# Patient Record
Sex: MALE | Race: WHITE | NOT HISPANIC OR LATINO | URBAN - METROPOLITAN AREA
[De-identification: names, ages, dates, MRNs, and addresses within clinical notes are randomized per-mention and may not be internally consistent; named-entity substitution may affect disease eponyms.]

---

## 2015-10-22 RX ORDER — CHOLECALCIFEROL (VITAMIN D3) 125 MCG
1 CAPSULE ORAL
Qty: 0 | Refills: 0 | COMMUNITY
Start: 2015-10-22

## 2015-10-22 RX ORDER — IPRATROPIUM/ALBUTEROL SULFATE 18-103MCG
3 AEROSOL WITH ADAPTER (GRAM) INHALATION
Qty: 0 | Refills: 0 | COMMUNITY
Start: 2015-10-22

## 2015-10-22 RX ORDER — ASCORBIC ACID 60 MG
1 TABLET,CHEWABLE ORAL
Qty: 0 | Refills: 0 | COMMUNITY
Start: 2015-10-22

## 2015-10-22 RX ORDER — ASCORBIC ACID 60 MG
0.5 TABLET,CHEWABLE ORAL
Qty: 0 | Refills: 0 | COMMUNITY
Start: 2015-10-22

## 2015-10-22 RX ORDER — LEVOTHYROXINE SODIUM 125 MCG
1 TABLET ORAL
Qty: 0 | Refills: 0 | COMMUNITY
Start: 2015-10-22

## 2015-10-22 RX ORDER — PREGABALIN 225 MG/1
1200 CAPSULE ORAL
Qty: 0 | Refills: 0 | COMMUNITY
Start: 2015-10-22

## 2015-10-22 RX ORDER — CHOLECALCIFEROL (VITAMIN D3) 125 MCG
2 CAPSULE ORAL
Qty: 0 | Refills: 0 | COMMUNITY
Start: 2015-10-22

## 2015-10-22 RX ORDER — CHOLECALCIFEROL (VITAMIN D3) 125 MCG
2000 CAPSULE ORAL
Qty: 0 | Refills: 0 | COMMUNITY
Start: 2015-10-22

## 2015-10-22 RX ORDER — TRAZODONE HCL 50 MG
50 TABLET ORAL
Qty: 0 | Refills: 0 | COMMUNITY
Start: 2015-10-22

## 2015-10-22 RX ORDER — TRAZODONE HCL 50 MG
1 TABLET ORAL
Qty: 0 | Refills: 0 | COMMUNITY
Start: 2015-10-22

## 2015-10-22 RX ORDER — CYCLOSPORINE 0.5 MG/ML
1 EMULSION OPHTHALMIC
Qty: 0 | Refills: 0 | COMMUNITY
Start: 2015-10-22

## 2015-10-22 RX ORDER — PREGABALIN 225 MG/1
1 CAPSULE ORAL
Qty: 0 | Refills: 0 | COMMUNITY
Start: 2015-10-22

## 2017-01-03 ENCOUNTER — INPATIENT (INPATIENT)
Facility: HOSPITAL | Age: 82
LOS: 5 days | Discharge: HOME HEALTH | DRG: 312 | End: 2017-01-09
Attending: INTERNAL MEDICINE | Admitting: INTERNAL MEDICINE
Payer: MEDICARE

## 2017-01-03 VITALS
HEART RATE: 57 BPM | DIASTOLIC BLOOD PRESSURE: 88 MMHG | TEMPERATURE: 98 F | RESPIRATION RATE: 20 BRPM | WEIGHT: 145.95 LBS | SYSTOLIC BLOOD PRESSURE: 156 MMHG | HEIGHT: 68 IN

## 2017-01-03 DIAGNOSIS — E03.9 HYPOTHYROIDISM, UNSPECIFIED: ICD-10-CM
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
## 2017-01-03 DIAGNOSIS — H91.93 UNSPECIFIED HEARING LOSS, BILATERAL: ICD-10-CM

## 2017-01-03 DIAGNOSIS — F03.90 UNSPECIFIED DEMENTIA, UNSPECIFIED SEVERITY, WITHOUT BEHAVIORAL DISTURBANCE, PSYCHOTIC DISTURBANCE, MOOD DISTURBANCE, AND ANXIETY: ICD-10-CM

## 2017-01-03 DIAGNOSIS — R55 SYNCOPE AND COLLAPSE: ICD-10-CM

## 2017-01-03 DIAGNOSIS — M10.9 GOUT, UNSPECIFIED: ICD-10-CM

## 2017-01-03 DIAGNOSIS — Z79.899 OTHER LONG TERM (CURRENT) DRUG THERAPY: ICD-10-CM

## 2017-01-03 DIAGNOSIS — F32.9 MAJOR DEPRESSIVE DISORDER, SINGLE EPISODE, UNSPECIFIED: ICD-10-CM

## 2017-01-03 DIAGNOSIS — I25.10 ATHEROSCLEROTIC HEART DISEASE OF NATIVE CORONARY ARTERY WITHOUT ANGINA PECTORIS: ICD-10-CM

## 2017-01-03 DIAGNOSIS — E78.00 PURE HYPERCHOLESTEROLEMIA, UNSPECIFIED: ICD-10-CM

## 2017-01-03 LAB
ALBUMIN SERPL ELPH-MCNC: 4 G/DL — SIGNIFICANT CHANGE UP (ref 3.3–5)
ALP SERPL-CCNC: 84 U/L — SIGNIFICANT CHANGE UP (ref 40–120)
ALT FLD-CCNC: 12 U/L RC — SIGNIFICANT CHANGE UP (ref 10–45)
ANION GAP SERPL CALC-SCNC: 13 MMOL/L — SIGNIFICANT CHANGE UP (ref 5–17)
APPEARANCE UR: CLEAR — SIGNIFICANT CHANGE UP
AST SERPL-CCNC: 17 U/L — SIGNIFICANT CHANGE UP (ref 10–40)
BASOPHILS # BLD AUTO: 0.1 K/UL — SIGNIFICANT CHANGE UP (ref 0–0.2)
BASOPHILS NFR BLD AUTO: 0.9 % — SIGNIFICANT CHANGE UP (ref 0–2)
BILIRUB SERPL-MCNC: 0.4 MG/DL — SIGNIFICANT CHANGE UP (ref 0.2–1.2)
BILIRUB UR-MCNC: NEGATIVE — SIGNIFICANT CHANGE UP
BUN SERPL-MCNC: 22 MG/DL — SIGNIFICANT CHANGE UP (ref 7–23)
CALCIUM SERPL-MCNC: 9.4 MG/DL — SIGNIFICANT CHANGE UP (ref 8.4–10.5)
CHLORIDE SERPL-SCNC: 103 MMOL/L — SIGNIFICANT CHANGE UP (ref 96–108)
CK MB CFR SERPL CALC: 2.5 NG/ML — SIGNIFICANT CHANGE UP (ref 0–6.7)
CO2 SERPL-SCNC: 22 MMOL/L — SIGNIFICANT CHANGE UP (ref 22–31)
COLOR SPEC: SIGNIFICANT CHANGE UP
CREAT SERPL-MCNC: 1.22 MG/DL — SIGNIFICANT CHANGE UP (ref 0.5–1.3)
DIFF PNL FLD: NEGATIVE — SIGNIFICANT CHANGE UP
EOSINOPHIL # BLD AUTO: 0.3 K/UL — SIGNIFICANT CHANGE UP (ref 0–0.5)
EOSINOPHIL NFR BLD AUTO: 3.4 % — SIGNIFICANT CHANGE UP (ref 0–6)
EPI CELLS # UR: SIGNIFICANT CHANGE UP /HPF
GLUCOSE SERPL-MCNC: 94 MG/DL — SIGNIFICANT CHANGE UP (ref 70–99)
GLUCOSE UR QL: NEGATIVE — SIGNIFICANT CHANGE UP
HCT VFR BLD CALC: 32.2 % — LOW (ref 39–50)
HGB BLD-MCNC: 11.3 G/DL — LOW (ref 13–17)
KETONES UR-MCNC: NEGATIVE — SIGNIFICANT CHANGE UP
LEUKOCYTE ESTERASE UR-ACNC: NEGATIVE — SIGNIFICANT CHANGE UP
LIDOCAIN IGE QN: 18 U/L — SIGNIFICANT CHANGE UP (ref 7–60)
LYMPHOCYTES # BLD AUTO: 2.8 K/UL — SIGNIFICANT CHANGE UP (ref 1–3.3)
LYMPHOCYTES # BLD AUTO: 30.4 % — SIGNIFICANT CHANGE UP (ref 13–44)
MAGNESIUM SERPL-MCNC: 2.2 MG/DL — SIGNIFICANT CHANGE UP (ref 1.6–2.6)
MCHC RBC-ENTMCNC: 32.7 PG — SIGNIFICANT CHANGE UP (ref 27–34)
MCHC RBC-ENTMCNC: 35.2 GM/DL — SIGNIFICANT CHANGE UP (ref 32–36)
MCV RBC AUTO: 93 FL — SIGNIFICANT CHANGE UP (ref 80–100)
MONOCYTES # BLD AUTO: 0.9 K/UL — SIGNIFICANT CHANGE UP (ref 0–0.9)
MONOCYTES NFR BLD AUTO: 10.2 % — SIGNIFICANT CHANGE UP (ref 2–14)
NEUTROPHILS # BLD AUTO: 5 K/UL — SIGNIFICANT CHANGE UP (ref 1.8–7.4)
NEUTROPHILS NFR BLD AUTO: 55 % — SIGNIFICANT CHANGE UP (ref 43–77)
NITRITE UR-MCNC: NEGATIVE — SIGNIFICANT CHANGE UP
PH UR: 6.5 — SIGNIFICANT CHANGE UP (ref 4.8–8)
PLATELET # BLD AUTO: 263 K/UL — SIGNIFICANT CHANGE UP (ref 150–400)
POTASSIUM SERPL-MCNC: 4.1 MMOL/L — SIGNIFICANT CHANGE UP (ref 3.5–5.3)
POTASSIUM SERPL-SCNC: 4.1 MMOL/L — SIGNIFICANT CHANGE UP (ref 3.5–5.3)
PROT SERPL-MCNC: 6.8 G/DL — SIGNIFICANT CHANGE UP (ref 6–8.3)
PROT UR-MCNC: NEGATIVE — SIGNIFICANT CHANGE UP
RBC # BLD: 3.46 M/UL — LOW (ref 4.2–5.8)
RBC # FLD: 12.8 % — SIGNIFICANT CHANGE UP (ref 10.3–14.5)
SODIUM SERPL-SCNC: 138 MMOL/L — SIGNIFICANT CHANGE UP (ref 135–145)
SP GR SPEC: 1.01 — LOW (ref 1.01–1.02)
TROPONIN T SERPL-MCNC: <0.01 NG/ML — SIGNIFICANT CHANGE UP (ref 0–0.06)
TROPONIN T SERPL-MCNC: <0.01 NG/ML — SIGNIFICANT CHANGE UP (ref 0–0.06)
TSH SERPL-MCNC: 2.55 UIU/ML — SIGNIFICANT CHANGE UP (ref 0.27–4.2)
UROBILINOGEN FLD QL: NEGATIVE — SIGNIFICANT CHANGE UP
WBC # BLD: 9.2 K/UL — SIGNIFICANT CHANGE UP (ref 3.8–10.5)
WBC # FLD AUTO: 9.2 K/UL — SIGNIFICANT CHANGE UP (ref 3.8–10.5)

## 2017-01-03 PROCEDURE — 72125 CT NECK SPINE W/O DYE: CPT | Mod: 26

## 2017-01-03 PROCEDURE — 99285 EMERGENCY DEPT VISIT HI MDM: CPT | Mod: GC

## 2017-01-03 PROCEDURE — 72170 X-RAY EXAM OF PELVIS: CPT | Mod: 26

## 2017-01-03 PROCEDURE — 71010: CPT | Mod: 26

## 2017-01-03 PROCEDURE — 70450 CT HEAD/BRAIN W/O DYE: CPT | Mod: 26

## 2017-01-03 RX ORDER — METOCLOPRAMIDE HCL 10 MG
1 TABLET ORAL
Qty: 0 | Refills: 0 | COMMUNITY

## 2017-01-03 RX ORDER — FERROUS SULFATE 325(65) MG
325 TABLET ORAL DAILY
Qty: 0 | Refills: 0 | Status: DISCONTINUED | OUTPATIENT
Start: 2017-01-03 | End: 2017-01-09

## 2017-01-03 RX ORDER — UREA 40 %
1 CREAM (GRAM) TOPICAL
Qty: 0 | Refills: 0 | COMMUNITY

## 2017-01-03 RX ORDER — TRAZODONE HCL 50 MG
25 TABLET ORAL AT BEDTIME
Qty: 0 | Refills: 0 | Status: DISCONTINUED | OUTPATIENT
Start: 2017-01-03 | End: 2017-01-09

## 2017-01-03 RX ORDER — MULTIVIT-MIN/FERROUS GLUCONATE 9 MG/15 ML
1 LIQUID (ML) ORAL
Qty: 0 | Refills: 0 | COMMUNITY

## 2017-01-03 RX ORDER — ENOXAPARIN SODIUM 100 MG/ML
40 INJECTION SUBCUTANEOUS EVERY 24 HOURS
Qty: 0 | Refills: 0 | Status: DISCONTINUED | OUTPATIENT
Start: 2017-01-03 | End: 2017-01-09

## 2017-01-03 RX ORDER — RIVASTIGMINE 4.6 MG/24H
1 PATCH, EXTENDED RELEASE TRANSDERMAL EVERY 24 HOURS
Qty: 0 | Refills: 0 | Status: DISCONTINUED | OUTPATIENT
Start: 2017-01-03 | End: 2017-01-09

## 2017-01-03 RX ORDER — PYRIDOXINE HCL (VITAMIN B6) 100 MG
100 TABLET ORAL DAILY
Qty: 0 | Refills: 0 | Status: DISCONTINUED | OUTPATIENT
Start: 2017-01-03 | End: 2017-01-09

## 2017-01-03 RX ORDER — SIMVASTATIN 20 MG/1
10 TABLET, FILM COATED ORAL AT BEDTIME
Qty: 0 | Refills: 0 | Status: DISCONTINUED | OUTPATIENT
Start: 2017-01-03 | End: 2017-01-09

## 2017-01-03 RX ORDER — PREGABALIN 225 MG/1
1000 CAPSULE ORAL DAILY
Qty: 0 | Refills: 0 | Status: DISCONTINUED | OUTPATIENT
Start: 2017-01-03 | End: 2017-01-09

## 2017-01-03 RX ORDER — LANOLIN ALCOHOL/MO/W.PET/CERES
1 CREAM (GRAM) TOPICAL
Qty: 0 | Refills: 0 | COMMUNITY

## 2017-01-03 RX ORDER — PANTOPRAZOLE SODIUM 20 MG/1
40 TABLET, DELAYED RELEASE ORAL
Qty: 0 | Refills: 0 | Status: DISCONTINUED | OUTPATIENT
Start: 2017-01-03 | End: 2017-01-09

## 2017-01-03 RX ORDER — IPRATROPIUM/ALBUTEROL SULFATE 18-103MCG
3 AEROSOL WITH ADAPTER (GRAM) INHALATION EVERY 12 HOURS
Qty: 0 | Refills: 0 | Status: DISCONTINUED | OUTPATIENT
Start: 2017-01-03 | End: 2017-01-09

## 2017-01-03 RX ORDER — ALLOPURINOL 300 MG
100 TABLET ORAL DAILY
Qty: 0 | Refills: 0 | Status: DISCONTINUED | OUTPATIENT
Start: 2017-01-03 | End: 2017-01-09

## 2017-01-03 RX ORDER — SERTRALINE 25 MG/1
50 TABLET, FILM COATED ORAL DAILY
Qty: 0 | Refills: 0 | Status: DISCONTINUED | OUTPATIENT
Start: 2017-01-03 | End: 2017-01-09

## 2017-01-03 RX ORDER — LEVOTHYROXINE SODIUM 125 MCG
50 TABLET ORAL DAILY
Qty: 0 | Refills: 0 | Status: DISCONTINUED | OUTPATIENT
Start: 2017-01-03 | End: 2017-01-09

## 2017-01-03 RX ORDER — TRAZODONE HCL 50 MG
50 TABLET ORAL AT BEDTIME
Qty: 0 | Refills: 0 | Status: DISCONTINUED | OUTPATIENT
Start: 2017-01-03 | End: 2017-01-03

## 2017-01-03 RX ORDER — DOCUSATE SODIUM 100 MG
100 CAPSULE ORAL THREE TIMES A DAY
Qty: 0 | Refills: 0 | Status: DISCONTINUED | OUTPATIENT
Start: 2017-01-03 | End: 2017-01-09

## 2017-01-03 RX ORDER — SOD,AMMONIUM,POTASSIUM LACTATE
1 CREAM (GRAM) TOPICAL
Qty: 0 | Refills: 0 | Status: DISCONTINUED | OUTPATIENT
Start: 2017-01-03 | End: 2017-01-09

## 2017-01-03 RX ORDER — CHOLECALCIFEROL (VITAMIN D3) 125 MCG
1000 CAPSULE ORAL DAILY
Qty: 0 | Refills: 0 | Status: DISCONTINUED | OUTPATIENT
Start: 2017-01-03 | End: 2017-01-09

## 2017-01-03 RX ORDER — SERTRALINE 25 MG/1
50 TABLET, FILM COATED ORAL ONCE
Qty: 0 | Refills: 0 | Status: COMPLETED | OUTPATIENT
Start: 2017-01-03 | End: 2017-01-03

## 2017-01-03 RX ORDER — ASCORBIC ACID 60 MG
500 TABLET,CHEWABLE ORAL
Qty: 0 | Refills: 0 | Status: DISCONTINUED | OUTPATIENT
Start: 2017-01-03 | End: 2017-01-09

## 2017-01-03 RX ADMIN — PANTOPRAZOLE SODIUM 40 MILLIGRAM(S): 20 TABLET, DELAYED RELEASE ORAL at 16:40

## 2017-01-03 RX ADMIN — SIMVASTATIN 10 MILLIGRAM(S): 20 TABLET, FILM COATED ORAL at 23:32

## 2017-01-03 RX ADMIN — Medication 100 MILLIGRAM(S): at 23:31

## 2017-01-03 RX ADMIN — SERTRALINE 50 MILLIGRAM(S): 25 TABLET, FILM COATED ORAL at 19:27

## 2017-01-03 RX ADMIN — Medication 1 DROP(S): at 21:47

## 2017-01-03 RX ADMIN — Medication 50 MICROGRAM(S): at 23:32

## 2017-01-03 RX ADMIN — SERTRALINE 50 MILLIGRAM(S): 25 TABLET, FILM COATED ORAL at 23:32

## 2017-01-03 RX ADMIN — Medication 3 MILLILITER(S): at 23:34

## 2017-01-03 RX ADMIN — RIVASTIGMINE 1 PATCH: 4.6 PATCH, EXTENDED RELEASE TRANSDERMAL at 17:28

## 2017-01-03 RX ADMIN — Medication 500 MILLIGRAM(S): at 21:47

## 2017-01-03 RX ADMIN — Medication 1 APPLICATION(S): at 21:44

## 2017-01-03 RX ADMIN — ENOXAPARIN SODIUM 40 MILLIGRAM(S): 100 INJECTION SUBCUTANEOUS at 21:49

## 2017-01-03 NOTE — ED PROVIDER NOTE - OBJECTIVE STATEMENT
89M bib son due to altered mental status; sons report they heard pt screaming out pts wifes name - pt baseline is alert, conversive, occasionally forgets things but is never disoriented; pt told son he was getting out of bed around 7am and then heard click on R side of head and fell backward, hit head, lost consciousness for unknown time, then called out for son.  Currently co no pain anywhere, denies headache, no nausea/vomiting/chest pain/difficulty breathing.  Walked to car after fall.  Per son, pt has had low sodium causing falls before and has had slow decline in functional status prior to last admission in October.    PMH CABG x 4, gout  Meds - exelon patch; no asa/plavix/other blood thinners 89M bib son due to altered mental status; sons report they heard pt screaming out pts wifes name - pt baseline is alert, conversive, occasionally forgets things but is never disoriented; pt told son he was getting out of bed around 7am and then heard click on R side of head and fell backward, hit head, lost consciousness for unknown time, then called out for son.  Currently co no pain anywhere, denies headache, no nausea/vomiting/chest pain/difficulty breathing.  Walked to car after fall.  Per son, pt has had low sodium causing falls before and has had slow decline in functional status prior to last admission in October.    PMH CABG x 4, gout  Meds - exelon patch; no asa/plavix/other blood thinners  PMD steven goldberg

## 2017-01-03 NOTE — ED PROVIDER NOTE - CONSTITUTIONAL, MLM
normal... Well appearing, well nourished, awake, alert, oriented to person - self, place - hospital, situation and in no apparent distress.

## 2017-01-03 NOTE — H&P ADULT. - NEUROLOGICAL DETAILS
responds to verbal commands/alert and oriented x 3/sensation intact/normal strength/responds to pain

## 2017-01-03 NOTE — ED ADULT NURSE REASSESSMENT NOTE - NS ED NURSE REASSESS COMMENT FT1
excelon patch placed on right upper back of patient. Last patch was removed in ct scan as per son.
received report from SALLY Howard. VSS. patient resting comfortably in bed and in no acute distress. no pain reported by Patient.
vss. safety and comfort measures maintained. family at bedside.

## 2017-01-03 NOTE — ED PROVIDER NOTE - CARE PLAN
Principal Discharge DX:	Fall Principal Discharge DX:	Fall  Secondary Diagnosis:	Syncope and collapse

## 2017-01-03 NOTE — H&P ADULT. - HISTORY OF PRESENT ILLNESS
89 yo male with HTN ( off meds), CAD s/p CABG ( 20 years ago), Dementia, Depression, Hypothyroidism, hx of GI bleed presents with syncopal episode this AM. As per patient and family, he woke up this morning to go to the bathroom,got up quickly, felt dizziness and a clicking near his right head associated with transient b/l blurry vision for a few seconds then lost of conciousness and fell back into is bed. patient lives with one son whom he called out to and was able to walk towards the car to the ER. patient denies any weakness, numbness or tingling, This has never happened before. patient did not take his am medications but did take his medications night before. Patients son then came to father and he noticed that he was a little incoherent. No slurring of words. No constipation no diarrhea, fevers, chills, no sob. No seizure like activity. No loss of urine or bowel contents. Patient's family does endorse that he has been deteriorating for some time  No decrease appetite or weight loss but patient does walk with a cane and now needs a walker mostly at times, He has been drinking water and keeping hydrated. No recent illnesses. he is compliant with all medications.

## 2017-01-03 NOTE — ED ADULT NURSE NOTE - OBJECTIVE STATEMENT
89 Y/O M via walkin with family presenting with resolved syncope as per pt. Pt states he woke up this morning and wanted to go urinate. He states as he was getting up he fainted and does not remember what happened. Family states after the incident he was not making sense. Pt states as he fainted he felt a click on the temporal right side of the head. Pt denies chest pain, sob,n,v, fever, chills, dizziness, h.a. Pt is aaox2. Disorientated to time. Pt has strong pedal pushes and hand . Lungs clear throughout bilat. S1 and S2 heard. No pitting edema noted. Abd soft, nontender, nondistended. + bs in all 4 quadrants. Pt has an abd hernia. Denies urinary s&s. Skin w/d. Unable to assess pt's skin due to pt in hallway. Safety and comfort measures maintained.

## 2017-01-03 NOTE — ED ADULT NURSE NOTE - CHPI ED SYMPTOMS NEG
no cough/no nervousness/no edema/no diaphoresis/no pulling at ears/no vomiting/no shortness of breath/no nausea/no fever/no loss of consciousness/no decreased eating/drinking/no palpitations/no fatigue/no left arm pain/no chest pain/no numbness/no weakness/no dizziness

## 2017-01-03 NOTE — H&P ADULT. - RS GEN PE MLT RESP DETAILS PC
clear to auscultation bilaterally/breath sounds equal/respirations non-labored/no rales/no wheezes/no rhonchi/normal/good air movement

## 2017-01-03 NOTE — ED PROVIDER NOTE - ATTENDING CONTRIBUTION TO CARE
Dr. Meléndez (Attending Physician)  Pt. with syncopal event while getting out of bed to go to bathroom.  Denies ha, neck pain, back pain, laceration.  No preceding chest pain, palpitations or lightheadedness.  Although now feeling lightheaded. Will check cardiac labs, ct head, neck and reassess.  Likely admission.

## 2017-01-03 NOTE — H&P ADULT. - RADIOLOGY RESULTS AND INTERPRETATION
personally reviewed cth, ctspine,chesrxr,pelvisxr- all within normal limits. no bleed in brain, no evidence of acute infarct.

## 2017-01-03 NOTE — ED ADULT NURSE NOTE - PMH
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

## 2017-01-03 NOTE — ED ADULT TRIAGE NOTE - CHIEF COMPLAINT QUOTE
pt states he was getting out of bed had episode of blacking out pt son says he was speaking not making any sense for a period of time

## 2017-01-03 NOTE — ED PROVIDER NOTE - MEDICAL DECISION MAKING DETAILS
89M with closed head injury sp fall - unclear if mechanical or syncopal/related to low sodium?/delirium - will get labs, ct head/cspine, xrays chest/pelvis, reassess. -prelan md

## 2017-01-03 NOTE — H&P ADULT. - PMH
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

## 2017-01-03 NOTE — H&P ADULT. - PROBLEM SELECTOR PLAN 1
- most likely vasovagal and less likely cardiac or neurological- but will trend troponin- first set is negative .01/ ekg shows no st-t changes  - continue with telemetry monitoring  - 2d echo

## 2017-01-03 NOTE — ED PROVIDER NOTE - PMH
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

## 2017-01-03 NOTE — ED ADULT NURSE REASSESSMENT NOTE - CARDIO WDL
Normal rate, regular rhythm, normal S1, S2 heart sounds heard.
Normal rate, regular rhythm, normal S1, S2 heart sounds heard.

## 2017-01-04 LAB
ALBUMIN SERPL ELPH-MCNC: 3.4 G/DL — SIGNIFICANT CHANGE UP (ref 3.3–5)
ALP SERPL-CCNC: 73 U/L — SIGNIFICANT CHANGE UP (ref 40–120)
ALT FLD-CCNC: 6 U/L — LOW (ref 10–45)
ANION GAP SERPL CALC-SCNC: 11 MMOL/L — SIGNIFICANT CHANGE UP (ref 5–17)
AST SERPL-CCNC: 21 U/L — SIGNIFICANT CHANGE UP (ref 10–40)
BASOPHILS # BLD AUTO: 0.04 K/UL — SIGNIFICANT CHANGE UP (ref 0–0.2)
BASOPHILS NFR BLD AUTO: 0.4 % — SIGNIFICANT CHANGE UP (ref 0–2)
BILIRUB SERPL-MCNC: 0.3 MG/DL — SIGNIFICANT CHANGE UP (ref 0.2–1.2)
BUN SERPL-MCNC: 21 MG/DL — SIGNIFICANT CHANGE UP (ref 7–23)
CALCIUM SERPL-MCNC: 9.1 MG/DL — SIGNIFICANT CHANGE UP (ref 8.4–10.5)
CHLORIDE SERPL-SCNC: 106 MMOL/L — SIGNIFICANT CHANGE UP (ref 96–108)
CO2 SERPL-SCNC: 22 MMOL/L — SIGNIFICANT CHANGE UP (ref 22–31)
CREAT SERPL-MCNC: 0.93 MG/DL — SIGNIFICANT CHANGE UP (ref 0.5–1.3)
EOSINOPHIL # BLD AUTO: 0.24 K/UL — SIGNIFICANT CHANGE UP (ref 0–0.5)
EOSINOPHIL NFR BLD AUTO: 2.7 % — SIGNIFICANT CHANGE UP (ref 0–6)
GLUCOSE SERPL-MCNC: 95 MG/DL — SIGNIFICANT CHANGE UP (ref 70–99)
HCT VFR BLD CALC: 31.3 % — LOW (ref 39–50)
HGB BLD-MCNC: 10.4 G/DL — LOW (ref 13–17)
IMM GRANULOCYTES NFR BLD AUTO: 0.2 % — SIGNIFICANT CHANGE UP (ref 0–1.5)
LYMPHOCYTES # BLD AUTO: 3.89 K/UL — HIGH (ref 1–3.3)
LYMPHOCYTES # BLD AUTO: 43.5 % — SIGNIFICANT CHANGE UP (ref 13–44)
MAGNESIUM SERPL-MCNC: 2 MG/DL — SIGNIFICANT CHANGE UP (ref 1.6–2.6)
MCHC RBC-ENTMCNC: 29.8 PG — SIGNIFICANT CHANGE UP (ref 27–34)
MCHC RBC-ENTMCNC: 33.2 GM/DL — SIGNIFICANT CHANGE UP (ref 32–36)
MCV RBC AUTO: 89.7 FL — SIGNIFICANT CHANGE UP (ref 80–100)
MONOCYTES # BLD AUTO: 0.81 K/UL — SIGNIFICANT CHANGE UP (ref 0–0.9)
MONOCYTES NFR BLD AUTO: 9.1 % — SIGNIFICANT CHANGE UP (ref 2–14)
NEUTROPHILS # BLD AUTO: 3.95 K/UL — SIGNIFICANT CHANGE UP (ref 1.8–7.4)
NEUTROPHILS NFR BLD AUTO: 44.1 % — SIGNIFICANT CHANGE UP (ref 43–77)
PLATELET # BLD AUTO: 245 K/UL — SIGNIFICANT CHANGE UP (ref 150–400)
POTASSIUM SERPL-MCNC: 4.1 MMOL/L — SIGNIFICANT CHANGE UP (ref 3.5–5.3)
POTASSIUM SERPL-SCNC: 4.1 MMOL/L — SIGNIFICANT CHANGE UP (ref 3.5–5.3)
PROT SERPL-MCNC: 6.2 G/DL — SIGNIFICANT CHANGE UP (ref 6–8.3)
RBC # BLD: 3.49 M/UL — LOW (ref 4.2–5.8)
RBC # FLD: 14 % — SIGNIFICANT CHANGE UP (ref 10.3–14.5)
SODIUM SERPL-SCNC: 139 MMOL/L — SIGNIFICANT CHANGE UP (ref 135–145)
WBC # BLD: 8.95 K/UL — SIGNIFICANT CHANGE UP (ref 3.8–10.5)
WBC # FLD AUTO: 8.95 K/UL — SIGNIFICANT CHANGE UP (ref 3.8–10.5)

## 2017-01-04 RX ADMIN — Medication 100 MILLIGRAM(S): at 14:11

## 2017-01-04 RX ADMIN — Medication 1000 UNIT(S): at 12:14

## 2017-01-04 RX ADMIN — SERTRALINE 50 MILLIGRAM(S): 25 TABLET, FILM COATED ORAL at 12:23

## 2017-01-04 RX ADMIN — Medication 100 MILLIGRAM(S): at 12:15

## 2017-01-04 RX ADMIN — SIMVASTATIN 10 MILLIGRAM(S): 20 TABLET, FILM COATED ORAL at 21:03

## 2017-01-04 RX ADMIN — ENOXAPARIN SODIUM 40 MILLIGRAM(S): 100 INJECTION SUBCUTANEOUS at 21:02

## 2017-01-04 RX ADMIN — Medication 25 MILLIGRAM(S): at 21:02

## 2017-01-04 RX ADMIN — RIVASTIGMINE 1 PATCH: 4.6 PATCH, EXTENDED RELEASE TRANSDERMAL at 18:00

## 2017-01-04 RX ADMIN — Medication 100 MILLIGRAM(S): at 05:53

## 2017-01-04 RX ADMIN — PANTOPRAZOLE SODIUM 40 MILLIGRAM(S): 20 TABLET, DELAYED RELEASE ORAL at 17:44

## 2017-01-04 RX ADMIN — PREGABALIN 1000 MICROGRAM(S): 225 CAPSULE ORAL at 12:14

## 2017-01-04 RX ADMIN — Medication 50 MICROGRAM(S): at 05:52

## 2017-01-04 RX ADMIN — Medication 100 MILLIGRAM(S): at 21:02

## 2017-01-04 RX ADMIN — Medication 3 MILLILITER(S): at 17:45

## 2017-01-04 RX ADMIN — Medication 3 MILLILITER(S): at 05:55

## 2017-01-04 RX ADMIN — RIVASTIGMINE 1 PATCH: 4.6 PATCH, EXTENDED RELEASE TRANSDERMAL at 17:44

## 2017-01-04 RX ADMIN — Medication 1 APPLICATION(S): at 17:44

## 2017-01-04 RX ADMIN — Medication 500 MILLIGRAM(S): at 17:45

## 2017-01-04 RX ADMIN — Medication 325 MILLIGRAM(S): at 12:14

## 2017-01-04 RX ADMIN — Medication 1 APPLICATION(S): at 05:57

## 2017-01-04 RX ADMIN — Medication 500 MILLIGRAM(S): at 05:52

## 2017-01-04 RX ADMIN — Medication 100 MILLIGRAM(S): at 12:14

## 2017-01-04 RX ADMIN — Medication 1 DROP(S): at 05:54

## 2017-01-04 RX ADMIN — Medication 1 DROP(S): at 17:45

## 2017-01-04 RX ADMIN — PANTOPRAZOLE SODIUM 40 MILLIGRAM(S): 20 TABLET, DELAYED RELEASE ORAL at 05:52

## 2017-01-05 LAB
ANION GAP SERPL CALC-SCNC: 14 MMOL/L — SIGNIFICANT CHANGE UP (ref 5–17)
BUN SERPL-MCNC: 25 MG/DL — HIGH (ref 7–23)
CALCIUM SERPL-MCNC: 9 MG/DL — SIGNIFICANT CHANGE UP (ref 8.4–10.5)
CHLORIDE SERPL-SCNC: 105 MMOL/L — SIGNIFICANT CHANGE UP (ref 96–108)
CO2 SERPL-SCNC: 21 MMOL/L — LOW (ref 22–31)
CREAT SERPL-MCNC: 1.12 MG/DL — SIGNIFICANT CHANGE UP (ref 0.5–1.3)
FOLATE SERPL-MCNC: 13.1 NG/ML — SIGNIFICANT CHANGE UP (ref 4.8–24.2)
GLUCOSE SERPL-MCNC: 79 MG/DL — SIGNIFICANT CHANGE UP (ref 70–99)
HCT VFR BLD CALC: 30.2 % — LOW (ref 39–50)
HCT VFR BLD CALC: 31.4 % — LOW (ref 39–50)
HGB BLD-MCNC: 10.5 G/DL — LOW (ref 13–17)
HGB BLD-MCNC: 9.6 G/DL — LOW (ref 13–17)
MAGNESIUM SERPL-MCNC: 2 MG/DL — SIGNIFICANT CHANGE UP (ref 1.6–2.6)
MCHC RBC-ENTMCNC: 29.2 PG — SIGNIFICANT CHANGE UP (ref 27–34)
MCHC RBC-ENTMCNC: 31.7 PG — SIGNIFICANT CHANGE UP (ref 27–34)
MCHC RBC-ENTMCNC: 31.8 GM/DL — LOW (ref 32–36)
MCHC RBC-ENTMCNC: 33.6 GM/DL — SIGNIFICANT CHANGE UP (ref 32–36)
MCV RBC AUTO: 91.8 FL — SIGNIFICANT CHANGE UP (ref 80–100)
MCV RBC AUTO: 94.2 FL — SIGNIFICANT CHANGE UP (ref 80–100)
PLATELET # BLD AUTO: 225 K/UL — SIGNIFICANT CHANGE UP (ref 150–400)
PLATELET # BLD AUTO: 263 K/UL — SIGNIFICANT CHANGE UP (ref 150–400)
POTASSIUM SERPL-MCNC: 4.2 MMOL/L — SIGNIFICANT CHANGE UP (ref 3.5–5.3)
POTASSIUM SERPL-SCNC: 4.2 MMOL/L — SIGNIFICANT CHANGE UP (ref 3.5–5.3)
RBC # BLD: 3.29 M/UL — LOW (ref 4.2–5.8)
RBC # BLD: 3.33 M/UL — LOW (ref 4.2–5.8)
RBC # FLD: 13.1 % — SIGNIFICANT CHANGE UP (ref 10.3–14.5)
RBC # FLD: 14 % — SIGNIFICANT CHANGE UP (ref 10.3–14.5)
SODIUM SERPL-SCNC: 140 MMOL/L — SIGNIFICANT CHANGE UP (ref 135–145)
VIT B12 SERPL-MCNC: 1130 PG/ML — HIGH (ref 243–894)
WBC # BLD: 7.5 K/UL — SIGNIFICANT CHANGE UP (ref 3.8–10.5)
WBC # BLD: 8.1 K/UL — SIGNIFICANT CHANGE UP (ref 3.8–10.5)
WBC # FLD AUTO: 7.5 K/UL — SIGNIFICANT CHANGE UP (ref 3.8–10.5)
WBC # FLD AUTO: 8.1 K/UL — SIGNIFICANT CHANGE UP (ref 3.8–10.5)

## 2017-01-05 PROCEDURE — 99223 1ST HOSP IP/OBS HIGH 75: CPT

## 2017-01-05 PROCEDURE — 93306 TTE W/DOPPLER COMPLETE: CPT | Mod: 26

## 2017-01-05 RX ADMIN — SERTRALINE 50 MILLIGRAM(S): 25 TABLET, FILM COATED ORAL at 11:15

## 2017-01-05 RX ADMIN — Medication 325 MILLIGRAM(S): at 11:15

## 2017-01-05 RX ADMIN — Medication 100 MILLIGRAM(S): at 05:23

## 2017-01-05 RX ADMIN — RIVASTIGMINE 1 PATCH: 4.6 PATCH, EXTENDED RELEASE TRANSDERMAL at 17:13

## 2017-01-05 RX ADMIN — Medication 1 DROP(S): at 05:22

## 2017-01-05 RX ADMIN — PANTOPRAZOLE SODIUM 40 MILLIGRAM(S): 20 TABLET, DELAYED RELEASE ORAL at 07:59

## 2017-01-05 RX ADMIN — Medication 1 APPLICATION(S): at 05:22

## 2017-01-05 RX ADMIN — Medication 3 MILLILITER(S): at 05:22

## 2017-01-05 RX ADMIN — PREGABALIN 1000 MICROGRAM(S): 225 CAPSULE ORAL at 11:15

## 2017-01-05 RX ADMIN — Medication 1 APPLICATION(S): at 17:15

## 2017-01-05 RX ADMIN — Medication 100 MILLIGRAM(S): at 11:15

## 2017-01-05 RX ADMIN — Medication 3 MILLILITER(S): at 17:15

## 2017-01-05 RX ADMIN — Medication 50 MICROGRAM(S): at 05:23

## 2017-01-05 RX ADMIN — Medication 100 MILLIGRAM(S): at 13:05

## 2017-01-05 RX ADMIN — RIVASTIGMINE 1 PATCH: 4.6 PATCH, EXTENDED RELEASE TRANSDERMAL at 17:16

## 2017-01-05 RX ADMIN — PANTOPRAZOLE SODIUM 40 MILLIGRAM(S): 20 TABLET, DELAYED RELEASE ORAL at 17:12

## 2017-01-05 RX ADMIN — ENOXAPARIN SODIUM 40 MILLIGRAM(S): 100 INJECTION SUBCUTANEOUS at 21:10

## 2017-01-05 RX ADMIN — Medication 500 MILLIGRAM(S): at 17:16

## 2017-01-05 RX ADMIN — Medication 500 MILLIGRAM(S): at 07:59

## 2017-01-05 RX ADMIN — Medication 100 MILLIGRAM(S): at 22:31

## 2017-01-05 RX ADMIN — Medication 1000 UNIT(S): at 11:15

## 2017-01-05 RX ADMIN — SIMVASTATIN 10 MILLIGRAM(S): 20 TABLET, FILM COATED ORAL at 22:31

## 2017-01-05 RX ADMIN — Medication 1 DROP(S): at 17:14

## 2017-01-05 NOTE — PHYSICAL THERAPY INITIAL EVALUATION ADULT - PERTINENT HX OF CURRENT PROBLEM, REHAB EVAL
89 yo male admitted from home following syncopal episode. As per patient and family, he woke up this morning to go to the bathroom,got up quickly, felt dizziness and a clicking near his right head associated with transient b/l blurry vision and lost of consciousness and fell back into is bed. CT head, spine: (-) for acute fx. Xray pelvis: (-) for acute fx.

## 2017-01-05 NOTE — PHYSICAL THERAPY INITIAL EVALUATION ADULT - PLANNED THERAPY INTERVENTIONS, PT EVAL
postural re-education/gait training/balance training/strengthening/bed mobility training/transfer training/stretching

## 2017-01-05 NOTE — PROVIDER CONTACT NOTE (OTHER) - BACKGROUND
Pt came in with fall. Pt has a hx of hypothyroidism, GERD, [place], dementia, depression, PNA, HTN, hypercholestermia, CAD, and gout. Son states usually med is given as needed for agitation.

## 2017-01-05 NOTE — PHYSICAL THERAPY INITIAL EVALUATION ADULT - ADDITIONAL COMMENTS
Pt lives with son in pvt home.  Has 6 MONI.  Pt was previously independent with most activities and ADL's.  Used a straight cane and rollator walker for ambulation.

## 2017-01-06 LAB
ANION GAP SERPL CALC-SCNC: 10 MMOL/L — SIGNIFICANT CHANGE UP (ref 5–17)
BUN SERPL-MCNC: 26 MG/DL — HIGH (ref 7–23)
CALCIUM SERPL-MCNC: 9.3 MG/DL — SIGNIFICANT CHANGE UP (ref 8.4–10.5)
CHLORIDE SERPL-SCNC: 103 MMOL/L — SIGNIFICANT CHANGE UP (ref 96–108)
CO2 SERPL-SCNC: 24 MMOL/L — SIGNIFICANT CHANGE UP (ref 22–31)
CREAT SERPL-MCNC: 1.15 MG/DL — SIGNIFICANT CHANGE UP (ref 0.5–1.3)
GLUCOSE SERPL-MCNC: 85 MG/DL — SIGNIFICANT CHANGE UP (ref 70–99)
HCT VFR BLD CALC: 30.3 % — LOW (ref 39–50)
HGB BLD-MCNC: 9.8 G/DL — LOW (ref 13–17)
MCHC RBC-ENTMCNC: 29.8 PG — SIGNIFICANT CHANGE UP (ref 27–34)
MCHC RBC-ENTMCNC: 32.3 GM/DL — SIGNIFICANT CHANGE UP (ref 32–36)
MCV RBC AUTO: 92.1 FL — SIGNIFICANT CHANGE UP (ref 80–100)
PLATELET # BLD AUTO: 267 K/UL — SIGNIFICANT CHANGE UP (ref 150–400)
POTASSIUM SERPL-MCNC: 4.3 MMOL/L — SIGNIFICANT CHANGE UP (ref 3.5–5.3)
POTASSIUM SERPL-SCNC: 4.3 MMOL/L — SIGNIFICANT CHANGE UP (ref 3.5–5.3)
RBC # BLD: 3.29 M/UL — LOW (ref 4.2–5.8)
RBC # FLD: 14 % — SIGNIFICANT CHANGE UP (ref 10.3–14.5)
SODIUM SERPL-SCNC: 137 MMOL/L — SIGNIFICANT CHANGE UP (ref 135–145)
WBC # BLD: 8.65 K/UL — SIGNIFICANT CHANGE UP (ref 3.8–10.5)
WBC # FLD AUTO: 8.65 K/UL — SIGNIFICANT CHANGE UP (ref 3.8–10.5)

## 2017-01-06 PROCEDURE — 70551 MRI BRAIN STEM W/O DYE: CPT | Mod: 26

## 2017-01-06 RX ADMIN — PANTOPRAZOLE SODIUM 40 MILLIGRAM(S): 20 TABLET, DELAYED RELEASE ORAL at 17:24

## 2017-01-06 RX ADMIN — Medication 1000 UNIT(S): at 13:04

## 2017-01-06 RX ADMIN — Medication 1 APPLICATION(S): at 17:25

## 2017-01-06 RX ADMIN — Medication 100 MILLIGRAM(S): at 05:12

## 2017-01-06 RX ADMIN — Medication 3 MILLILITER(S): at 17:24

## 2017-01-06 RX ADMIN — Medication 50 MICROGRAM(S): at 05:12

## 2017-01-06 RX ADMIN — Medication 500 MILLIGRAM(S): at 05:12

## 2017-01-06 RX ADMIN — Medication 100 MILLIGRAM(S): at 21:55

## 2017-01-06 RX ADMIN — PANTOPRAZOLE SODIUM 40 MILLIGRAM(S): 20 TABLET, DELAYED RELEASE ORAL at 05:12

## 2017-01-06 RX ADMIN — PREGABALIN 1000 MICROGRAM(S): 225 CAPSULE ORAL at 13:03

## 2017-01-06 RX ADMIN — Medication 325 MILLIGRAM(S): at 13:04

## 2017-01-06 RX ADMIN — Medication 1 DROP(S): at 17:25

## 2017-01-06 RX ADMIN — Medication 3 MILLILITER(S): at 05:12

## 2017-01-06 RX ADMIN — Medication 1 APPLICATION(S): at 05:12

## 2017-01-06 RX ADMIN — Medication 500 MILLIGRAM(S): at 17:24

## 2017-01-06 RX ADMIN — RIVASTIGMINE 1 PATCH: 4.6 PATCH, EXTENDED RELEASE TRANSDERMAL at 18:50

## 2017-01-06 RX ADMIN — SERTRALINE 50 MILLIGRAM(S): 25 TABLET, FILM COATED ORAL at 13:04

## 2017-01-06 RX ADMIN — ENOXAPARIN SODIUM 40 MILLIGRAM(S): 100 INJECTION SUBCUTANEOUS at 21:55

## 2017-01-06 RX ADMIN — Medication 25 MILLIGRAM(S): at 23:25

## 2017-01-06 RX ADMIN — SIMVASTATIN 10 MILLIGRAM(S): 20 TABLET, FILM COATED ORAL at 21:55

## 2017-01-06 RX ADMIN — RIVASTIGMINE 1 PATCH: 4.6 PATCH, EXTENDED RELEASE TRANSDERMAL at 17:25

## 2017-01-06 RX ADMIN — Medication 100 MILLIGRAM(S): at 13:04

## 2017-01-06 RX ADMIN — Medication 1 DROP(S): at 05:21

## 2017-01-06 RX ADMIN — Medication 100 MILLIGRAM(S): at 13:03

## 2017-01-07 RX ADMIN — Medication 325 MILLIGRAM(S): at 13:42

## 2017-01-07 RX ADMIN — Medication 500 MILLIGRAM(S): at 09:11

## 2017-01-07 RX ADMIN — Medication 500 MILLIGRAM(S): at 18:52

## 2017-01-07 RX ADMIN — Medication 1 DROP(S): at 17:49

## 2017-01-07 RX ADMIN — SERTRALINE 50 MILLIGRAM(S): 25 TABLET, FILM COATED ORAL at 13:30

## 2017-01-07 RX ADMIN — Medication 100 MILLIGRAM(S): at 13:30

## 2017-01-07 RX ADMIN — Medication 100 MILLIGRAM(S): at 21:10

## 2017-01-07 RX ADMIN — Medication 100 MILLIGRAM(S): at 08:08

## 2017-01-07 RX ADMIN — Medication 1 APPLICATION(S): at 08:08

## 2017-01-07 RX ADMIN — Medication 1 APPLICATION(S): at 17:49

## 2017-01-07 RX ADMIN — PANTOPRAZOLE SODIUM 40 MILLIGRAM(S): 20 TABLET, DELAYED RELEASE ORAL at 08:08

## 2017-01-07 RX ADMIN — Medication 3 MILLILITER(S): at 09:13

## 2017-01-07 RX ADMIN — Medication 1 DROP(S): at 08:08

## 2017-01-07 RX ADMIN — SIMVASTATIN 10 MILLIGRAM(S): 20 TABLET, FILM COATED ORAL at 21:10

## 2017-01-07 RX ADMIN — Medication 50 MICROGRAM(S): at 06:28

## 2017-01-07 RX ADMIN — Medication 3 MILLILITER(S): at 17:50

## 2017-01-07 RX ADMIN — Medication 25 MILLIGRAM(S): at 22:22

## 2017-01-07 RX ADMIN — RIVASTIGMINE 1 PATCH: 4.6 PATCH, EXTENDED RELEASE TRANSDERMAL at 18:52

## 2017-01-07 RX ADMIN — Medication 1000 UNIT(S): at 13:30

## 2017-01-07 RX ADMIN — ENOXAPARIN SODIUM 40 MILLIGRAM(S): 100 INJECTION SUBCUTANEOUS at 21:10

## 2017-01-07 RX ADMIN — PREGABALIN 1000 MICROGRAM(S): 225 CAPSULE ORAL at 13:35

## 2017-01-07 RX ADMIN — RIVASTIGMINE 1 PATCH: 4.6 PATCH, EXTENDED RELEASE TRANSDERMAL at 17:49

## 2017-01-07 RX ADMIN — PANTOPRAZOLE SODIUM 40 MILLIGRAM(S): 20 TABLET, DELAYED RELEASE ORAL at 16:01

## 2017-01-07 RX ADMIN — Medication 100 MILLIGRAM(S): at 15:17

## 2017-01-08 LAB
ANION GAP SERPL CALC-SCNC: 11 MMOL/L — SIGNIFICANT CHANGE UP (ref 5–17)
BUN SERPL-MCNC: 33 MG/DL — HIGH (ref 7–23)
CALCIUM SERPL-MCNC: 8.9 MG/DL — SIGNIFICANT CHANGE UP (ref 8.4–10.5)
CHLORIDE SERPL-SCNC: 103 MMOL/L — SIGNIFICANT CHANGE UP (ref 96–108)
CO2 SERPL-SCNC: 22 MMOL/L — SIGNIFICANT CHANGE UP (ref 22–31)
CREAT SERPL-MCNC: 1.12 MG/DL — SIGNIFICANT CHANGE UP (ref 0.5–1.3)
GLUCOSE SERPL-MCNC: 88 MG/DL — SIGNIFICANT CHANGE UP (ref 70–99)
HCT VFR BLD CALC: 28 % — LOW (ref 39–50)
HGB BLD-MCNC: 9 G/DL — LOW (ref 13–17)
MCHC RBC-ENTMCNC: 29.4 PG — SIGNIFICANT CHANGE UP (ref 27–34)
MCHC RBC-ENTMCNC: 32.1 GM/DL — SIGNIFICANT CHANGE UP (ref 32–36)
MCV RBC AUTO: 91.5 FL — SIGNIFICANT CHANGE UP (ref 80–100)
OB PNL STL: NEGATIVE — SIGNIFICANT CHANGE UP
PLATELET # BLD AUTO: 232 K/UL — SIGNIFICANT CHANGE UP (ref 150–400)
POTASSIUM SERPL-MCNC: 4.3 MMOL/L — SIGNIFICANT CHANGE UP (ref 3.5–5.3)
POTASSIUM SERPL-SCNC: 4.3 MMOL/L — SIGNIFICANT CHANGE UP (ref 3.5–5.3)
RBC # BLD: 3.06 M/UL — LOW (ref 4.2–5.8)
RBC # FLD: 14 % — SIGNIFICANT CHANGE UP (ref 10.3–14.5)
SODIUM SERPL-SCNC: 136 MMOL/L — SIGNIFICANT CHANGE UP (ref 135–145)
WBC # BLD: 11.89 K/UL — HIGH (ref 3.8–10.5)
WBC # FLD AUTO: 11.89 K/UL — HIGH (ref 3.8–10.5)

## 2017-01-08 RX ORDER — ASPIRIN/CALCIUM CARB/MAGNESIUM 324 MG
81 TABLET ORAL DAILY
Qty: 0 | Refills: 0 | Status: DISCONTINUED | OUTPATIENT
Start: 2017-01-08 | End: 2017-01-09

## 2017-01-08 RX ADMIN — Medication 100 MILLIGRAM(S): at 09:27

## 2017-01-08 RX ADMIN — RIVASTIGMINE 1 PATCH: 4.6 PATCH, EXTENDED RELEASE TRANSDERMAL at 17:34

## 2017-01-08 RX ADMIN — PANTOPRAZOLE SODIUM 40 MILLIGRAM(S): 20 TABLET, DELAYED RELEASE ORAL at 17:04

## 2017-01-08 RX ADMIN — Medication 100 MILLIGRAM(S): at 22:25

## 2017-01-08 RX ADMIN — ENOXAPARIN SODIUM 40 MILLIGRAM(S): 100 INJECTION SUBCUTANEOUS at 22:23

## 2017-01-08 RX ADMIN — Medication 1 DROP(S): at 09:26

## 2017-01-08 RX ADMIN — Medication 100 MILLIGRAM(S): at 13:45

## 2017-01-08 RX ADMIN — Medication 100 MILLIGRAM(S): at 13:39

## 2017-01-08 RX ADMIN — Medication 25 MILLIGRAM(S): at 22:36

## 2017-01-08 RX ADMIN — Medication 1000 UNIT(S): at 13:42

## 2017-01-08 RX ADMIN — Medication 1 DROP(S): at 18:28

## 2017-01-08 RX ADMIN — Medication 500 MILLIGRAM(S): at 17:34

## 2017-01-08 RX ADMIN — SERTRALINE 50 MILLIGRAM(S): 25 TABLET, FILM COATED ORAL at 13:42

## 2017-01-08 RX ADMIN — Medication 50 MICROGRAM(S): at 06:20

## 2017-01-08 RX ADMIN — Medication 325 MILLIGRAM(S): at 09:28

## 2017-01-08 RX ADMIN — Medication 500 MILLIGRAM(S): at 09:26

## 2017-01-08 RX ADMIN — Medication 1 APPLICATION(S): at 18:28

## 2017-01-08 RX ADMIN — SIMVASTATIN 10 MILLIGRAM(S): 20 TABLET, FILM COATED ORAL at 22:25

## 2017-01-08 RX ADMIN — PREGABALIN 1000 MICROGRAM(S): 225 CAPSULE ORAL at 13:43

## 2017-01-08 RX ADMIN — Medication 3 MILLILITER(S): at 09:25

## 2017-01-08 RX ADMIN — Medication 3 MILLILITER(S): at 18:29

## 2017-01-08 RX ADMIN — Medication 100 MILLIGRAM(S): at 13:44

## 2017-01-08 RX ADMIN — Medication 1 APPLICATION(S): at 09:26

## 2017-01-08 RX ADMIN — PANTOPRAZOLE SODIUM 40 MILLIGRAM(S): 20 TABLET, DELAYED RELEASE ORAL at 08:27

## 2017-01-08 NOTE — PROVIDER CONTACT NOTE (OTHER) - ASSESSMENT
1/8: hgb is 9  1/7 hgb was 9.8  patient's hemoglobin has been trending downward throughout hospitalization

## 2017-01-08 NOTE — PROVIDER CONTACT NOTE (OTHER) - ACTION/TREATMENT ORDERED:
Hold aspirin
Fecal occult stool
NP ordered to not give trazadone if the patient is not agitated and sleeping.

## 2017-01-08 NOTE — PROVIDER CONTACT NOTE (OTHER) - SITUATION
Pt has trazodone 25mg ordered tonight- pt was lethargic- going for MRI head tomorrow for syncopal episode at home. RN not comfortable giving medication.
Patient refused to take aspirin. Patient stated that he took aspirin in past and his cardiologist discontinued it because of his anemia and past medical history of GI bleed.
Patient's hemoglobin has been trending downward

## 2017-01-08 NOTE — PROVIDER CONTACT NOTE (OTHER) - REASON
Pt has trazodone 25mg ordered tonight- pt was lethargic- going for MRI head tomorrow for syncopal episode at home. RN not comfortable giving medication.
Patient refuses to take aspirin
Patients hemoglobin has been trending downward

## 2017-01-09 ENCOUNTER — TRANSCRIPTION ENCOUNTER (OUTPATIENT)
Age: 82
End: 2017-01-09

## 2017-01-09 VITALS
DIASTOLIC BLOOD PRESSURE: 78 MMHG | SYSTOLIC BLOOD PRESSURE: 147 MMHG | OXYGEN SATURATION: 97 % | HEART RATE: 60 BPM | RESPIRATION RATE: 18 BRPM | TEMPERATURE: 98 F

## 2017-01-09 LAB
ANION GAP SERPL CALC-SCNC: 15 MMOL/L — SIGNIFICANT CHANGE UP (ref 5–17)
BASOPHILS # BLD AUTO: 0.1 K/UL — SIGNIFICANT CHANGE UP (ref 0–0.2)
BASOPHILS NFR BLD AUTO: 0.7 % — SIGNIFICANT CHANGE UP (ref 0–2)
BUN SERPL-MCNC: 30 MG/DL — HIGH (ref 7–23)
CALCIUM SERPL-MCNC: 8.9 MG/DL — SIGNIFICANT CHANGE UP (ref 8.4–10.5)
CHLORIDE SERPL-SCNC: 105 MMOL/L — SIGNIFICANT CHANGE UP (ref 96–108)
CO2 SERPL-SCNC: 21 MMOL/L — LOW (ref 22–31)
CREAT SERPL-MCNC: 1.13 MG/DL — SIGNIFICANT CHANGE UP (ref 0.5–1.3)
EOSINOPHIL # BLD AUTO: 0.2 K/UL — SIGNIFICANT CHANGE UP (ref 0–0.5)
EOSINOPHIL NFR BLD AUTO: 2.7 % — SIGNIFICANT CHANGE UP (ref 0–6)
GLUCOSE SERPL-MCNC: 95 MG/DL — SIGNIFICANT CHANGE UP (ref 70–99)
HCT VFR BLD CALC: 27.9 % — LOW (ref 39–50)
HGB BLD-MCNC: 9.4 G/DL — LOW (ref 13–17)
LYMPHOCYTES # BLD AUTO: 2.4 K/UL — SIGNIFICANT CHANGE UP (ref 1–3.3)
LYMPHOCYTES # BLD AUTO: 29 % — SIGNIFICANT CHANGE UP (ref 13–44)
MCHC RBC-ENTMCNC: 31.9 PG — SIGNIFICANT CHANGE UP (ref 27–34)
MCHC RBC-ENTMCNC: 33.7 GM/DL — SIGNIFICANT CHANGE UP (ref 32–36)
MCV RBC AUTO: 94.6 FL — SIGNIFICANT CHANGE UP (ref 80–100)
MONOCYTES # BLD AUTO: 0.9 K/UL — SIGNIFICANT CHANGE UP (ref 0–0.9)
MONOCYTES NFR BLD AUTO: 10.8 % — SIGNIFICANT CHANGE UP (ref 2–14)
NEUTROPHILS # BLD AUTO: 4.7 K/UL — SIGNIFICANT CHANGE UP (ref 1.8–7.4)
NEUTROPHILS NFR BLD AUTO: 56.9 % — SIGNIFICANT CHANGE UP (ref 43–77)
PLATELET # BLD AUTO: 207 K/UL — SIGNIFICANT CHANGE UP (ref 150–400)
POTASSIUM SERPL-MCNC: 4.4 MMOL/L — SIGNIFICANT CHANGE UP (ref 3.5–5.3)
POTASSIUM SERPL-SCNC: 4.4 MMOL/L — SIGNIFICANT CHANGE UP (ref 3.5–5.3)
RBC # BLD: 2.95 M/UL — LOW (ref 4.2–5.8)
RBC # FLD: 13.2 % — SIGNIFICANT CHANGE UP (ref 10.3–14.5)
SODIUM SERPL-SCNC: 141 MMOL/L — SIGNIFICANT CHANGE UP (ref 135–145)
WBC # BLD: 8.3 K/UL — SIGNIFICANT CHANGE UP (ref 3.8–10.5)
WBC # FLD AUTO: 8.3 K/UL — SIGNIFICANT CHANGE UP (ref 3.8–10.5)

## 2017-01-09 PROCEDURE — 94640 AIRWAY INHALATION TREATMENT: CPT

## 2017-01-09 PROCEDURE — 84484 ASSAY OF TROPONIN QUANT: CPT

## 2017-01-09 PROCEDURE — 70450 CT HEAD/BRAIN W/O DYE: CPT

## 2017-01-09 PROCEDURE — 93005 ELECTROCARDIOGRAM TRACING: CPT

## 2017-01-09 PROCEDURE — 80053 COMPREHEN METABOLIC PANEL: CPT

## 2017-01-09 PROCEDURE — 81001 URINALYSIS AUTO W/SCOPE: CPT

## 2017-01-09 PROCEDURE — 82272 OCCULT BLD FECES 1-3 TESTS: CPT

## 2017-01-09 PROCEDURE — 80048 BASIC METABOLIC PNL TOTAL CA: CPT

## 2017-01-09 PROCEDURE — 93306 TTE W/DOPPLER COMPLETE: CPT

## 2017-01-09 PROCEDURE — 70551 MRI BRAIN STEM W/O DYE: CPT

## 2017-01-09 PROCEDURE — 71045 X-RAY EXAM CHEST 1 VIEW: CPT

## 2017-01-09 PROCEDURE — 84443 ASSAY THYROID STIM HORMONE: CPT

## 2017-01-09 PROCEDURE — 97530 THERAPEUTIC ACTIVITIES: CPT

## 2017-01-09 PROCEDURE — 83735 ASSAY OF MAGNESIUM: CPT

## 2017-01-09 PROCEDURE — 82746 ASSAY OF FOLIC ACID SERUM: CPT

## 2017-01-09 PROCEDURE — 82607 VITAMIN B-12: CPT

## 2017-01-09 PROCEDURE — 99285 EMERGENCY DEPT VISIT HI MDM: CPT | Mod: 25

## 2017-01-09 PROCEDURE — 72125 CT NECK SPINE W/O DYE: CPT

## 2017-01-09 PROCEDURE — 72170 X-RAY EXAM OF PELVIS: CPT

## 2017-01-09 PROCEDURE — 97161 PT EVAL LOW COMPLEX 20 MIN: CPT

## 2017-01-09 PROCEDURE — 83690 ASSAY OF LIPASE: CPT

## 2017-01-09 PROCEDURE — 85027 COMPLETE CBC AUTOMATED: CPT

## 2017-01-09 PROCEDURE — 82550 ASSAY OF CK (CPK): CPT

## 2017-01-09 PROCEDURE — 82553 CREATINE MB FRACTION: CPT

## 2017-01-09 PROCEDURE — 97116 GAIT TRAINING THERAPY: CPT

## 2017-01-09 RX ORDER — METOCLOPRAMIDE HCL 10 MG
1 TABLET ORAL
Qty: 0 | Refills: 0 | COMMUNITY

## 2017-01-09 RX ORDER — COLCHICINE 0.6 MG
1 TABLET ORAL
Qty: 0 | Refills: 0 | COMMUNITY

## 2017-01-09 RX ORDER — TRAZODONE HCL 50 MG
0.5 TABLET ORAL
Qty: 15 | Refills: 0 | OUTPATIENT
Start: 2017-01-09 | End: 2017-02-08

## 2017-01-09 RX ADMIN — Medication 100 MILLIGRAM(S): at 14:08

## 2017-01-09 RX ADMIN — PREGABALIN 1000 MICROGRAM(S): 225 CAPSULE ORAL at 11:20

## 2017-01-09 RX ADMIN — Medication 50 MICROGRAM(S): at 05:09

## 2017-01-09 RX ADMIN — Medication 500 MILLIGRAM(S): at 05:09

## 2017-01-09 RX ADMIN — Medication 1 APPLICATION(S): at 05:10

## 2017-01-09 RX ADMIN — Medication 1 DROP(S): at 05:10

## 2017-01-09 RX ADMIN — Medication 1 APPLICATION(S): at 17:33

## 2017-01-09 RX ADMIN — Medication 325 MILLIGRAM(S): at 11:19

## 2017-01-09 RX ADMIN — Medication 3 MILLILITER(S): at 17:33

## 2017-01-09 RX ADMIN — SERTRALINE 50 MILLIGRAM(S): 25 TABLET, FILM COATED ORAL at 11:21

## 2017-01-09 RX ADMIN — Medication 1000 UNIT(S): at 11:19

## 2017-01-09 RX ADMIN — PANTOPRAZOLE SODIUM 40 MILLIGRAM(S): 20 TABLET, DELAYED RELEASE ORAL at 05:10

## 2017-01-09 RX ADMIN — Medication 100 MILLIGRAM(S): at 05:09

## 2017-01-09 RX ADMIN — RIVASTIGMINE 1 PATCH: 4.6 PATCH, EXTENDED RELEASE TRANSDERMAL at 18:01

## 2017-01-09 RX ADMIN — Medication 3 MILLILITER(S): at 05:09

## 2017-01-09 RX ADMIN — Medication 100 MILLIGRAM(S): at 11:18

## 2017-01-09 RX ADMIN — RIVASTIGMINE 1 PATCH: 4.6 PATCH, EXTENDED RELEASE TRANSDERMAL at 17:31

## 2017-01-09 RX ADMIN — Medication 100 MILLIGRAM(S): at 11:21

## 2017-01-09 RX ADMIN — Medication 1 DROP(S): at 17:33

## 2017-01-09 RX ADMIN — Medication 500 MILLIGRAM(S): at 17:33

## 2017-01-09 RX ADMIN — PANTOPRAZOLE SODIUM 40 MILLIGRAM(S): 20 TABLET, DELAYED RELEASE ORAL at 16:13

## 2017-01-09 NOTE — DISCHARGE NOTE ADULT - MEDICATION SUMMARY - MEDICATIONS TO STOP TAKING
I will STOP taking the medications listed below when I get home from the hospital:    Reglan 10 mg oral tablet  -- 1 tab(s) by mouth 4 times a day (before meals and at bedtime), As Needed    Urea Nail Gel 45% topical gel  -- Apply on skin to affected area 2 times a day, As Needed

## 2017-01-09 NOTE — DISCHARGE NOTE ADULT - CARE PLAN
Principal Discharge DX:	Syncope and collapse  Goal:	Remain without falls  Instructions for follow-up, activity and diet:	HOME CARE INSTRUCTIONS  Have someone stay with you until you feel stable.  Do not drive, operate machinery, or play sports until your caregiver says it is okay.  Keep all follow-up appointments as directed by your caregiver.   Lie down right away if you start feeling like you might faint. Breathe deeply and steadily. Wait until all the symptoms have passed.Drink enough fluids to keep your urine clear or pale yellow.  If you are taking blood pressure or heart medicine, get up slowly, taking several minutes to sit and then stand. This can reduce dizziness.  SEEK IMMEDIATE MEDICAL CARE IF:  You have a severe headache.  You have unusual pain in the chest, abdomen, or back.  You are bleeding from the mouth or rectum, or you have black or tarry stool.  You have an irregular or very fast heartbeat.  You have pain with breathing.  You have repeated fainting or seizure-like jerking during an episode.  You faint when sitting or lying down.  You have confusion.  You have difficulty walking.  You have severe weakness.  You have vision problems.  If you fainted, call your local emergency services. Do not drive yourself to the hospital  Secondary Diagnosis:	Dementia  Instructions for follow-up, activity and diet:	Continue your current medications.  You are being discharged to rehab for conditioning and strengthening.

## 2017-01-09 NOTE — DISCHARGE NOTE ADULT - HOSPITAL COURSE
To be completed by attending 89 yo male with HTN ( off meds), CAD s/p CABG ( 20 years ago), Dementia, Depression, Hypothyroidism, hx of GI bleed presents with syncopal episode. As per patient and family, he woke up this morning to go to the bathroom, got up quickly, felt dizziness and a clicking near his right head associated with transient b/l blurry vision for a few seconds then lost of consciousness and fell back into is bed.   Patient admitted for syncopal episode. Monitored on tele.  No tachyarrhythmias or pauses were captured.  TTE revealed mild segmental LV systolic dysfunction with infero/inferolateral wall hypokinesis and EF 46%.  There was mild diastolic dysfunction.  Neuro consult called as per request of family.  MRI brain showed no acuities but there were chronic white matter ischemic changes and atrophy.  Pt seen by PT and he went to [facility] on 1/9/17.  Neuro recommended baby ASA but pt deferred because of hx of GIB.

## 2017-01-09 NOTE — DISCHARGE NOTE ADULT - MEDICATION SUMMARY - MEDICATIONS TO TAKE
I will START or STAY ON the medications listed below when I get home from the hospital:    sertraline 50 mg oral tablet  -- 1 tab(s) by mouth once a day (at bedtime)  -- Indication: For Dementia    allopurinol 100 mg oral tablet  -- 1 tab(s) by mouth once a day  -- Indication: For Gout    simvastatin 10 mg oral tablet  -- 1 tab(s) by mouth once a day (at bedtime)  -- Indication: For Hypercholesteremia    Anoro Ellipta 62.5 mcg-25 mcg inhalation powder  -- 1 puff(s) inhaled once a day  -- Indication: For Copd    albuterol-ipratropium 2.5 mg-0.5 mg/3 mL inhalation solution  -- 3 milliliter(s) inhaled 2 times a day  -- Indication: For Copd    Exelon 13.3 mg/24 hr transdermal film, extended release  -- 1 patch by transdermal patch once a day  -- Indication: For Dementia    ammonium lactate 12% topical lotion  -- Apply on skin to affected area 2 times a day, As Needed  -- Indication: For Derm    Flector Patch 1.3% topical film, extended release  -- Apply on skin to affected area 2 times a day, As Needed  -- Indication: For Derm    Econazole Nitrate 1% topical cream  -- Apply on skin to affected area , As Needed  -- Indication: For Derm    Slow Fe (as elemental iron) 45 mg oral tablet, extended release  -- 1 tab(s) by mouth every other day  -- Patient takes this on the days he doesn't take ocuvite and Geritol.   -- Indication: For Supplement    Geritol oral liquid  --  by mouth every other day  -- takes it the same day has Ocuvite  -- Indication: For Supplement    Colace 100 mg oral capsule  -- 1 cap(s) by mouth 3 times a day  -- Indication: For Constipation    Restasis 0.05% ophthalmic emulsion  -- 1 drop(s) in each eye every 12 hours  -- Indication: For Dry eyes    pantoprazole 40 mg oral delayed release tablet  -- 1 tab(s) by mouth 2 times a day (before meals)  -- Indication: For Dyspepsia    levothyroxine 50 mcg (0.05 mg) oral tablet  -- 1 tab(s) by mouth once a day  -- Indication: For Hypothyroidism    Ocuvite oral tablet  -- 1 tab(s) by mouth every other day  -- Takes it with the Geritol and Swtiches betwen Ocuvite and the Iron tabs  -- Indication: For Supplement    cholecalciferol oral tablet  -- 2000 unit(s) by mouth 2 times a day  -- Indication: For Supplement    Vitamin B6 100 mg oral tablet  -- 1 tab(s) by mouth once a day  -- Indication: For Supplement    ascorbic acid 500 mg oral tablet  -- 0.5 tab(s) by mouth 2 times a day  -- Indication: For Supplement    Vitamin B-12 1000 mcg oral tablet  -- 1 tab(s) by mouth once a day  -- Indication: For Supplement

## 2017-01-09 NOTE — DISCHARGE NOTE ADULT - PATIENT PORTAL LINK FT
“You can access the Follow[facility] Patient Portal, offered by [facility], by registering with the following website: [email or web address]”

## 2017-01-09 NOTE — DISCHARGE NOTE ADULT - ADDITIONAL INSTRUCTIONS
Make appointments to follow up with your out patient physicians.  Bring all discharge paperwork including discharge medication list to your follow up appointments.

## 2017-01-09 NOTE — DISCHARGE NOTE ADULT - MEDICATION SUMMARY - MEDICATIONS TO CHANGE
I will SWITCH the dose or number of times a day I take the medications listed below when I get home from the hospital:    traZODone 50 mg oral tablet  -- 1 tab(s) by mouth once a day (at bedtime)

## 2017-01-09 NOTE — DISCHARGE NOTE ADULT - PLAN OF CARE
Remain without falls HOME CARE INSTRUCTIONS  Have someone stay with you until you feel stable.  Do not drive, operate machinery, or play sports until your caregiver says it is okay.  Keep all follow-up appointments as directed by your caregiver.   Lie down right away if you start feeling like you might faint. Breathe deeply and steadily. Wait until all the symptoms have passed.Drink enough fluids to keep your urine clear or pale yellow.  If you are taking blood pressure or heart medicine, get up slowly, taking several minutes to sit and then stand. This can reduce dizziness.  SEEK IMMEDIATE MEDICAL CARE IF:  You have a severe headache.  You have unusual pain in the chest, abdomen, or back.  You are bleeding from the mouth or rectum, or you have black or tarry stool.  You have an irregular or very fast heartbeat.  You have pain with breathing.  You have repeated fainting or seizure-like jerking during an episode.  You faint when sitting or lying down.  You have confusion.  You have difficulty walking.  You have severe weakness.  You have vision problems.  If you fainted, call your local emergency services. Do not drive yourself to the hospital Continue your current medications.  You are being discharged to rehab for conditioning and strengthening.

## 2017-02-13 ENCOUNTER — INPATIENT (INPATIENT)
Facility: HOSPITAL | Age: 82
LOS: 9 days | Discharge: NURSING, REHAB OR LONG-TERM CARE | DRG: 394 | End: 2017-02-23
Attending: INTERNAL MEDICINE | Admitting: INTERNAL MEDICINE
Payer: MEDICARE

## 2017-02-13 VITALS
SYSTOLIC BLOOD PRESSURE: 181 MMHG | OXYGEN SATURATION: 96 % | HEART RATE: 74 BPM | RESPIRATION RATE: 20 BRPM | DIASTOLIC BLOOD PRESSURE: 115 MMHG

## 2017-02-13 LAB
APTT BLD: 28.5 SEC — SIGNIFICANT CHANGE UP (ref 27.5–37.4)
BASOPHILS # BLD AUTO: 0 K/UL — SIGNIFICANT CHANGE UP (ref 0–0.2)
BASOPHILS NFR BLD AUTO: 0.2 % — SIGNIFICANT CHANGE UP (ref 0–2)
EOSINOPHIL # BLD AUTO: 0 K/UL — SIGNIFICANT CHANGE UP (ref 0–0.5)
EOSINOPHIL NFR BLD AUTO: 0.2 % — SIGNIFICANT CHANGE UP (ref 0–6)
GAS PNL BLDV: SIGNIFICANT CHANGE UP
HCT VFR BLD CALC: 38.5 % — LOW (ref 39–50)
HGB BLD-MCNC: 13 G/DL — SIGNIFICANT CHANGE UP (ref 13–17)
INR BLD: 1.03 RATIO — SIGNIFICANT CHANGE UP (ref 0.88–1.16)
LYMPHOCYTES # BLD AUTO: 11.3 % — LOW (ref 13–44)
LYMPHOCYTES # BLD AUTO: 2.2 K/UL — SIGNIFICANT CHANGE UP (ref 1–3.3)
MCHC RBC-ENTMCNC: 30.5 PG — SIGNIFICANT CHANGE UP (ref 27–34)
MCHC RBC-ENTMCNC: 33.8 GM/DL — SIGNIFICANT CHANGE UP (ref 32–36)
MCV RBC AUTO: 90.5 FL — SIGNIFICANT CHANGE UP (ref 80–100)
MONOCYTES # BLD AUTO: 0.8 K/UL — SIGNIFICANT CHANGE UP (ref 0–0.9)
MONOCYTES NFR BLD AUTO: 4.3 % — SIGNIFICANT CHANGE UP (ref 2–14)
NEUTROPHILS # BLD AUTO: 15.9 K/UL — HIGH (ref 1.8–7.4)
NEUTROPHILS NFR BLD AUTO: 84 % — HIGH (ref 43–77)
PLATELET # BLD AUTO: 442 K/UL — HIGH (ref 150–400)
PROTHROM AB SERPL-ACNC: 11.2 SEC — SIGNIFICANT CHANGE UP (ref 10–13.1)
RBC # BLD: 4.26 M/UL — SIGNIFICANT CHANGE UP (ref 4.2–5.8)
RBC # FLD: 13.2 % — SIGNIFICANT CHANGE UP (ref 10.3–14.5)
WBC # BLD: 19 K/UL — HIGH (ref 3.8–10.5)
WBC # FLD AUTO: 19 K/UL — HIGH (ref 3.8–10.5)

## 2017-02-13 PROCEDURE — 93010 ELECTROCARDIOGRAM REPORT: CPT

## 2017-02-13 PROCEDURE — 99285 EMERGENCY DEPT VISIT HI MDM: CPT | Mod: 25,GC

## 2017-02-13 PROCEDURE — 71010: CPT | Mod: 26

## 2017-02-13 RX ORDER — SODIUM CHLORIDE 9 MG/ML
1000 INJECTION INTRAMUSCULAR; INTRAVENOUS; SUBCUTANEOUS ONCE
Qty: 0 | Refills: 0 | Status: COMPLETED | OUTPATIENT
Start: 2017-02-13 | End: 2017-02-13

## 2017-02-13 RX ORDER — PANTOPRAZOLE SODIUM 20 MG/1
80 TABLET, DELAYED RELEASE ORAL ONCE
Qty: 0 | Refills: 0 | Status: COMPLETED | OUTPATIENT
Start: 2017-02-13 | End: 2017-02-13

## 2017-02-13 RX ORDER — ONDANSETRON 8 MG/1
4 TABLET, FILM COATED ORAL ONCE
Qty: 0 | Refills: 0 | Status: COMPLETED | OUTPATIENT
Start: 2017-02-13 | End: 2017-02-13

## 2017-02-13 RX ORDER — PANTOPRAZOLE SODIUM 20 MG/1
8 TABLET, DELAYED RELEASE ORAL
Qty: 80 | Refills: 0 | Status: DISCONTINUED | OUTPATIENT
Start: 2017-02-13 | End: 2017-02-16

## 2017-02-13 RX ORDER — MORPHINE SULFATE 50 MG/1
2 CAPSULE, EXTENDED RELEASE ORAL ONCE
Qty: 0 | Refills: 0 | Status: DISCONTINUED | OUTPATIENT
Start: 2017-02-13 | End: 2017-02-13

## 2017-02-13 RX ADMIN — SODIUM CHLORIDE 1000 MILLILITER(S): 9 INJECTION INTRAMUSCULAR; INTRAVENOUS; SUBCUTANEOUS at 23:42

## 2017-02-13 RX ADMIN — PANTOPRAZOLE SODIUM 80 MILLIGRAM(S): 20 TABLET, DELAYED RELEASE ORAL at 23:41

## 2017-02-13 RX ADMIN — MORPHINE SULFATE 2 MILLIGRAM(S): 50 CAPSULE, EXTENDED RELEASE ORAL at 23:42

## 2017-02-13 RX ADMIN — ONDANSETRON 4 MILLIGRAM(S): 8 TABLET, FILM COATED ORAL at 23:41

## 2017-02-13 NOTE — ED PROVIDER NOTE - PMH
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

## 2017-02-13 NOTE — ED PROVIDER NOTE - GENITOURINARY, MLM
large indirect inguinal hernia, no skin changes or ttp large right inguinal hernia, no skin changes, not reducible at bedside with Trendelenburg

## 2017-02-13 NOTE — ED PROVIDER NOTE - PROGRESS NOTE DETAILS
Attending MD Correa: CT with SBO in setting of massive inguinal hernia, surgery consulted Attending MD Correa: patient seen and examined by Dr. Holden of general surgery, given patient's advanced age and nature of surgery for bowel obstruction, morbidity potentially high, patient and family contemplating palliative options instead of surgery. Awaiting final decision from family and patient Attending MD Correa: Dr. Holden at bedside attempted manual reduction of inguinal hernia. Dr. Calderon paged at request of patient and patient family as they have a relationship with Dr. Calderon Attending MD Correa: Dr. Holden (attending general surgeon) states that he successfully reduced the patient's hernia. He states the patient could either improve significantly, or could pass if ischemic bowel was reduced. Patient and family aware of this and consented to bedside reduction over open surgery. will admit to medicine with palliative consult Attending MD Correa: Dr. Holden (attending general surgeon) states that he successfully reduced the patient's hernia. He states the patient could either improve significantly, or could decompensate and  if ischemic bowel was reduced. Patient and family aware of this and consented to bedside reduction over open surgery. will admit to medicine with palliative consult Attending MD Correa: patient denies pain, no emesis. Admitted to medicine service

## 2017-02-13 NOTE — ED PROVIDER NOTE - ATTENDING CONTRIBUTION TO CARE
Patient with emesis, severe pain and right inguinal hernia, concern for ichemic bowel, AAA, and SBO. Moderate to severe Pain. persistent 1 day duration. not better with rest. No fever. + coffee ground emesis  large right unreducible inguinal hernia with Trendelenburg, patient in moderate to severe distress, dry mm, nauseated, non-tachycardic, non-tachypneic, cooperative, answering questions and following directions, +1 edema, no rashes, mild conjunctival pallor, trachea midline, soft, mild lower abdominal tenderness to palpation, no rebound/guarding  will get iv, labs, analgesia prn, cta c/a/p to evaluate for dissection and ischemia, will give fluids, and consult surgery if needed off ct findings vs gi.

## 2017-02-13 NOTE — ED PROVIDER NOTE - OBJECTIVE STATEMENT
89M hx CAD s/p CABG not on antiplatelets, HTN, HLD, duodenal ulcers, large inguinal hernia presents with abdominal pain, nausea and vomiting progressing to coffee-ground emesis beginning this afternoon.  1 episode of coffee-gorund emesis 1 hour prior to arrival.  Loose BM last night, no bowel movements today.  Unknown if passing flatus.  No fever/chills, chest pain, sob. 89M hx CAD s/p CABG not on antiplatelets, HTN, HLD, duodenal ulcers, large inguinal hernia presents with abdominal pain, nausea and vomiting progressing to coffee-ground emesis beginning this afternoon.  1 episode of coffee-gorund emesis 1 hour prior to arrival.  Loose BM last night, no bowel movements today.  Unknown if passing flatus.  No fever/chills, chest pain, sob. Patient and family state right inguinal hernia has partially increased in size and is more tender today.

## 2017-02-13 NOTE — ED ADULT NURSE NOTE - PMH
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

## 2017-02-13 NOTE — ED ADULT NURSE NOTE - PERSON CONFIRMING BED ASSIGNMENT
Report given to Holding RN. Patient A&O x 3, aware of bed assignment, vital signs stable. Patient in stable condition.

## 2017-02-13 NOTE — ED ADULT NURSE NOTE - OBJECTIVE STATEMENT
89y male c/o RLQ abdominal pain and n/v. A&Ox3, neuro intact, mox4. Family reports patient c/o abdominal pain and nausea all day. Gave patient reglan and peptobismol. Vomit up food x 1 at 14:00, vomit dark coffee grounds at 22:00. LBM last night, loose. Denies chest pain, sob, fever/chills. 89y male c/o RLQ abdominal pain and n/v. A&Ox3, neuro intact, mox4. Family reports patient c/o abdominal pain and nausea all day. Gave patient reglan and peptobismol. Vomit up food x 1 at 14:00, vomit dark coffee grounds at 22:00. LBM last night, loose. Denies chest pain, sob, fever/chills. Patient presents with very large abdominal hernia. 89y male c/o RLQ abdominal pain and n/v. A&Ox3, neuro intact, mox4. Family reports patient c/o abdominal pain and nausea all day. Family gave patient reglan and peptobismol with no relief. Vomit up food x 1 at 14:00, vomit dark coffee grounds at 22:00. LBM last night, loose. Denies chest pain, sob, fever/chills. Patient presents with very large abdominal hernia. 89y male c/o RLQ abdominal pain and n/v. A&Ox3, neuro intact, mox4. Family reports patient c/o abdominal pain and nausea all day. Family gave patient reglan and peptobismol with no relief. Vomit up food x 1 at 14:00, vomit dark coffee grounds at 22:00. LBM last night, loose. Patient presents with very large abdominal hernia. Denies chest pain, sob, fever/chills.

## 2017-02-13 NOTE — ED ADULT TRIAGE NOTE - CHIEF COMPLAINT QUOTE
pt with abd pain/ vomiting x 1 day with most recent emesis dark brown. unable to check temp due to vomiting in traige

## 2017-02-14 DIAGNOSIS — E03.9 HYPOTHYROIDISM, UNSPECIFIED: ICD-10-CM

## 2017-02-14 DIAGNOSIS — I10 ESSENTIAL (PRIMARY) HYPERTENSION: ICD-10-CM

## 2017-02-14 DIAGNOSIS — R10.84 GENERALIZED ABDOMINAL PAIN: ICD-10-CM

## 2017-02-14 DIAGNOSIS — K56.60 UNSPECIFIED INTESTINAL OBSTRUCTION: ICD-10-CM

## 2017-02-14 DIAGNOSIS — K56.69 OTHER INTESTINAL OBSTRUCTION: ICD-10-CM

## 2017-02-14 LAB
ALBUMIN SERPL ELPH-MCNC: 4.4 G/DL — SIGNIFICANT CHANGE UP (ref 3.3–5)
ALP SERPL-CCNC: 113 U/L — SIGNIFICANT CHANGE UP (ref 40–120)
ALT FLD-CCNC: 14 U/L RC — SIGNIFICANT CHANGE UP (ref 10–45)
ANION GAP SERPL CALC-SCNC: 19 MMOL/L — HIGH (ref 5–17)
AST SERPL-CCNC: 21 U/L — SIGNIFICANT CHANGE UP (ref 10–40)
BILIRUB SERPL-MCNC: 0.4 MG/DL — SIGNIFICANT CHANGE UP (ref 0.2–1.2)
BUN SERPL-MCNC: 18 MG/DL — SIGNIFICANT CHANGE UP (ref 7–23)
CALCIUM SERPL-MCNC: 10.4 MG/DL — SIGNIFICANT CHANGE UP (ref 8.4–10.5)
CHLORIDE SERPL-SCNC: 97 MMOL/L — SIGNIFICANT CHANGE UP (ref 96–108)
CK MB CFR SERPL CALC: 5.1 NG/ML — SIGNIFICANT CHANGE UP (ref 0–6.7)
CK SERPL-CCNC: 58 U/L — SIGNIFICANT CHANGE UP (ref 30–200)
CO2 SERPL-SCNC: 21 MMOL/L — LOW (ref 22–31)
CREAT SERPL-MCNC: 1.38 MG/DL — HIGH (ref 0.5–1.3)
GLUCOSE SERPL-MCNC: 153 MG/DL — HIGH (ref 70–99)
POTASSIUM SERPL-MCNC: 4.3 MMOL/L — SIGNIFICANT CHANGE UP (ref 3.5–5.3)
POTASSIUM SERPL-SCNC: 4.3 MMOL/L — SIGNIFICANT CHANGE UP (ref 3.5–5.3)
PROT SERPL-MCNC: 8.4 G/DL — HIGH (ref 6–8.3)
SODIUM SERPL-SCNC: 137 MMOL/L — SIGNIFICANT CHANGE UP (ref 135–145)
TROPONIN T SERPL-MCNC: 0.01 NG/ML — SIGNIFICANT CHANGE UP (ref 0–0.06)

## 2017-02-14 PROCEDURE — 74020: CPT | Mod: 26

## 2017-02-14 PROCEDURE — 99223 1ST HOSP IP/OBS HIGH 75: CPT

## 2017-02-14 PROCEDURE — 74174 CTA ABD&PLVS W/CONTRAST: CPT | Mod: 26

## 2017-02-14 RX ORDER — MORPHINE SULFATE 50 MG/1
2 CAPSULE, EXTENDED RELEASE ORAL EVERY 4 HOURS
Qty: 0 | Refills: 0 | Status: DISCONTINUED | OUTPATIENT
Start: 2017-02-14 | End: 2017-02-16

## 2017-02-14 RX ORDER — LEVOTHYROXINE SODIUM 125 MCG
50 TABLET ORAL DAILY
Qty: 0 | Refills: 0 | Status: DISCONTINUED | OUTPATIENT
Start: 2017-02-15 | End: 2017-02-23

## 2017-02-14 RX ORDER — SODIUM CHLORIDE 9 MG/ML
1000 INJECTION, SOLUTION INTRAVENOUS
Qty: 0 | Refills: 0 | Status: DISCONTINUED | OUTPATIENT
Start: 2017-02-14 | End: 2017-02-18

## 2017-02-14 RX ORDER — HEPARIN SODIUM 5000 [USP'U]/ML
5000 INJECTION INTRAVENOUS; SUBCUTANEOUS EVERY 8 HOURS
Qty: 0 | Refills: 0 | Status: DISCONTINUED | OUTPATIENT
Start: 2017-02-14 | End: 2017-02-16

## 2017-02-14 RX ORDER — ONDANSETRON 8 MG/1
4 TABLET, FILM COATED ORAL ONCE
Qty: 0 | Refills: 0 | Status: COMPLETED | OUTPATIENT
Start: 2017-02-14 | End: 2017-02-14

## 2017-02-14 RX ORDER — TIOTROPIUM BROMIDE 18 UG/1
1 CAPSULE ORAL; RESPIRATORY (INHALATION) DAILY
Qty: 0 | Refills: 0 | Status: DISCONTINUED | OUTPATIENT
Start: 2017-02-14 | End: 2017-02-23

## 2017-02-14 RX ORDER — MORPHINE SULFATE 50 MG/1
4 CAPSULE, EXTENDED RELEASE ORAL ONCE
Qty: 0 | Refills: 0 | Status: DISCONTINUED | OUTPATIENT
Start: 2017-02-14 | End: 2017-02-14

## 2017-02-14 RX ORDER — ONDANSETRON 8 MG/1
4 TABLET, FILM COATED ORAL EVERY 6 HOURS
Qty: 0 | Refills: 0 | Status: DISCONTINUED | OUTPATIENT
Start: 2017-02-14 | End: 2017-02-23

## 2017-02-14 RX ORDER — SODIUM CHLORIDE 9 MG/ML
1000 INJECTION INTRAMUSCULAR; INTRAVENOUS; SUBCUTANEOUS ONCE
Qty: 0 | Refills: 0 | Status: COMPLETED | OUTPATIENT
Start: 2017-02-14 | End: 2017-02-14

## 2017-02-14 RX ADMIN — SODIUM CHLORIDE 1000 MILLILITER(S): 9 INJECTION INTRAMUSCULAR; INTRAVENOUS; SUBCUTANEOUS at 01:50

## 2017-02-14 RX ADMIN — SODIUM CHLORIDE 100 MILLILITER(S): 9 INJECTION, SOLUTION INTRAVENOUS at 10:35

## 2017-02-14 RX ADMIN — MORPHINE SULFATE 2 MILLIGRAM(S): 50 CAPSULE, EXTENDED RELEASE ORAL at 05:08

## 2017-02-14 RX ADMIN — MORPHINE SULFATE 4 MILLIGRAM(S): 50 CAPSULE, EXTENDED RELEASE ORAL at 04:27

## 2017-02-14 RX ADMIN — ONDANSETRON 4 MILLIGRAM(S): 8 TABLET, FILM COATED ORAL at 04:27

## 2017-02-14 RX ADMIN — MORPHINE SULFATE 4 MILLIGRAM(S): 50 CAPSULE, EXTENDED RELEASE ORAL at 05:08

## 2017-02-14 RX ADMIN — PANTOPRAZOLE SODIUM 10 MG/HR: 20 TABLET, DELAYED RELEASE ORAL at 10:35

## 2017-02-14 RX ADMIN — HEPARIN SODIUM 5000 UNIT(S): 5000 INJECTION INTRAVENOUS; SUBCUTANEOUS at 22:38

## 2017-02-14 RX ADMIN — SODIUM CHLORIDE 100 MILLILITER(S): 9 INJECTION, SOLUTION INTRAVENOUS at 04:27

## 2017-02-14 RX ADMIN — PANTOPRAZOLE SODIUM 10 MG/HR: 20 TABLET, DELAYED RELEASE ORAL at 00:12

## 2017-02-14 RX ADMIN — PANTOPRAZOLE SODIUM 10 MG/HR: 20 TABLET, DELAYED RELEASE ORAL at 04:27

## 2017-02-14 NOTE — H&P ADULT. - HISTORY OF PRESENT ILLNESS
89 yo gentleman w/PMHx of CAD s/p CABG, GERD, HTN, HLD, hypothyroidism, depression, and dementia presenting with severe abdominal pain and nausea. His son and primary care giver at bedside reports that yesterday morning he had breakfast at 1130 and then developed severe diffuse cramping abdominal pain. He then vomited food contents. The abdominal pain and nausea persisted. He tried pepto bismol without any relief. He had repeat episodes of emesis that became dark brown without "coffee grinds." The intensity of the pain steadily increased over several hours prompting his sons to bring him to the emergency room. He developed diarrhea.  No similar previous episodes. His son reports long standing hx of inguinal hernia and noted some associated increased fullness and "hardness" of the hernia yesterday. No associated fevers/chills,  No CP, SOB, palpitations, diaphoresis, or lightheadedness.     In the ED, initial vitals Afebrile, HR 60 122/77 18 100% on RA     Labs notable for leukocytosis WBC 19.0 Hb 13.0 Plt 442 BMP Cr 1.38 Trop <0.01   Imaging most notable for CXR clear without any free air. XR Abd demonstrated air and stool throughout the colon. CTA Abd demonstrated mesenteric edema and fluid within the hernia sac. No CT evidence of ischemia.     In the ED, he received 2L NS, Zofran 4mg x2, Morphine, and started on pantoprazole gtt.     In the ED, surgery consulted and after extensive discussion with the family decided to perform a bedside manual reduction rather than surgery given mortality risk. Dr. Holden was able to successfully reduce the R. inguinal hernia and the patient's abdominal pain and nausea resolved. On my evaluation, he denies any abd pain, nausea, vomiting, no BMs and has not yet noted passing flatus.     Admitted to medicine for further management and palliative care.

## 2017-02-14 NOTE — H&P ADULT. - PROBLEM SELECTOR PLAN 4
Hold antihypertensives. monitoring closely post reduction as there is risk that reduced bowel could cause peritonitis and significant clinical worsening.   - Monitor BP closely

## 2017-02-14 NOTE — H&P ADULT. - PROBLEM SELECTOR PLAN 2
2/2 incarcerated R. inguinal hernia now s/p reduction with improvement in pain.   - c/w PPI gtt   - Surgery following, appreciate recs  - Will keep NPO for now. Advance diet per surgery and as tolerated   - Pain control and zofran prn   - f/u palliative care consult  - Monitoring BP closely post reduction as there is risk that reduced bowel could cause peritonitis and significant clinical worsening.

## 2017-02-14 NOTE — H&P ADULT. - ASSESSMENT
89M w/CAD s/p CABG, GERD, HTN, HLD, hypothyroidism, depression, and dementia presenting with severe abdominal pain and nausea found to have incarcerated R. inguinal hernia now s/p manual reduction by surgery admitted for further evaluation/management.

## 2017-02-14 NOTE — H&P ADULT. - OTHER
Obtained collateral information from son at bedside.   Case discussed with covering PA regarding assessment and plan of care

## 2017-02-14 NOTE — H&P ADULT. - RADIOLOGY RESULTS AND INTERPRETATION
Personally reviewed imaging. Imaging most notable for CXR clear without any free air. XR Abd demonstrated air and stool throughout the colon. CTA Abd demonstrated mesenteric edema and fluid within the hernia sac. No CT evidence of ischemia.

## 2017-02-14 NOTE — H&P ADULT. - PROBLEM SELECTOR PLAN 1
2/2 incarcerated R. inguinal hernia now s/p reduction with improvement in pain.   - c/w PPI gtt   - Surgery following, appreciate recs  - Will keep NPO for now. Advance diet per surgery and as tolerated   - Pain control  - f/u palliative care consult

## 2017-02-14 NOTE — H&P ADULT. - PMH
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

## 2017-02-14 NOTE — ED ADULT NURSE REASSESSMENT NOTE - NS ED NURSE REASSESS COMMENT FT1
Patient c/o nausea and pain. Patient vomiting dark brown liquid. MD notified. Administer zofran and morphine. VSS. Family at bedside. Will continue to monitor.
Patient dispo decision between surgery vs palliative? Order for 4mg morphine, patient refused at this time.  States he wanted to wait until the pain got worse. Family at bedside.
Report received from SALLY Davis.  Patient A&Ox3 in stretcher resting comfortably.  Patient is admitted awaiting bed.  Possible palliative care, awaiting family decision.  Safety and comfort measures provided.

## 2017-02-15 LAB
ANION GAP SERPL CALC-SCNC: 14 MMOL/L — SIGNIFICANT CHANGE UP (ref 5–17)
APTT BLD: 28.8 SEC — SIGNIFICANT CHANGE UP (ref 27.5–37.4)
BASOPHILS # BLD AUTO: 0.07 K/UL — SIGNIFICANT CHANGE UP (ref 0–0.2)
BASOPHILS NFR BLD AUTO: 0.7 % — SIGNIFICANT CHANGE UP (ref 0–2)
BUN SERPL-MCNC: 18 MG/DL — SIGNIFICANT CHANGE UP (ref 7–23)
CALCIUM SERPL-MCNC: 8.7 MG/DL — SIGNIFICANT CHANGE UP (ref 8.4–10.5)
CHLORIDE SERPL-SCNC: 106 MMOL/L — SIGNIFICANT CHANGE UP (ref 96–108)
CO2 SERPL-SCNC: 20 MMOL/L — LOW (ref 22–31)
CREAT SERPL-MCNC: 1.17 MG/DL — SIGNIFICANT CHANGE UP (ref 0.5–1.3)
EOSINOPHIL # BLD AUTO: 0.18 K/UL — SIGNIFICANT CHANGE UP (ref 0–0.5)
EOSINOPHIL NFR BLD AUTO: 1.9 % — SIGNIFICANT CHANGE UP (ref 0–6)
GLUCOSE SERPL-MCNC: 79 MG/DL — SIGNIFICANT CHANGE UP (ref 70–99)
HCT VFR BLD CALC: 26.6 % — LOW (ref 39–50)
HGB BLD-MCNC: 8.5 G/DL — LOW (ref 13–17)
IMM GRANULOCYTES NFR BLD AUTO: 0.3 % — SIGNIFICANT CHANGE UP (ref 0–1.5)
INR BLD: 1.12 RATIO — SIGNIFICANT CHANGE UP (ref 0.88–1.16)
LACTATE SERPL-SCNC: 1.4 MMOL/L — SIGNIFICANT CHANGE UP (ref 0.7–2)
LYMPHOCYTES # BLD AUTO: 2 K/UL — SIGNIFICANT CHANGE UP (ref 1–3.3)
LYMPHOCYTES # BLD AUTO: 21.4 % — SIGNIFICANT CHANGE UP (ref 13–44)
MAGNESIUM SERPL-MCNC: 2 MG/DL — SIGNIFICANT CHANGE UP (ref 1.6–2.6)
MCHC RBC-ENTMCNC: 29.3 PG — SIGNIFICANT CHANGE UP (ref 27–34)
MCHC RBC-ENTMCNC: 32 GM/DL — SIGNIFICANT CHANGE UP (ref 32–36)
MCV RBC AUTO: 91.7 FL — SIGNIFICANT CHANGE UP (ref 80–100)
MONOCYTES # BLD AUTO: 1.03 K/UL — HIGH (ref 0–0.9)
MONOCYTES NFR BLD AUTO: 11 % — SIGNIFICANT CHANGE UP (ref 2–14)
NEUTROPHILS # BLD AUTO: 6.03 K/UL — SIGNIFICANT CHANGE UP (ref 1.8–7.4)
NEUTROPHILS NFR BLD AUTO: 64.7 % — SIGNIFICANT CHANGE UP (ref 43–77)
PLATELET # BLD AUTO: 342 K/UL — SIGNIFICANT CHANGE UP (ref 150–400)
POTASSIUM SERPL-MCNC: 4.1 MMOL/L — SIGNIFICANT CHANGE UP (ref 3.5–5.3)
POTASSIUM SERPL-SCNC: 4.1 MMOL/L — SIGNIFICANT CHANGE UP (ref 3.5–5.3)
PROTHROM AB SERPL-ACNC: 12.7 SEC — SIGNIFICANT CHANGE UP (ref 10–13.1)
RBC # BLD: 2.9 M/UL — LOW (ref 4.2–5.8)
RBC # FLD: 14.5 % — SIGNIFICANT CHANGE UP (ref 10.3–14.5)
SODIUM SERPL-SCNC: 140 MMOL/L — SIGNIFICANT CHANGE UP (ref 135–145)
WBC # BLD: 9.34 K/UL — SIGNIFICANT CHANGE UP (ref 3.8–10.5)
WBC # FLD AUTO: 9.34 K/UL — SIGNIFICANT CHANGE UP (ref 3.8–10.5)

## 2017-02-15 PROCEDURE — 99231 SBSQ HOSP IP/OBS SF/LOW 25: CPT

## 2017-02-15 PROCEDURE — 99233 SBSQ HOSP IP/OBS HIGH 50: CPT

## 2017-02-15 RX ORDER — SERTRALINE 25 MG/1
50 TABLET, FILM COATED ORAL AT BEDTIME
Qty: 0 | Refills: 0 | Status: DISCONTINUED | OUTPATIENT
Start: 2017-02-15 | End: 2017-02-23

## 2017-02-15 RX ORDER — TRAZODONE HCL 50 MG
50 TABLET ORAL DAILY
Qty: 0 | Refills: 0 | Status: DISCONTINUED | OUTPATIENT
Start: 2017-02-15 | End: 2017-02-16

## 2017-02-15 RX ADMIN — HEPARIN SODIUM 5000 UNIT(S): 5000 INJECTION INTRAVENOUS; SUBCUTANEOUS at 13:36

## 2017-02-15 RX ADMIN — PANTOPRAZOLE SODIUM 10 MG/HR: 20 TABLET, DELAYED RELEASE ORAL at 21:38

## 2017-02-15 RX ADMIN — PANTOPRAZOLE SODIUM 10 MG/HR: 20 TABLET, DELAYED RELEASE ORAL at 16:59

## 2017-02-15 RX ADMIN — Medication 50 MICROGRAM(S): at 05:38

## 2017-02-15 RX ADMIN — HEPARIN SODIUM 5000 UNIT(S): 5000 INJECTION INTRAVENOUS; SUBCUTANEOUS at 05:42

## 2017-02-15 RX ADMIN — SODIUM CHLORIDE 100 MILLILITER(S): 9 INJECTION, SOLUTION INTRAVENOUS at 21:38

## 2017-02-15 RX ADMIN — HEPARIN SODIUM 5000 UNIT(S): 5000 INJECTION INTRAVENOUS; SUBCUTANEOUS at 21:37

## 2017-02-15 RX ADMIN — TIOTROPIUM BROMIDE 1 CAPSULE(S): 18 CAPSULE ORAL; RESPIRATORY (INHALATION) at 12:44

## 2017-02-16 PROCEDURE — 99233 SBSQ HOSP IP/OBS HIGH 50: CPT

## 2017-02-16 RX ORDER — SENNA PLUS 8.6 MG/1
2 TABLET ORAL AT BEDTIME
Qty: 0 | Refills: 0 | Status: DISCONTINUED | OUTPATIENT
Start: 2017-02-16 | End: 2017-02-21

## 2017-02-16 RX ORDER — PANTOPRAZOLE SODIUM 20 MG/1
40 TABLET, DELAYED RELEASE ORAL
Qty: 0 | Refills: 0 | Status: DISCONTINUED | OUTPATIENT
Start: 2017-02-16 | End: 2017-02-23

## 2017-02-16 RX ORDER — SIMVASTATIN 20 MG/1
10 TABLET, FILM COATED ORAL AT BEDTIME
Qty: 0 | Refills: 0 | Status: DISCONTINUED | OUTPATIENT
Start: 2017-02-16 | End: 2017-02-23

## 2017-02-16 RX ORDER — TRAZODONE HCL 50 MG
25 TABLET ORAL AT BEDTIME
Qty: 0 | Refills: 0 | Status: DISCONTINUED | OUTPATIENT
Start: 2017-02-16 | End: 2017-02-23

## 2017-02-16 RX ORDER — ALBUTEROL 90 UG/1
1 AEROSOL, METERED ORAL EVERY 4 HOURS
Qty: 0 | Refills: 0 | Status: DISCONTINUED | OUTPATIENT
Start: 2017-02-16 | End: 2017-02-23

## 2017-02-16 RX ORDER — IPRATROPIUM/ALBUTEROL SULFATE 18-103MCG
3 AEROSOL WITH ADAPTER (GRAM) INHALATION EVERY 6 HOURS
Qty: 0 | Refills: 0 | Status: DISCONTINUED | OUTPATIENT
Start: 2017-02-16 | End: 2017-02-23

## 2017-02-16 RX ORDER — DOCUSATE SODIUM 100 MG
100 CAPSULE ORAL THREE TIMES A DAY
Qty: 0 | Refills: 0 | Status: DISCONTINUED | OUTPATIENT
Start: 2017-02-16 | End: 2017-02-23

## 2017-02-16 RX ORDER — HEPARIN SODIUM 5000 [USP'U]/ML
5000 INJECTION INTRAVENOUS; SUBCUTANEOUS EVERY 12 HOURS
Qty: 0 | Refills: 0 | Status: DISCONTINUED | OUTPATIENT
Start: 2017-02-16 | End: 2017-02-18

## 2017-02-16 RX ORDER — TIOTROPIUM BROMIDE 18 UG/1
1 CAPSULE ORAL; RESPIRATORY (INHALATION) DAILY
Qty: 0 | Refills: 0 | Status: DISCONTINUED | OUTPATIENT
Start: 2017-02-16 | End: 2017-02-23

## 2017-02-16 RX ORDER — BUDESONIDE, MICRONIZED 100 %
0.5 POWDER (GRAM) MISCELLANEOUS
Qty: 0 | Refills: 0 | Status: DISCONTINUED | OUTPATIENT
Start: 2017-02-16 | End: 2017-02-23

## 2017-02-16 RX ADMIN — Medication 0.5 MILLIGRAM(S): at 06:24

## 2017-02-16 RX ADMIN — Medication 3 MILLILITER(S): at 21:07

## 2017-02-16 RX ADMIN — Medication 3 MILLILITER(S): at 17:30

## 2017-02-16 RX ADMIN — Medication 50 MICROGRAM(S): at 05:53

## 2017-02-16 RX ADMIN — SERTRALINE 50 MILLIGRAM(S): 25 TABLET, FILM COATED ORAL at 21:03

## 2017-02-16 RX ADMIN — SIMVASTATIN 10 MILLIGRAM(S): 20 TABLET, FILM COATED ORAL at 21:03

## 2017-02-16 RX ADMIN — Medication 100 MILLIGRAM(S): at 21:03

## 2017-02-16 RX ADMIN — PANTOPRAZOLE SODIUM 10 MG/HR: 20 TABLET, DELAYED RELEASE ORAL at 01:59

## 2017-02-16 RX ADMIN — MORPHINE SULFATE 2 MILLIGRAM(S): 50 CAPSULE, EXTENDED RELEASE ORAL at 01:11

## 2017-02-16 RX ADMIN — TIOTROPIUM BROMIDE 1 CAPSULE(S): 18 CAPSULE ORAL; RESPIRATORY (INHALATION) at 10:58

## 2017-02-16 RX ADMIN — Medication 0.5 MILLIGRAM(S): at 18:42

## 2017-02-16 RX ADMIN — SODIUM CHLORIDE 100 MILLILITER(S): 9 INJECTION, SOLUTION INTRAVENOUS at 17:30

## 2017-02-16 RX ADMIN — PANTOPRAZOLE SODIUM 40 MILLIGRAM(S): 20 TABLET, DELAYED RELEASE ORAL at 17:30

## 2017-02-16 RX ADMIN — MORPHINE SULFATE 2 MILLIGRAM(S): 50 CAPSULE, EXTENDED RELEASE ORAL at 07:15

## 2017-02-16 RX ADMIN — HEPARIN SODIUM 5000 UNIT(S): 5000 INJECTION INTRAVENOUS; SUBCUTANEOUS at 13:02

## 2017-02-16 RX ADMIN — SENNA PLUS 2 TABLET(S): 8.6 TABLET ORAL at 21:03

## 2017-02-16 RX ADMIN — MORPHINE SULFATE 2 MILLIGRAM(S): 50 CAPSULE, EXTENDED RELEASE ORAL at 06:45

## 2017-02-16 RX ADMIN — SODIUM CHLORIDE 100 MILLILITER(S): 9 INJECTION, SOLUTION INTRAVENOUS at 21:06

## 2017-02-16 RX ADMIN — Medication 3 MILLILITER(S): at 10:57

## 2017-02-16 RX ADMIN — MORPHINE SULFATE 2 MILLIGRAM(S): 50 CAPSULE, EXTENDED RELEASE ORAL at 00:56

## 2017-02-16 RX ADMIN — Medication 3 MILLILITER(S): at 06:24

## 2017-02-16 RX ADMIN — SERTRALINE 50 MILLIGRAM(S): 25 TABLET, FILM COATED ORAL at 00:03

## 2017-02-17 LAB
ANION GAP SERPL CALC-SCNC: 10 MMOL/L — SIGNIFICANT CHANGE UP (ref 5–17)
BUN SERPL-MCNC: 20 MG/DL — SIGNIFICANT CHANGE UP (ref 7–23)
CALCIUM SERPL-MCNC: 8.9 MG/DL — SIGNIFICANT CHANGE UP (ref 8.4–10.5)
CHLORIDE SERPL-SCNC: 107 MMOL/L — SIGNIFICANT CHANGE UP (ref 96–108)
CO2 SERPL-SCNC: 25 MMOL/L — SIGNIFICANT CHANGE UP (ref 22–31)
CREAT SERPL-MCNC: 1.23 MG/DL — SIGNIFICANT CHANGE UP (ref 0.5–1.3)
GLUCOSE SERPL-MCNC: 110 MG/DL — HIGH (ref 70–99)
HCT VFR BLD CALC: 27 % — LOW (ref 39–50)
HGB BLD-MCNC: 8.9 G/DL — LOW (ref 13–17)
MCHC RBC-ENTMCNC: 30.4 PG — SIGNIFICANT CHANGE UP (ref 27–34)
MCHC RBC-ENTMCNC: 33.1 GM/DL — SIGNIFICANT CHANGE UP (ref 32–36)
MCV RBC AUTO: 91.8 FL — SIGNIFICANT CHANGE UP (ref 80–100)
PLATELET # BLD AUTO: 288 K/UL — SIGNIFICANT CHANGE UP (ref 150–400)
POTASSIUM SERPL-MCNC: 3.9 MMOL/L — SIGNIFICANT CHANGE UP (ref 3.5–5.3)
POTASSIUM SERPL-SCNC: 3.9 MMOL/L — SIGNIFICANT CHANGE UP (ref 3.5–5.3)
RBC # BLD: 2.94 M/UL — LOW (ref 4.2–5.8)
RBC # FLD: 13.3 % — SIGNIFICANT CHANGE UP (ref 10.3–14.5)
SODIUM SERPL-SCNC: 142 MMOL/L — SIGNIFICANT CHANGE UP (ref 135–145)
WBC # BLD: 10.7 K/UL — HIGH (ref 3.8–10.5)
WBC # FLD AUTO: 10.7 K/UL — HIGH (ref 3.8–10.5)

## 2017-02-17 PROCEDURE — 99233 SBSQ HOSP IP/OBS HIGH 50: CPT

## 2017-02-17 PROCEDURE — 99223 1ST HOSP IP/OBS HIGH 75: CPT

## 2017-02-17 RX ADMIN — Medication 0.5 MILLIGRAM(S): at 05:20

## 2017-02-17 RX ADMIN — TIOTROPIUM BROMIDE 1 CAPSULE(S): 18 CAPSULE ORAL; RESPIRATORY (INHALATION) at 12:55

## 2017-02-17 RX ADMIN — Medication 100 MILLIGRAM(S): at 13:59

## 2017-02-17 RX ADMIN — HEPARIN SODIUM 5000 UNIT(S): 5000 INJECTION INTRAVENOUS; SUBCUTANEOUS at 05:20

## 2017-02-17 RX ADMIN — Medication 0.5 MILLIGRAM(S): at 17:40

## 2017-02-17 RX ADMIN — HEPARIN SODIUM 5000 UNIT(S): 5000 INJECTION INTRAVENOUS; SUBCUTANEOUS at 17:38

## 2017-02-17 RX ADMIN — Medication 25 MILLIGRAM(S): at 21:55

## 2017-02-17 RX ADMIN — Medication 100 MILLIGRAM(S): at 05:20

## 2017-02-17 RX ADMIN — SERTRALINE 50 MILLIGRAM(S): 25 TABLET, FILM COATED ORAL at 21:54

## 2017-02-17 RX ADMIN — SIMVASTATIN 10 MILLIGRAM(S): 20 TABLET, FILM COATED ORAL at 21:54

## 2017-02-17 RX ADMIN — Medication 3 MILLILITER(S): at 12:55

## 2017-02-17 RX ADMIN — SENNA PLUS 2 TABLET(S): 8.6 TABLET ORAL at 21:54

## 2017-02-17 RX ADMIN — PANTOPRAZOLE SODIUM 40 MILLIGRAM(S): 20 TABLET, DELAYED RELEASE ORAL at 05:20

## 2017-02-17 RX ADMIN — Medication 50 MICROGRAM(S): at 05:20

## 2017-02-17 RX ADMIN — Medication 100 MILLIGRAM(S): at 21:54

## 2017-02-17 RX ADMIN — Medication 3 MILLILITER(S): at 05:20

## 2017-02-17 RX ADMIN — Medication 3 MILLILITER(S): at 17:38

## 2017-02-17 RX ADMIN — Medication 3 MILLILITER(S): at 21:57

## 2017-02-17 RX ADMIN — PANTOPRAZOLE SODIUM 40 MILLIGRAM(S): 20 TABLET, DELAYED RELEASE ORAL at 17:38

## 2017-02-17 NOTE — PHYSICAL THERAPY INITIAL EVALUATION ADULT - PRECAUTIONS/LIMITATIONS, REHAB EVAL
Hospital course: 02/14: Surgery foLlowing, currently NPO, Pain management. CT Abd/Pelvis: 02/13: Small bowel obstruction with closed-loop morphology and 2 transition points at the neck of a large right inguinal hernia. Associated mesenteric edema and fluid within the hernia sac. No definite CT evidence of ischemia. CXR: 02/13: No focal consolidation. No free air. Pt doesn't want surgery, need Palliative C/S. 2/15   Seen by Sx, cleared for regular food, DC plan in am, but unable to leave until Friday (medicare requirement), 2/16   Family  Asking for palliative now, called, Dr Luis spoke to Sx to re consider for surgery as per family's req. 2/17 : If surgery decline .patient will require LAWANDA Hospital course: 02/14: Surgery foLlowing, currently NPO, Pain management. CT Abd/Pelvis: 02/13: Small bowel obstruction with closed-loop morphology and 2 transition points at the neck of a large right inguinal hernia. Associated mesenteric edema and fluid within the hernia sac. No definite CT evidence of ischemia. CXR: 02/13: No focal consolidation. No free air. Pt doesn't want surgery, need Palliative C/S. 2/15   Seen by Sx, cleared for regular food, DC plan in am, but unable to leave until Friday (medicare requirement), 2/16   Family  Asking for palliative now, called, Dr Luis spoke to Sx to re consider for surgery as per family's req. 2/17 : If surgery decline .patient will require LAWANDA/fall precautions Hospital course: 02/14: Surgery foLlowing, currently NPO, Pain management. CT Abd/Pelvis: 02/13: Small bowel obstruction with closed-loop morphology and 2 transition points at the neck of a large right inguinal hernia. Associated mesenteric edema and fluid within the hernia sac. No definite CT evidence of ischemia. CXR: 02/13: No focal consolidation. No free air. Pt doesn't want surgery, need Palliative C/S. 2/15   Seen by Sx, cleared for regular food, DC plan in am, but unable to leave until Friday (medicare requirement), 2/16   Family  Asking for palliative now, called, Dr Luis spoke to Sx to re consider for surgery as per family's req. 2/17 : If surgery decline .patient will require LAWANDA. CT angio abd/pelvis: Small bowel obstruction with closed-loop morphology and 2 transition points at the neck of a large right inguinal hernia. Associated mesenteric edema and fluid within the hernia sac. No definite CT evidence of ischemia. Dilated esophagus with a predominantly intrathoracic stomach./fall precautions

## 2017-02-17 NOTE — PHYSICAL THERAPY INITIAL EVALUATION ADULT - ACTIVE RANGE OF MOTION EXAMINATION, REHAB EVAL
Left UE Active ROM was WFL (within functional limits)/Left LE Active ROM was WFL (within functional limits)/Right UE Active ROM was WFL (within functional limits)/Right LE Active ROM was WFL (within functional limits)

## 2017-02-17 NOTE — PHYSICAL THERAPY INITIAL EVALUATION ADULT - PERTINENT HX OF CURRENT PROBLEM, REHAB EVAL
Pt is a 89 yo gentleman admitted to [facility] on 2/14/17 for severe abd pain and nausea. +emesis with coffee grinds. he intensity of the pain steadily increased over several hours prompting his sons to bring him to the emergency room. Pt with R incarcarated hernia s/p manual reduction with improvement in pain.

## 2017-02-17 NOTE — PHYSICAL THERAPY INITIAL EVALUATION ADULT - ADDITIONAL COMMENTS
Per pts son, pt lives with son, +6 steps to enter, amb recently with rolling walker, owns std cane, assist for ADLs, +HHA several days/several hours (not specific)

## 2017-02-18 LAB
ANION GAP SERPL CALC-SCNC: 13 MMOL/L — SIGNIFICANT CHANGE UP (ref 5–17)
BASOPHILS # BLD AUTO: 0.03 K/UL — SIGNIFICANT CHANGE UP (ref 0–0.2)
BASOPHILS NFR BLD AUTO: 0.3 % — SIGNIFICANT CHANGE UP (ref 0–2)
BUN SERPL-MCNC: 15 MG/DL — SIGNIFICANT CHANGE UP (ref 7–23)
CALCIUM SERPL-MCNC: 9 MG/DL — SIGNIFICANT CHANGE UP (ref 8.4–10.5)
CHLORIDE SERPL-SCNC: 102 MMOL/L — SIGNIFICANT CHANGE UP (ref 96–108)
CO2 SERPL-SCNC: 21 MMOL/L — LOW (ref 22–31)
CREAT SERPL-MCNC: 1.16 MG/DL — SIGNIFICANT CHANGE UP (ref 0.5–1.3)
EOSINOPHIL # BLD AUTO: 0.2 K/UL — SIGNIFICANT CHANGE UP (ref 0–0.5)
EOSINOPHIL NFR BLD AUTO: 2.2 % — SIGNIFICANT CHANGE UP (ref 0–6)
GLUCOSE SERPL-MCNC: 96 MG/DL — SIGNIFICANT CHANGE UP (ref 70–99)
GRAM STN FLD: SIGNIFICANT CHANGE UP
HCT VFR BLD CALC: 25.4 % — LOW (ref 39–50)
HGB BLD-MCNC: 8.2 G/DL — LOW (ref 13–17)
IMM GRANULOCYTES NFR BLD AUTO: 0.3 % — SIGNIFICANT CHANGE UP (ref 0–1.5)
LYMPHOCYTES # BLD AUTO: 1.39 K/UL — SIGNIFICANT CHANGE UP (ref 1–3.3)
LYMPHOCYTES # BLD AUTO: 15.5 % — SIGNIFICANT CHANGE UP (ref 13–44)
MCHC RBC-ENTMCNC: 29.2 PG — SIGNIFICANT CHANGE UP (ref 27–34)
MCHC RBC-ENTMCNC: 32.3 GM/DL — SIGNIFICANT CHANGE UP (ref 32–36)
MCV RBC AUTO: 90.4 FL — SIGNIFICANT CHANGE UP (ref 80–100)
MONOCYTES # BLD AUTO: 1.03 K/UL — HIGH (ref 0–0.9)
MONOCYTES NFR BLD AUTO: 11.5 % — SIGNIFICANT CHANGE UP (ref 2–14)
NEUTROPHILS # BLD AUTO: 6.3 K/UL — SIGNIFICANT CHANGE UP (ref 1.8–7.4)
NEUTROPHILS NFR BLD AUTO: 70.2 % — SIGNIFICANT CHANGE UP (ref 43–77)
PLATELET # BLD AUTO: 299 K/UL — SIGNIFICANT CHANGE UP (ref 150–400)
POTASSIUM SERPL-MCNC: 3.9 MMOL/L — SIGNIFICANT CHANGE UP (ref 3.5–5.3)
POTASSIUM SERPL-SCNC: 3.9 MMOL/L — SIGNIFICANT CHANGE UP (ref 3.5–5.3)
RBC # BLD: 2.81 M/UL — LOW (ref 4.2–5.8)
RBC # FLD: 13.9 % — SIGNIFICANT CHANGE UP (ref 10.3–14.5)
SODIUM SERPL-SCNC: 136 MMOL/L — SIGNIFICANT CHANGE UP (ref 135–145)
SPECIMEN SOURCE: SIGNIFICANT CHANGE UP
WBC # BLD: 8.98 K/UL — SIGNIFICANT CHANGE UP (ref 3.8–10.5)
WBC # FLD AUTO: 8.98 K/UL — SIGNIFICANT CHANGE UP (ref 3.8–10.5)

## 2017-02-18 PROCEDURE — 99231 SBSQ HOSP IP/OBS SF/LOW 25: CPT

## 2017-02-18 RX ADMIN — Medication 100 MILLIGRAM(S): at 13:10

## 2017-02-18 RX ADMIN — SENNA PLUS 2 TABLET(S): 8.6 TABLET ORAL at 22:41

## 2017-02-18 RX ADMIN — Medication 100 MILLIGRAM(S): at 22:41

## 2017-02-18 RX ADMIN — SERTRALINE 50 MILLIGRAM(S): 25 TABLET, FILM COATED ORAL at 22:41

## 2017-02-18 RX ADMIN — HEPARIN SODIUM 5000 UNIT(S): 5000 INJECTION INTRAVENOUS; SUBCUTANEOUS at 06:05

## 2017-02-18 RX ADMIN — Medication 50 MICROGRAM(S): at 06:05

## 2017-02-18 RX ADMIN — PANTOPRAZOLE SODIUM 40 MILLIGRAM(S): 20 TABLET, DELAYED RELEASE ORAL at 17:18

## 2017-02-18 RX ADMIN — TIOTROPIUM BROMIDE 1 CAPSULE(S): 18 CAPSULE ORAL; RESPIRATORY (INHALATION) at 12:34

## 2017-02-18 RX ADMIN — Medication 100 MILLIGRAM(S): at 06:05

## 2017-02-18 RX ADMIN — Medication 0.5 MILLIGRAM(S): at 17:20

## 2017-02-18 RX ADMIN — Medication 3 MILLILITER(S): at 12:34

## 2017-02-18 RX ADMIN — Medication 25 MILLIGRAM(S): at 22:42

## 2017-02-18 RX ADMIN — Medication 3 MILLILITER(S): at 23:12

## 2017-02-18 RX ADMIN — Medication 3 MILLILITER(S): at 06:05

## 2017-02-18 RX ADMIN — PANTOPRAZOLE SODIUM 40 MILLIGRAM(S): 20 TABLET, DELAYED RELEASE ORAL at 06:05

## 2017-02-18 RX ADMIN — Medication 3 MILLILITER(S): at 17:20

## 2017-02-18 RX ADMIN — Medication 0.5 MILLIGRAM(S): at 06:05

## 2017-02-18 RX ADMIN — SIMVASTATIN 10 MILLIGRAM(S): 20 TABLET, FILM COATED ORAL at 22:42

## 2017-02-19 LAB
ANION GAP SERPL CALC-SCNC: 14 MMOL/L — SIGNIFICANT CHANGE UP (ref 5–17)
BUN SERPL-MCNC: 17 MG/DL — SIGNIFICANT CHANGE UP (ref 7–23)
CALCIUM SERPL-MCNC: 9.1 MG/DL — SIGNIFICANT CHANGE UP (ref 8.4–10.5)
CHLORIDE SERPL-SCNC: 104 MMOL/L — SIGNIFICANT CHANGE UP (ref 96–108)
CO2 SERPL-SCNC: 20 MMOL/L — LOW (ref 22–31)
CREAT SERPL-MCNC: 1.23 MG/DL — SIGNIFICANT CHANGE UP (ref 0.5–1.3)
GLUCOSE SERPL-MCNC: 95 MG/DL — SIGNIFICANT CHANGE UP (ref 70–99)
HCT VFR BLD CALC: 27.9 % — LOW (ref 39–50)
HGB BLD-MCNC: 9 G/DL — LOW (ref 13–17)
MCHC RBC-ENTMCNC: 29 PG — SIGNIFICANT CHANGE UP (ref 27–34)
MCHC RBC-ENTMCNC: 32.3 GM/DL — SIGNIFICANT CHANGE UP (ref 32–36)
MCV RBC AUTO: 90 FL — SIGNIFICANT CHANGE UP (ref 80–100)
OB PNL STL: NEGATIVE — SIGNIFICANT CHANGE UP
PLATELET # BLD AUTO: 335 K/UL — SIGNIFICANT CHANGE UP (ref 150–400)
POTASSIUM SERPL-MCNC: 4.3 MMOL/L — SIGNIFICANT CHANGE UP (ref 3.5–5.3)
POTASSIUM SERPL-SCNC: 4.3 MMOL/L — SIGNIFICANT CHANGE UP (ref 3.5–5.3)
RBC # BLD: 3.1 M/UL — LOW (ref 4.2–5.8)
RBC # FLD: 14.2 % — SIGNIFICANT CHANGE UP (ref 10.3–14.5)
SODIUM SERPL-SCNC: 138 MMOL/L — SIGNIFICANT CHANGE UP (ref 135–145)
WBC # BLD: 10.67 K/UL — HIGH (ref 3.8–10.5)
WBC # FLD AUTO: 10.67 K/UL — HIGH (ref 3.8–10.5)

## 2017-02-19 RX ADMIN — SERTRALINE 50 MILLIGRAM(S): 25 TABLET, FILM COATED ORAL at 22:10

## 2017-02-19 RX ADMIN — Medication 0.5 MILLIGRAM(S): at 05:26

## 2017-02-19 RX ADMIN — Medication 25 MILLIGRAM(S): at 22:02

## 2017-02-19 RX ADMIN — PANTOPRAZOLE SODIUM 40 MILLIGRAM(S): 20 TABLET, DELAYED RELEASE ORAL at 05:27

## 2017-02-19 RX ADMIN — Medication 100 MILLIGRAM(S): at 09:18

## 2017-02-19 RX ADMIN — Medication 3 MILLILITER(S): at 05:26

## 2017-02-19 RX ADMIN — Medication 50 MICROGRAM(S): at 05:27

## 2017-02-19 RX ADMIN — Medication 3 MILLILITER(S): at 23:39

## 2017-02-19 RX ADMIN — Medication 3 MILLILITER(S): at 13:23

## 2017-02-19 RX ADMIN — TIOTROPIUM BROMIDE 1 CAPSULE(S): 18 CAPSULE ORAL; RESPIRATORY (INHALATION) at 18:25

## 2017-02-19 RX ADMIN — Medication 100 MILLIGRAM(S): at 22:02

## 2017-02-19 RX ADMIN — PANTOPRAZOLE SODIUM 40 MILLIGRAM(S): 20 TABLET, DELAYED RELEASE ORAL at 18:25

## 2017-02-19 RX ADMIN — SIMVASTATIN 10 MILLIGRAM(S): 20 TABLET, FILM COATED ORAL at 22:02

## 2017-02-19 RX ADMIN — Medication 3 MILLILITER(S): at 18:25

## 2017-02-19 RX ADMIN — Medication 0.5 MILLIGRAM(S): at 18:25

## 2017-02-19 RX ADMIN — Medication 100 MILLIGRAM(S): at 05:27

## 2017-02-20 LAB
CULTURE RESULTS: SIGNIFICANT CHANGE UP
SPECIMEN SOURCE: SIGNIFICANT CHANGE UP

## 2017-02-20 PROCEDURE — 99233 SBSQ HOSP IP/OBS HIGH 50: CPT

## 2017-02-20 RX ADMIN — SIMVASTATIN 10 MILLIGRAM(S): 20 TABLET, FILM COATED ORAL at 21:59

## 2017-02-20 RX ADMIN — Medication 50 MICROGRAM(S): at 06:19

## 2017-02-20 RX ADMIN — Medication 3 MILLILITER(S): at 18:25

## 2017-02-20 RX ADMIN — Medication 3 MILLILITER(S): at 12:57

## 2017-02-20 RX ADMIN — TIOTROPIUM BROMIDE 1 CAPSULE(S): 18 CAPSULE ORAL; RESPIRATORY (INHALATION) at 12:57

## 2017-02-20 RX ADMIN — SENNA PLUS 2 TABLET(S): 8.6 TABLET ORAL at 21:59

## 2017-02-20 RX ADMIN — Medication 100 MILLIGRAM(S): at 21:59

## 2017-02-20 RX ADMIN — Medication 25 MILLIGRAM(S): at 22:00

## 2017-02-20 RX ADMIN — Medication 100 MILLIGRAM(S): at 06:19

## 2017-02-20 RX ADMIN — Medication 0.5 MILLIGRAM(S): at 06:19

## 2017-02-20 RX ADMIN — Medication 100 MILLIGRAM(S): at 06:20

## 2017-02-20 RX ADMIN — Medication 100 MILLIGRAM(S): at 12:58

## 2017-02-20 RX ADMIN — PANTOPRAZOLE SODIUM 40 MILLIGRAM(S): 20 TABLET, DELAYED RELEASE ORAL at 17:20

## 2017-02-20 RX ADMIN — Medication 0.5 MILLIGRAM(S): at 18:39

## 2017-02-20 RX ADMIN — SERTRALINE 50 MILLIGRAM(S): 25 TABLET, FILM COATED ORAL at 22:06

## 2017-02-20 RX ADMIN — PANTOPRAZOLE SODIUM 40 MILLIGRAM(S): 20 TABLET, DELAYED RELEASE ORAL at 12:57

## 2017-02-20 RX ADMIN — Medication 3 MILLILITER(S): at 06:19

## 2017-02-21 ENCOUNTER — TRANSCRIPTION ENCOUNTER (OUTPATIENT)
Age: 82
End: 2017-02-21

## 2017-02-21 LAB
ANION GAP SERPL CALC-SCNC: 15 MMOL/L — SIGNIFICANT CHANGE UP (ref 5–17)
BUN SERPL-MCNC: 17 MG/DL — SIGNIFICANT CHANGE UP (ref 7–23)
CALCIUM SERPL-MCNC: 8.9 MG/DL — SIGNIFICANT CHANGE UP (ref 8.4–10.5)
CHLORIDE SERPL-SCNC: 100 MMOL/L — SIGNIFICANT CHANGE UP (ref 96–108)
CO2 SERPL-SCNC: 23 MMOL/L — SIGNIFICANT CHANGE UP (ref 22–31)
CREAT SERPL-MCNC: 1.29 MG/DL — SIGNIFICANT CHANGE UP (ref 0.5–1.3)
GLUCOSE SERPL-MCNC: 103 MG/DL — HIGH (ref 70–99)
HCT VFR BLD CALC: 28.4 % — LOW (ref 39–50)
HGB BLD-MCNC: 9.5 G/DL — LOW (ref 13–17)
MCHC RBC-ENTMCNC: 30.4 PG — SIGNIFICANT CHANGE UP (ref 27–34)
MCHC RBC-ENTMCNC: 33.6 GM/DL — SIGNIFICANT CHANGE UP (ref 32–36)
MCV RBC AUTO: 90.5 FL — SIGNIFICANT CHANGE UP (ref 80–100)
PLATELET # BLD AUTO: 292 K/UL — SIGNIFICANT CHANGE UP (ref 150–400)
POTASSIUM SERPL-MCNC: 3.9 MMOL/L — SIGNIFICANT CHANGE UP (ref 3.5–5.3)
POTASSIUM SERPL-SCNC: 3.9 MMOL/L — SIGNIFICANT CHANGE UP (ref 3.5–5.3)
RBC # BLD: 3.14 M/UL — LOW (ref 4.2–5.8)
RBC # FLD: 13.1 % — SIGNIFICANT CHANGE UP (ref 10.3–14.5)
SODIUM SERPL-SCNC: 138 MMOL/L — SIGNIFICANT CHANGE UP (ref 135–145)
WBC # BLD: 10.3 K/UL — SIGNIFICANT CHANGE UP (ref 3.8–10.5)
WBC # FLD AUTO: 10.3 K/UL — SIGNIFICANT CHANGE UP (ref 3.8–10.5)

## 2017-02-21 PROCEDURE — 99233 SBSQ HOSP IP/OBS HIGH 50: CPT

## 2017-02-21 PROCEDURE — 99497 ADVNCD CARE PLAN 30 MIN: CPT

## 2017-02-21 PROCEDURE — 99232 SBSQ HOSP IP/OBS MODERATE 35: CPT

## 2017-02-21 RX ORDER — SODIUM CHLORIDE 9 MG/ML
250 INJECTION INTRAMUSCULAR; INTRAVENOUS; SUBCUTANEOUS ONCE
Qty: 0 | Refills: 0 | Status: COMPLETED | OUTPATIENT
Start: 2017-02-21 | End: 2017-02-21

## 2017-02-21 RX ORDER — SIMVASTATIN 20 MG/1
1 TABLET, FILM COATED ORAL
Qty: 0 | Refills: 0 | COMMUNITY
Start: 2017-02-21

## 2017-02-21 RX ORDER — SIMVASTATIN 20 MG/1
1 TABLET, FILM COATED ORAL
Qty: 0 | Refills: 0 | COMMUNITY

## 2017-02-21 RX ORDER — DOCUSATE SODIUM 100 MG
1 CAPSULE ORAL
Qty: 0 | Refills: 0 | COMMUNITY

## 2017-02-21 RX ORDER — IPRATROPIUM/ALBUTEROL SULFATE 18-103MCG
3 AEROSOL WITH ADAPTER (GRAM) INHALATION
Qty: 0 | Refills: 0 | COMMUNITY
Start: 2017-02-21

## 2017-02-21 RX ORDER — ALBUTEROL 90 UG/1
1 AEROSOL, METERED ORAL
Qty: 1 | Refills: 0 | OUTPATIENT
Start: 2017-02-21

## 2017-02-21 RX ORDER — PANTOPRAZOLE SODIUM 20 MG/1
1 TABLET, DELAYED RELEASE ORAL
Qty: 0 | Refills: 0 | COMMUNITY
Start: 2017-02-21

## 2017-02-21 RX ORDER — SERTRALINE 25 MG/1
1 TABLET, FILM COATED ORAL
Qty: 0 | Refills: 0 | COMMUNITY
Start: 2017-02-21

## 2017-02-21 RX ORDER — LEVOTHYROXINE SODIUM 125 MCG
1 TABLET ORAL
Qty: 0 | Refills: 0 | COMMUNITY
Start: 2017-02-21

## 2017-02-21 RX ADMIN — Medication 3 MILLILITER(S): at 13:29

## 2017-02-21 RX ADMIN — Medication 3 MILLILITER(S): at 06:12

## 2017-02-21 RX ADMIN — SERTRALINE 50 MILLIGRAM(S): 25 TABLET, FILM COATED ORAL at 21:12

## 2017-02-21 RX ADMIN — Medication 3 MILLILITER(S): at 23:20

## 2017-02-21 RX ADMIN — Medication 100 MILLIGRAM(S): at 06:12

## 2017-02-21 RX ADMIN — PANTOPRAZOLE SODIUM 40 MILLIGRAM(S): 20 TABLET, DELAYED RELEASE ORAL at 06:12

## 2017-02-21 RX ADMIN — PANTOPRAZOLE SODIUM 40 MILLIGRAM(S): 20 TABLET, DELAYED RELEASE ORAL at 18:26

## 2017-02-21 RX ADMIN — SIMVASTATIN 10 MILLIGRAM(S): 20 TABLET, FILM COATED ORAL at 21:11

## 2017-02-21 RX ADMIN — Medication 100 MILLIGRAM(S): at 21:11

## 2017-02-21 RX ADMIN — Medication 0.5 MILLIGRAM(S): at 06:12

## 2017-02-21 RX ADMIN — Medication 0.5 MILLIGRAM(S): at 18:53

## 2017-02-21 RX ADMIN — Medication 3 MILLILITER(S): at 00:19

## 2017-02-21 RX ADMIN — Medication 100 MILLIGRAM(S): at 06:13

## 2017-02-21 RX ADMIN — Medication 3 MILLILITER(S): at 18:26

## 2017-02-21 RX ADMIN — SODIUM CHLORIDE 250 MILLILITER(S): 9 INJECTION INTRAMUSCULAR; INTRAVENOUS; SUBCUTANEOUS at 14:00

## 2017-02-21 RX ADMIN — Medication 50 MICROGRAM(S): at 06:12

## 2017-02-21 NOTE — DISCHARGE NOTE ADULT - PLAN OF CARE
resolved A small bowel obstruction occurs when part or all of the small intestine (bowel) is blocked. As a result, digestive contents can’t move through the bowel properly and out of the body. Treatment is needed right away to remove the blockage. This can ease painful symptoms. It can also prevent serious problems, such as tissue death or bursting (rupture) of the small bowel. Without treatment, a small bowel obstruction can be fatal.  Causes of small bowel obstruction  A small bowel obstruction can be caused by:  Scar tissue (adhesions). These may form after belly (abdominal) surgery or an infection.  Hernia. A hernia is when an organ pushes through a weak spot or tear in the abdomen wall. Part of the small bowel can push out and be seen as a bulge under the belly. Hernias can also occur internally.  Certain health problems. These include when part of the bowel slides inside another part (intussusception). Other causes include irritable bowel disease such as Crohn’s disease, and inflammation and sores in the intestine (ulcerative colitis).  Abnormal tissue growths (tumors). These can form on the inside or outside of the small bowel. They are usually due to cancer.After treatment, most people recover with no lasting effects. If a long part of the bowel is removed, there is a greater chance for lifelong digestive problems. Bowel movements may become irregular. Work with your provider to learn the best ways to manage any symptoms you may have, and to protect your health.Call your provider right away if you have any of the following:  Severe pain (Call 911)  Belly swelling or cramping that won’t go away  Can’t pass stool or gas  Nausea or vomiting (especially if the vomit looks or smells like stool) A hernia is when an organ or tissue pushes through a weak area in the belly (abdominal) wall. This weak area may be present at birth. Or it may be caused by abdominal strain over time. If not treated, a hernia can get worse with time and physical stress.  When a bulge forms  When there is a weak area in the abdominal wall, an organ or tissue can push outward. This often causes a bulge that you can see under your skin. The bulge may get bigger when you stand up. It may go away when you lie down. You may also feel some pressure or mild pain when lifting, coughing, urinating, or doing other activities.  Types of hernias  The type of hernia you have depends on its location. Most hernias form in the groin at or near the internal ring. This is the entrance to a canal between the abdomen and groin. Hernias can also occur in the abdomen, thigh, or genitals. continue home medications The care of individuals with dementia is varied and dependent upon the progression of the dementia.    Use reminders, notes, or directions for daily activities or tasks. Create a calm, quiet environment.  Place large clocks and calendars prominently. Display emergency numbers and home address near all telephones. Have a consistent nighttime routine. Limit napping during the day. Attend social events that stimulate rather than overwhelm the senses. Encourage good nutrition and hydration. Reduce distractions during meal times and snacks. Monitor chewing and swallowing ability. Continue with routine vision, hearing, dental, and medical screenings. Only give over-the-counter or prescription medicines as directed by the caregiver. Monitor driving abilities. Do not allow the person to drive when safe driving is no longer possible.  [place] with an identification program which could provide location assistance in the event of a missing person situation.  SEEK MEDICAL CARE IF:  New behavioral problems start such as moodiness, aggressiveness, or seeing things that are not there (hallucinations).  Any new problem with brain function happens. This includes problems with balance, speech, or falling a lot.  Problems with swallowing develop.  Any symptoms of other illness happen.  Small changes or worsening in any aspect of brain function can be a sign that the illness is getting worse. It can also be a sign of another medical illness such as infection. Seeing a caregiver right away is important.  SEEK IMMEDIATE MEDICAL CARE IF:  A fever develops. New or worsened confusion develops. New or worsened sleepiness develops. Maintain safety. Please complete antibiotic as ordered.   Please complete Prednisone with tapered as indicated.   Continue Inhaler treatments as ordered  Follow up with Dr. Calderon within 1-2 weeks.

## 2017-02-21 NOTE — DISCHARGE NOTE ADULT - CARE PLAN
Principal Discharge DX:	Bowel obstruction  Goal:	resolved  Instructions for follow-up, activity and diet:	A small bowel obstruction occurs when part or all of the small intestine (bowel) is blocked. As a result, digestive contents can’t move through the bowel properly and out of the body. Treatment is needed right away to remove the blockage. This can ease painful symptoms. It can also prevent serious problems, such as tissue death or bursting (rupture) of the small bowel. Without treatment, a small bowel obstruction can be fatal.  Causes of small bowel obstruction  A small bowel obstruction can be caused by:  Scar tissue (adhesions). These may form after belly (abdominal) surgery or an infection.  Hernia. A hernia is when an organ pushes through a weak spot or tear in the abdomen wall. Part of the small bowel can push out and be seen as a bulge under the belly. Hernias can also occur internally.  Certain health problems. These include when part of the bowel slides inside another part (intussusception). Other causes include irritable bowel disease such as Crohn’s disease, and inflammation and sores in the intestine (ulcerative colitis).  Abnormal tissue growths (tumors). These can form on the inside or outside of the small bowel. They are usually due to cancer.After treatment, most people recover with no lasting effects. If a long part of the bowel is removed, there is a greater chance for lifelong digestive problems. Bowel movements may become irregular. Work with your provider to learn the best ways to manage any symptoms you may have, and to protect your health.Call your provider right away if you have any of the following:  Severe pain (Call 911)  Belly swelling or cramping that won’t go away  Can’t pass stool or gas  Nausea or vomiting (especially if the vomit looks or smells like stool)  Secondary Diagnosis:	Incarcerated hernia  Instructions for follow-up, activity and diet:	A hernia is when an organ or tissue pushes through a weak area in the belly (abdominal) wall. This weak area may be present at birth. Or it may be caused by abdominal strain over time. If not treated, a hernia can get worse with time and physical stress.  When a bulge forms  When there is a weak area in the abdominal wall, an organ or tissue can push outward. This often causes a bulge that you can see under your skin. The bulge may get bigger when you stand up. It may go away when you lie down. You may also feel some pressure or mild pain when lifting, coughing, urinating, or doing other activities.  Types of hernias  The type of hernia you have depends on its location. Most hernias form in the groin at or near the internal ring. This is the entrance to a canal between the abdomen and groin. Hernias can also occur in the abdomen, thigh, or genitals.  Secondary Diagnosis:	Dementia  Goal:	continue home medications Principal Discharge DX:	Bowel obstruction  Goal:	resolved  Instructions for follow-up, activity and diet:	A small bowel obstruction occurs when part or all of the small intestine (bowel) is blocked. As a result, digestive contents can’t move through the bowel properly and out of the body. Treatment is needed right away to remove the blockage. This can ease painful symptoms. It can also prevent serious problems, such as tissue death or bursting (rupture) of the small bowel. Without treatment, a small bowel obstruction can be fatal.  Causes of small bowel obstruction  A small bowel obstruction can be caused by:  Scar tissue (adhesions). These may form after belly (abdominal) surgery or an infection.  Hernia. A hernia is when an organ pushes through a weak spot or tear in the abdomen wall. Part of the small bowel can push out and be seen as a bulge under the belly. Hernias can also occur internally.  Certain health problems. These include when part of the bowel slides inside another part (intussusception). Other causes include irritable bowel disease such as Crohn’s disease, and inflammation and sores in the intestine (ulcerative colitis).  Abnormal tissue growths (tumors). These can form on the inside or outside of the small bowel. They are usually due to cancer.After treatment, most people recover with no lasting effects. If a long part of the bowel is removed, there is a greater chance for lifelong digestive problems. Bowel movements may become irregular. Work with your provider to learn the best ways to manage any symptoms you may have, and to protect your health.Call your provider right away if you have any of the following:  Severe pain (Call 911)  Belly swelling or cramping that won’t go away  Can’t pass stool or gas  Nausea or vomiting (especially if the vomit looks or smells like stool)  Secondary Diagnosis:	Incarcerated hernia  Instructions for follow-up, activity and diet:	A hernia is when an organ or tissue pushes through a weak area in the belly (abdominal) wall. This weak area may be present at birth. Or it may be caused by abdominal strain over time. If not treated, a hernia can get worse with time and physical stress.  When a bulge forms  When there is a weak area in the abdominal wall, an organ or tissue can push outward. This often causes a bulge that you can see under your skin. The bulge may get bigger when you stand up. It may go away when you lie down. You may also feel some pressure or mild pain when lifting, coughing, urinating, or doing other activities.  Types of hernias  The type of hernia you have depends on its location. Most hernias form in the groin at or near the internal ring. This is the entrance to a canal between the abdomen and groin. Hernias can also occur in the abdomen, thigh, or genitals.  Secondary Diagnosis:	Dementia  Goal:	continue home medications  Instructions for follow-up, activity and diet:	The care of individuals with dementia is varied and dependent upon the progression of the dementia.    Use reminders, notes, or directions for daily activities or tasks. Create a calm, quiet environment.  Place large clocks and calendars prominently. Display emergency numbers and home address near all telephones. Have a consistent nighttime routine. Limit napping during the day. Attend social events that stimulate rather than overwhelm the senses. Encourage good nutrition and hydration. Reduce distractions during meal times and snacks. Monitor chewing and swallowing ability. Continue with routine vision, hearing, dental, and medical screenings. Only give over-the-counter or prescription medicines as directed by the caregiver. Monitor driving abilities. Do not allow the person to drive when safe driving is no longer possible.  [place] with an identification program which could provide location assistance in the event of a missing person situation.  SEEK MEDICAL CARE IF:  New behavioral problems start such as moodiness, aggressiveness, or seeing things that are not there (hallucinations).  Any new problem with brain function happens. This includes problems with balance, speech, or falling a lot.  Problems with swallowing develop.  Any symptoms of other illness happen.  Small changes or worsening in any aspect of brain function can be a sign that the illness is getting worse. It can also be a sign of another medical illness such as infection. Seeing a caregiver right away is important.  SEEK IMMEDIATE MEDICAL CARE IF:  A fever develops. New or worsened confusion develops. New or worsened sleepiness develops. Maintain safety. Principal Discharge DX:	Bowel obstruction  Goal:	resolved  Instructions for follow-up, activity and diet:	A small bowel obstruction occurs when part or all of the small intestine (bowel) is blocked. As a result, digestive contents can’t move through the bowel properly and out of the body. Treatment is needed right away to remove the blockage. This can ease painful symptoms. It can also prevent serious problems, such as tissue death or bursting (rupture) of the small bowel. Without treatment, a small bowel obstruction can be fatal.  Causes of small bowel obstruction  A small bowel obstruction can be caused by:  Scar tissue (adhesions). These may form after belly (abdominal) surgery or an infection.  Hernia. A hernia is when an organ pushes through a weak spot or tear in the abdomen wall. Part of the small bowel can push out and be seen as a bulge under the belly. Hernias can also occur internally.  Certain health problems. These include when part of the bowel slides inside another part (intussusception). Other causes include irritable bowel disease such as Crohn’s disease, and inflammation and sores in the intestine (ulcerative colitis).  Abnormal tissue growths (tumors). These can form on the inside or outside of the small bowel. They are usually due to cancer.After treatment, most people recover with no lasting effects. If a long part of the bowel is removed, there is a greater chance for lifelong digestive problems. Bowel movements may become irregular. Work with your provider to learn the best ways to manage any symptoms you may have, and to protect your health.Call your provider right away if you have any of the following:  Severe pain (Call 911)  Belly swelling or cramping that won’t go away  Can’t pass stool or gas  Nausea or vomiting (especially if the vomit looks or smells like stool)  Secondary Diagnosis:	Incarcerated hernia  Instructions for follow-up, activity and diet:	A hernia is when an organ or tissue pushes through a weak area in the belly (abdominal) wall. This weak area may be present at birth. Or it may be caused by abdominal strain over time. If not treated, a hernia can get worse with time and physical stress.  When a bulge forms  When there is a weak area in the abdominal wall, an organ or tissue can push outward. This often causes a bulge that you can see under your skin. The bulge may get bigger when you stand up. It may go away when you lie down. You may also feel some pressure or mild pain when lifting, coughing, urinating, or doing other activities.  Types of hernias  The type of hernia you have depends on its location. Most hernias form in the groin at or near the internal ring. This is the entrance to a canal between the abdomen and groin. Hernias can also occur in the abdomen, thigh, or genitals.  Secondary Diagnosis:	Dementia  Goal:	continue home medications  Instructions for follow-up, activity and diet:	The care of individuals with dementia is varied and dependent upon the progression of the dementia.    Use reminders, notes, or directions for daily activities or tasks. Create a calm, quiet environment.  Place large clocks and calendars prominently. Display emergency numbers and home address near all telephones. Have a consistent nighttime routine. Limit napping during the day. Attend social events that stimulate rather than overwhelm the senses. Encourage good nutrition and hydration. Reduce distractions during meal times and snacks. Monitor chewing and swallowing ability. Continue with routine vision, hearing, dental, and medical screenings. Only give over-the-counter or prescription medicines as directed by the caregiver. Monitor driving abilities. Do not allow the person to drive when safe driving is no longer possible.  [place] with an identification program which could provide location assistance in the event of a missing person situation.  SEEK MEDICAL CARE IF:  New behavioral problems start such as moodiness, aggressiveness, or seeing things that are not there (hallucinations).  Any new problem with brain function happens. This includes problems with balance, speech, or falling a lot.  Problems with swallowing develop.  Any symptoms of other illness happen.  Small changes or worsening in any aspect of brain function can be a sign that the illness is getting worse. It can also be a sign of another medical illness such as infection. Seeing a caregiver right away is important.  SEEK IMMEDIATE MEDICAL CARE IF:  A fever develops. New or worsened confusion develops. New or worsened sleepiness develops. Maintain safety. Principal Discharge DX:	Bowel obstruction  Goal:	resolved  Instructions for follow-up, activity and diet:	A small bowel obstruction occurs when part or all of the small intestine (bowel) is blocked. As a result, digestive contents can’t move through the bowel properly and out of the body. Treatment is needed right away to remove the blockage. This can ease painful symptoms. It can also prevent serious problems, such as tissue death or bursting (rupture) of the small bowel. Without treatment, a small bowel obstruction can be fatal.  Causes of small bowel obstruction  A small bowel obstruction can be caused by:  Scar tissue (adhesions). These may form after belly (abdominal) surgery or an infection.  Hernia. A hernia is when an organ pushes through a weak spot or tear in the abdomen wall. Part of the small bowel can push out and be seen as a bulge under the belly. Hernias can also occur internally.  Certain health problems. These include when part of the bowel slides inside another part (intussusception). Other causes include irritable bowel disease such as Crohn’s disease, and inflammation and sores in the intestine (ulcerative colitis).  Abnormal tissue growths (tumors). These can form on the inside or outside of the small bowel. They are usually due to cancer.After treatment, most people recover with no lasting effects. If a long part of the bowel is removed, there is a greater chance for lifelong digestive problems. Bowel movements may become irregular. Work with your provider to learn the best ways to manage any symptoms you may have, and to protect your health.Call your provider right away if you have any of the following:  Severe pain (Call 911)  Belly swelling or cramping that won’t go away  Can’t pass stool or gas  Nausea or vomiting (especially if the vomit looks or smells like stool)  Secondary Diagnosis:	Incarcerated hernia  Instructions for follow-up, activity and diet:	A hernia is when an organ or tissue pushes through a weak area in the belly (abdominal) wall. This weak area may be present at birth. Or it may be caused by abdominal strain over time. If not treated, a hernia can get worse with time and physical stress.  When a bulge forms  When there is a weak area in the abdominal wall, an organ or tissue can push outward. This often causes a bulge that you can see under your skin. The bulge may get bigger when you stand up. It may go away when you lie down. You may also feel some pressure or mild pain when lifting, coughing, urinating, or doing other activities.  Types of hernias  The type of hernia you have depends on its location. Most hernias form in the groin at or near the internal ring. This is the entrance to a canal between the abdomen and groin. Hernias can also occur in the abdomen, thigh, or genitals.  Secondary Diagnosis:	Dementia  Goal:	continue home medications  Instructions for follow-up, activity and diet:	The care of individuals with dementia is varied and dependent upon the progression of the dementia.    Use reminders, notes, or directions for daily activities or tasks. Create a calm, quiet environment.  Place large clocks and calendars prominently. Display emergency numbers and home address near all telephones. Have a consistent nighttime routine. Limit napping during the day. Attend social events that stimulate rather than overwhelm the senses. Encourage good nutrition and hydration. Reduce distractions during meal times and snacks. Monitor chewing and swallowing ability. Continue with routine vision, hearing, dental, and medical screenings. Only give over-the-counter or prescription medicines as directed by the caregiver. Monitor driving abilities. Do not allow the person to drive when safe driving is no longer possible.  [place] with an identification program which could provide location assistance in the event of a missing person situation.  SEEK MEDICAL CARE IF:  New behavioral problems start such as moodiness, aggressiveness, or seeing things that are not there (hallucinations).  Any new problem with brain function happens. This includes problems with balance, speech, or falling a lot.  Problems with swallowing develop.  Any symptoms of other illness happen.  Small changes or worsening in any aspect of brain function can be a sign that the illness is getting worse. It can also be a sign of another medical illness such as infection. Seeing a caregiver right away is important.  SEEK IMMEDIATE MEDICAL CARE IF:  A fever develops. New or worsened confusion develops. New or worsened sleepiness develops. Maintain safety.  Secondary Diagnosis:	Bronchitis  Instructions for follow-up, activity and diet:	Please complete antibiotic as ordered.   Please complete Prednisone with tapered as indicated.   Continue Inhaler treatments as ordered  Follow up with Dr. Calderon within 1-2 weeks. Principal Discharge DX:	Bowel obstruction  Goal:	resolved  Instructions for follow-up, activity and diet:	A small bowel obstruction occurs when part or all of the small intestine (bowel) is blocked. As a result, digestive contents can’t move through the bowel properly and out of the body. Treatment is needed right away to remove the blockage. This can ease painful symptoms. It can also prevent serious problems, such as tissue death or bursting (rupture) of the small bowel. Without treatment, a small bowel obstruction can be fatal.  Causes of small bowel obstruction  A small bowel obstruction can be caused by:  Scar tissue (adhesions). These may form after belly (abdominal) surgery or an infection.  Hernia. A hernia is when an organ pushes through a weak spot or tear in the abdomen wall. Part of the small bowel can push out and be seen as a bulge under the belly. Hernias can also occur internally.  Certain health problems. These include when part of the bowel slides inside another part (intussusception). Other causes include irritable bowel disease such as Crohn’s disease, and inflammation and sores in the intestine (ulcerative colitis).  Abnormal tissue growths (tumors). These can form on the inside or outside of the small bowel. They are usually due to cancer.After treatment, most people recover with no lasting effects. If a long part of the bowel is removed, there is a greater chance for lifelong digestive problems. Bowel movements may become irregular. Work with your provider to learn the best ways to manage any symptoms you may have, and to protect your health.Call your provider right away if you have any of the following:  Severe pain (Call 911)  Belly swelling or cramping that won’t go away  Can’t pass stool or gas  Nausea or vomiting (especially if the vomit looks or smells like stool)  Secondary Diagnosis:	Incarcerated hernia  Instructions for follow-up, activity and diet:	A hernia is when an organ or tissue pushes through a weak area in the belly (abdominal) wall. This weak area may be present at birth. Or it may be caused by abdominal strain over time. If not treated, a hernia can get worse with time and physical stress.  When a bulge forms  When there is a weak area in the abdominal wall, an organ or tissue can push outward. This often causes a bulge that you can see under your skin. The bulge may get bigger when you stand up. It may go away when you lie down. You may also feel some pressure or mild pain when lifting, coughing, urinating, or doing other activities.  Types of hernias  The type of hernia you have depends on its location. Most hernias form in the groin at or near the internal ring. This is the entrance to a canal between the abdomen and groin. Hernias can also occur in the abdomen, thigh, or genitals.  Secondary Diagnosis:	Dementia  Goal:	continue home medications  Instructions for follow-up, activity and diet:	The care of individuals with dementia is varied and dependent upon the progression of the dementia.    Use reminders, notes, or directions for daily activities or tasks. Create a calm, quiet environment.  Place large clocks and calendars prominently. Display emergency numbers and home address near all telephones. Have a consistent nighttime routine. Limit napping during the day. Attend social events that stimulate rather than overwhelm the senses. Encourage good nutrition and hydration. Reduce distractions during meal times and snacks. Monitor chewing and swallowing ability. Continue with routine vision, hearing, dental, and medical screenings. Only give over-the-counter or prescription medicines as directed by the caregiver. Monitor driving abilities. Do not allow the person to drive when safe driving is no longer possible.  [place] with an identification program which could provide location assistance in the event of a missing person situation.  SEEK MEDICAL CARE IF:  New behavioral problems start such as moodiness, aggressiveness, or seeing things that are not there (hallucinations).  Any new problem with brain function happens. This includes problems with balance, speech, or falling a lot.  Problems with swallowing develop.  Any symptoms of other illness happen.  Small changes or worsening in any aspect of brain function can be a sign that the illness is getting worse. It can also be a sign of another medical illness such as infection. Seeing a caregiver right away is important.  SEEK IMMEDIATE MEDICAL CARE IF:  A fever develops. New or worsened confusion develops. New or worsened sleepiness develops. Maintain safety.  Secondary Diagnosis:	Bronchitis  Instructions for follow-up, activity and diet:	Please complete antibiotic as ordered.   Please complete Prednisone with tapered as indicated.   Continue Inhaler treatments as ordered  Follow up with Dr. Calderon within 1-2 weeks. Principal Discharge DX:	Bowel obstruction  Goal:	resolved  Instructions for follow-up, activity and diet:	A small bowel obstruction occurs when part or all of the small intestine (bowel) is blocked. As a result, digestive contents can’t move through the bowel properly and out of the body. Treatment is needed right away to remove the blockage. This can ease painful symptoms. It can also prevent serious problems, such as tissue death or bursting (rupture) of the small bowel. Without treatment, a small bowel obstruction can be fatal.  Causes of small bowel obstruction  A small bowel obstruction can be caused by:  Scar tissue (adhesions). These may form after belly (abdominal) surgery or an infection.  Hernia. A hernia is when an organ pushes through a weak spot or tear in the abdomen wall. Part of the small bowel can push out and be seen as a bulge under the belly. Hernias can also occur internally.  Certain health problems. These include when part of the bowel slides inside another part (intussusception). Other causes include irritable bowel disease such as Crohn’s disease, and inflammation and sores in the intestine (ulcerative colitis).  Abnormal tissue growths (tumors). These can form on the inside or outside of the small bowel. They are usually due to cancer.After treatment, most people recover with no lasting effects. If a long part of the bowel is removed, there is a greater chance for lifelong digestive problems. Bowel movements may become irregular. Work with your provider to learn the best ways to manage any symptoms you may have, and to protect your health.Call your provider right away if you have any of the following:  Severe pain (Call 911)  Belly swelling or cramping that won’t go away  Can’t pass stool or gas  Nausea or vomiting (especially if the vomit looks or smells like stool)  Secondary Diagnosis:	Incarcerated hernia  Instructions for follow-up, activity and diet:	A hernia is when an organ or tissue pushes through a weak area in the belly (abdominal) wall. This weak area may be present at birth. Or it may be caused by abdominal strain over time. If not treated, a hernia can get worse with time and physical stress.  When a bulge forms  When there is a weak area in the abdominal wall, an organ or tissue can push outward. This often causes a bulge that you can see under your skin. The bulge may get bigger when you stand up. It may go away when you lie down. You may also feel some pressure or mild pain when lifting, coughing, urinating, or doing other activities.  Types of hernias  The type of hernia you have depends on its location. Most hernias form in the groin at or near the internal ring. This is the entrance to a canal between the abdomen and groin. Hernias can also occur in the abdomen, thigh, or genitals.  Secondary Diagnosis:	Dementia  Goal:	continue home medications  Instructions for follow-up, activity and diet:	The care of individuals with dementia is varied and dependent upon the progression of the dementia.    Use reminders, notes, or directions for daily activities or tasks. Create a calm, quiet environment.  Place large clocks and calendars prominently. Display emergency numbers and home address near all telephones. Have a consistent nighttime routine. Limit napping during the day. Attend social events that stimulate rather than overwhelm the senses. Encourage good nutrition and hydration. Reduce distractions during meal times and snacks. Monitor chewing and swallowing ability. Continue with routine vision, hearing, dental, and medical screenings. Only give over-the-counter or prescription medicines as directed by the caregiver. Monitor driving abilities. Do not allow the person to drive when safe driving is no longer possible.  [place] with an identification program which could provide location assistance in the event of a missing person situation.  SEEK MEDICAL CARE IF:  New behavioral problems start such as moodiness, aggressiveness, or seeing things that are not there (hallucinations).  Any new problem with brain function happens. This includes problems with balance, speech, or falling a lot.  Problems with swallowing develop.  Any symptoms of other illness happen.  Small changes or worsening in any aspect of brain function can be a sign that the illness is getting worse. It can also be a sign of another medical illness such as infection. Seeing a caregiver right away is important.  SEEK IMMEDIATE MEDICAL CARE IF:  A fever develops. New or worsened confusion develops. New or worsened sleepiness develops. Maintain safety.  Secondary Diagnosis:	Bronchitis  Instructions for follow-up, activity and diet:	Please complete antibiotic as ordered.   Please complete Prednisone with tapered as indicated.   Continue Inhaler treatments as ordered  Follow up with Dr. Calderon within 1-2 weeks.

## 2017-02-21 NOTE — DISCHARGE NOTE ADULT - MEDICATION SUMMARY - MEDICATIONS TO TAKE
I will START or STAY ON the medications listed below when I get home from the hospital:    predniSONE 20 mg oral tablet  -- 1 tab(s) by mouth 2 times a day  -- Indication: For Cough    traZODone 50 mg oral tablet  -- 0.5 tab(s) by mouth once a day  -- Verified with [facility] [phone]  -- Indication: For Psychotherapeutic Agent     sertraline 50 mg oral tablet  -- 1 tab(s) by mouth once a day (at bedtime)  -- Indication: For Psychotherapeutic Agent     metoclopramide 5 mg oral tablet  -- 1 tab(s) by mouth 2 times a day, As Needed  -- Verified with [facility] [phone]  -- Indication: For Antiemetic     allopurinol 100 mg oral tablet  -- 1 tab(s) by mouth once a day  -- Verified with [facility] [phone]  -- Indication: For Gout    simvastatin 10 mg oral tablet  -- 1 tab(s) by mouth once a day (at bedtime)  -- Indication: For Hyperlipidema     albuterol-ipratropium 2.5 mg-0.5 mg/3 mL inhalation solution  -- 3 milliliter(s) inhaled every 6 hours  -- Indication: For WHeezing/SOB    Anoro Ellipta 62.5 mcg-25 mcg inhalation powder  -- 1 puff(s) inhaled once a day  -- Verified with [facility] [phone]  -- Indication: For WHeezing/SOB    Exelon 13.3 mg/24 hr transdermal film, extended release  -- 1 patch by transdermal patch once a day  -- Verified with [facility] [phone]  -- Indication: For Dementia    ammonium lactate 12% topical lotion  -- Apply on skin to affected area 2 times a day, As Needed  -- [facility] [phone]  -- Indication: For Topical Agent     Econazole Nitrate 1% topical cream  -- Apply on skin to affected area , As Needed 1 to 2 times a day  -- [facility] [phone]  -- Indication: For Topical Agent     Flector Patch 1.3% topical film, extended release  -- Apply on skin to affected area 2 times a day, As Needed  -- Verified with [facility] [phone]  -- Indication: For Pain     guaiFENesin 100 mg/5 mL oral liquid  -- 5 milliliter(s) by mouth every 6 hours, As needed, Cough  -- Indication: For Cough     Slow Fe (as elemental iron) 45 mg oral tablet, extended release  -- 1 tab(s) by mouth every other day  -- Patient takes this on the days he doesn't take ocuvite and Geritol.   -- Indication: For Anemia     Geritol oral liquid  --  by mouth every other day  -- takes it the same day has Ocuvite  -- Indication: For Bowel obstruction    Restasis 0.05% ophthalmic emulsion  -- 1 drop(s) in each eye every 12 hours  -- [facility] with [facility] [phone]  -- Indication: For Ophthalmic Emulsion     pantoprazole 40 mg oral delayed release tablet  -- 1 tab(s) by mouth 2 times a day (before meals)  -- Indication: For GeRD    levothyroxine 50 mcg (0.05 mg) oral tablet  -- 1 tab(s) by mouth once a day  -- Indication: For Hypothyroidism    Ocuvite oral tablet  -- 1 tab(s) by mouth every other day  -- Takes it with the Geritol and Swtiches betwen Ocuvite and the Iron tabs  -- Indication: For Supplement     ascorbic acid 500 mg oral tablet  -- 1 tab(s) by mouth 2 times a day  -- [facility] [phone]  -- Indication: For Supplement     cholecalciferol 2000 intl units oral tablet  -- 2 tab(s) by mouth once a day  -- Verified with [facility] [phone]  -- Indication: For Supplement     Vitamin B-12  -- 1200 microgram(s) by mouth once a day  -- Indication: For Supplement     Vitamin B6 100 mg oral tablet  -- 1 tab(s) by mouth once a day  -- Indication: For Supplement I will START or STAY ON the medications listed below when I get home from the hospital:    predniSONE 20 mg oral tablet  -- 1 tab(s) by mouth 2 times a day  -- Indication: For Cough    traZODone 50 mg oral tablet  -- 0.5 tab(s) by mouth once a day  -- [facility] [phone]  -- Indication: For Psychotherapeutic Agent     sertraline 50 mg oral tablet  -- 1 tab(s) by mouth once a day (at bedtime)  -- Indication: For Psychotherapeutic Agent     metoclopramide 5 mg oral tablet  -- 1 tab(s) by mouth 2 times a day, As Needed  -- Verified with [facility] [phone]  -- Indication: For Antiemetic     allopurinol 100 mg oral tablet  -- 1 tab(s) by mouth once a day  -- Verified with [facility] [phone]  -- Indication: For Gout    simvastatin 10 mg oral tablet  -- 1 tab(s) by mouth once a day (at bedtime)  -- Indication: For Hyperlipidema     albuterol-ipratropium 2.5 mg-0.5 mg/3 mL inhalation solution  -- 3 milliliter(s) inhaled every 6 hours  -- Indication: For WHeezing/SOB    Anoro Ellipta 62.5 mcg-25 mcg inhalation powder  -- 1 puff(s) inhaled once a day  -- Verified with [facility] [phone]  -- Indication: For WHeezing/SOB    Exelon 13.3 mg/24 hr transdermal film, extended release  -- 1 patch by transdermal patch once a day  -- Verified with [facility] [phone]  -- Indication: For Dementia    ammonium lactate 12% topical lotion  -- Apply on skin to affected area 2 times a day, As Needed  -- [facility] [phone]  -- Indication: For Topical Agent     Econazole Nitrate 1% topical cream  -- Apply on skin to affected area , As Needed 1 to 2 times a day  -- [facility] [phone]  -- Indication: For Topical Agent     Flector Patch 1.3% topical film, extended release  -- Apply on skin to affected area 2 times a day, As Needed  -- Verified [facility] [phone]  -- Indication: For Pain     guaiFENesin 100 mg/5 mL oral liquid  -- 5 milliliter(s) by mouth every 6 hours, As needed, Cough  -- Indication: For Cough     Slow Fe (as elemental iron) 45 mg oral tablet, extended release  -- 1 tab(s) by mouth every other day  -- Patient takes this on the days he doesn't take ocuvite and Geritol.   -- Indication: For Anemia     Geritol oral liquid  --  by mouth every other day  -- takes it the same day has Ocuvite  -- Indication: For Bowel obstruction    Restasis 0.05% ophthalmic emulsion  -- 1 drop(s) in each eye every 12 hours  -- Verified with [facility] [phone]  -- Indication: For Ophthalmic Emulsion     pantoprazole 40 mg oral delayed release tablet  -- 1 tab(s) by mouth 2 times a day (before meals)  -- Indication: For GeRD    levoFLOXacin 250 mg oral tablet  -- 1 tab(s) by mouth every 24 hours  -- Indication: For Antibiotic     levothyroxine 50 mcg (0.05 mg) oral tablet  -- 1 tab(s) by mouth once a day  -- Indication: For Hypothyroidism    Ocuvite oral tablet  -- 1 tab(s) by mouth every other day  -- Takes it with the Geritol and Swtiches betwen Ocuvite and the Iron tabs  -- Indication: For Supplement     ascorbic acid 500 mg oral tablet  -- 1 tab(s) by mouth 2 times a day  -- [facility] [phone]  -- Indication: For Supplement     cholecalciferol 2000 intl units oral tablet  -- 2 tab(s) by mouth once a day  -- Verified with [facility] [phone]  -- Indication: For Supplement     Vitamin B-12  -- 1200 microgram(s) by mouth once a day  -- Indication: For Supplement     Vitamin B6 100 mg oral tablet  -- 1 tab(s) by mouth once a day  -- Indication: For Supplement I will START or STAY ON the medications listed below when I get home from the hospital:    predniSONE 20 mg oral tablet  -- 1 tab(s) by mouth 2 times a day   -- Indication: For Bronchitis     sertraline 50 mg oral tablet  -- 1 tab(s) by mouth once a day (at bedtime)  -- Indication: For Psychotherapeutic Agent     traZODone 50 mg oral tablet  -- 0.5 tab(s) by mouth once a day  -- [facility] [phone]  -- Indication: For Psychotherapeutic Agent     metoclopramide 5 mg oral tablet  -- 1 tab(s) by mouth 2 times a day, As Needed  -- Verified with [facility] [phone]  -- Indication: For Antiemetic     allopurinol 100 mg oral tablet  -- 1 tab(s) by mouth once a day  -- Verified with [facility] [phone]  -- Indication: For Gout    simvastatin 10 mg oral tablet  -- 1 tab(s) by mouth once a day (at bedtime)  -- Indication: For Hyperlipidema     albuterol-ipratropium 2.5 mg-0.5 mg/3 mL inhalation solution  -- 3 milliliter(s) inhaled every 6 hours  -- Indication: For WHeezing/SOB    Anoro Ellipta 62.5 mcg-25 mcg inhalation powder  -- 1 puff(s) inhaled once a day  -- Verified with [facility] [phone]  -- Indication: For WHeezing/SOB    Exelon 4.6 mg/24 hr transdermal film, extended release  -- 1 patch by transdermal patch once a day to be tapered up as per medical team   -- Avoid prolonged or excessive exposure to direct and/or artificial sunlight while taking this medication.  Check with your doctor before becoming pregnant.  May cause drowsiness or dizziness.  Obtain medical advice before taking any non-prescription drugs as some may affect the action of this medication.  This drug may impair the ability to drive or operate machinery.  Use care until you become familiar with its effects.    -- Indication: For Dementia    Flector Patch 1.3% topical film, extended release  -- Apply on skin to affected area 2 times a day, As Needed  -- Ver[facility] [phone]  -- Indication: For Pain     ammonium lactate 12% topical lotion  -- Apply on skin to affected area 2 times a day, As Needed  -- Verified [facility] [phone]  -- Indication: For Topical Agent     Econazole Nitrate 1% topical cream  -- Apply on skin to affected area , As Needed 1 to 2 times a day  -- Verified with [facility] [phone]  -- Indication: For Topical Agent     guaiFENesin 100 mg/5 mL oral liquid  -- 5 milliliter(s) by mouth every 6 hours, As needed, Cough  -- Indication: For Cough     Slow Fe (as elemental iron) 45 mg oral tablet, extended release  -- 1 tab(s) by mouth every other day  -- Patient takes this on the days he doesn't take ocuvite and Geritol.   -- Indication: For Anemia     Geritol oral liquid  --  by mouth every other day  -- takes it the same day has Ocuvite  -- Indication: For Bowel obstruction    Restasis 0.05% ophthalmic emulsion  -- 1 drop(s) in each eye every 12 hours  -- [facility] [phone]  -- Indication: For Ophthalmic Emulsion     pantoprazole 40 mg oral delayed release tablet  -- 1 tab(s) by mouth 2 times a day (before meals)  -- Indication: For GeRD    levoFLOXacin 250 mg oral tablet  -- 1 tab(s) by mouth every 24 hours for 8 days  -- Indication: For Bronchitis     levothyroxine 50 mcg (0.05 mg) oral tablet  -- 1 tab(s) by mouth once a day  -- Indication: For Hypothyroidism    Ocuvite oral tablet  -- 1 tab(s) by mouth every other day  -- Takes it with the Geritol and Swtiches betwen Ocuvite and the Iron tabs  -- Indication: For Supplement     ascorbic acid 500 mg oral tablet  -- 1 tab(s) by mouth 2 times a day  -- Verified with [facility] [phone]  -- Indication: For Supplement     cholecalciferol 2000 intl units oral tablet  -- 2 tab(s) by mouth once a day  -- Verified with [facility] [phone]  -- Indication: For Supplement     Vitamin B-12  -- 1200 microgram(s) by mouth once a day  -- Indication: For Supplement     Vitamin B6 100 mg oral tablet  -- 1 tab(s) by mouth once a day  -- Indication: For Supplement I will START or STAY ON the medications listed below when I get home from the hospital:    predniSONE 20 mg oral tablet  -- 1 tab(s) by mouth 2 times a day to be completed on 2/26/2017 and continue taper as ordered.   -- Indication: For Bronchitis    sertraline 50 mg oral tablet  -- 1 tab(s) by mouth once a day (at bedtime)  -- Indication: For Psychotherapeutic Agent     traZODone 50 mg oral tablet  -- 0.5 tab(s) by mouth once a day  -- Verified with [facility] [phone]  -- Indication: For Psychotherapeutic Agent     metoclopramide 5 mg oral tablet  -- 1 tab(s) by mouth 2 times a day, As Needed  -- Verified with [facility] [phone]  -- Indication: For Antiemetic     allopurinol 100 mg oral tablet  -- 1 tab(s) by mouth once a day  -- Verified with [facility] [phone]  -- Indication: For Gout    simvastatin 10 mg oral tablet  -- 1 tab(s) by mouth once a day (at bedtime)  -- Indication: For Hyperlipidema     albuterol-ipratropium 2.5 mg-0.5 mg/3 mL inhalation solution  -- 3 milliliter(s) inhaled every 6 hours  -- Indication: For WHeezing/SOB    Anoro Ellipta 62.5 mcg-25 mcg inhalation powder  -- 1 puff(s) inhaled once a day  -- Verified with [facility] [phone]  -- Indication: For WHeezing/SOB    Exelon 4.6 mg/24 hr transdermal film, extended release  -- 1 patch by transdermal patch once a day to be tapered up as per medical team   -- Avoid prolonged or excessive exposure to direct and/or artificial sunlight while taking this medication.  Check with your doctor before becoming pregnant.  May cause drowsiness or dizziness.  Obtain medical advice before taking any non-prescription drugs as some may affect the action of this medication.  This drug may impair the ability to drive or operate machinery.  Use care until you become familiar with its effects.    -- Indication: For Dementia    Flector Patch 1.3% topical film, extended release  -- Apply on skin to affected area 2 times a day, As Needed  -- Verified with [facility] [phone]  -- Indication: For Pain     ammonium lactate 12% topical lotion  -- Apply on skin to affected area 2 times a day, As Needed  -- Verified with [facility] [phone]  -- Indication: For Topical Agent     Econazole Nitrate 1% topical cream  -- Apply on skin to affected area , As Needed 1 to 2 times a day  -- Verified with [facility] [phone]  -- Indication: For Topical Agent     guaiFENesin 100 mg/5 mL oral liquid  -- 5 milliliter(s) by mouth every 6 hours, As needed, Cough  -- Indication: For Cough     Slow Fe (as elemental iron) 45 mg oral tablet, extended release  -- 1 tab(s) by mouth every other day  -- Patient takes this on the days he doesn't take ocuvite and Geritol.   -- Indication: For Anemia     Geritol oral liquid  --  by mouth every other day  -- takes it the same day has Ocuvite  -- Indication: For Bowel obstruction    Restasis 0.05% ophthalmic emulsion  -- 1 drop(s) in each eye every 12 hours  -- Verified with [facility] [phone]  -- Indication: For Ophthalmic Emulsion     pantoprazole 40 mg oral delayed release tablet  -- 1 tab(s) by mouth 2 times a day (before meals)  -- Indication: For GeRD    levoFLOXacin 250 mg oral tablet  -- 1 tab(s) by mouth every 24 hours for 8 days  -- Indication: For Bronchitis     levothyroxine 50 mcg (0.05 mg) oral tablet  -- 1 tab(s) by mouth once a day  -- Indication: For Hypothyroidism    Ocuvite oral tablet  -- 1 tab(s) by mouth every other day  -- Takes it with the Geritol and Swtiches betwen Ocuvite and the Iron tabs  -- Indication: For Supplement     ascorbic acid 500 mg oral tablet  -- 1 tab(s) by mouth 2 times a day  -- Ver[facility] [phone]  -- Indication: For Supplement     cholecalciferol 2000 intl units oral tablet  -- 2 tab(s) by mouth once a day  -- Verified with [facility] [phone]  -- Indication: For Supplement     Vitamin B-12  -- 1200 microgram(s) by mouth once a day  -- Indication: For Supplement     Vitamin B6 100 mg oral tablet  -- 1 tab(s) by mouth once a day  -- Indication: For Supplement

## 2017-02-21 NOTE — DISCHARGE NOTE ADULT - PATIENT PORTAL LINK FT
“You can access the [facility] Patient Portal, offered by [facility], by registering with the following website: [email or web address]”

## 2017-02-21 NOTE — DISCHARGE NOTE ADULT - CARE PROVIDER_API CALL
Goldberg, Steven M (MD), Cardiovascular Disease; Internal Medicine  [address] [place] [address]  Phone: [phone]  Fax: [phone] Goldberg, Steven M (MD), Cardiovascular Disease; Internal Medicine  [address] [place] [address]  Phone: [phone]  Fax: [phone]    Luis Daniel Calderon), Internal Medicine; Pulmonary Disease  [address] [place] [address]  Phone: [phone]  Fax: [phone]

## 2017-02-21 NOTE — DISCHARGE NOTE ADULT - HOSPITAL COURSE
to be completed by attending 02/14: Surgery: On PPI gtt, Surgery foLlowing, currently NPO, Pain management.   Monitoring BP closely post reduction as there is risk that reduced bowel could cause peritonitis and significant clinical worsening.   MAYA on IVF.   CT Abd/Pelvis: 02/13: Small bowel obstruction with closed-loop morphology and 2 transition points at the neck of a large right inguinal hernia. Associated mesenteric edema and fluid within the hernia sac. No definite CT evidence of ischemia.  CXR: 02/13: No focal consolidation. No free air.  Pt doesn't want surgery, need Palliative C/S.   Signed out to 8M NP.   215   Seen by Sx, cleared for regular food, DC plan in am, but unable to leave until            Friday (medicare requirement)            No teeth, asking for pureed food            Son ref palliative  2/16   Family  Asking for palliative now, called,             Dr Yuen ([facility] ) will see him today            Dr Luis spoke to Sx to re consider for surgery as per family's req  2/17 : If surgery decline .patient will require LAWANDA  2/18    Coughed up small amount of blood tinged sputum, DC heparin SQ              Stop O2 NC (O2 sat 96% RA after ambulation) DW DR Luis            Seen by surgeon, decided not to have Sx. Plan for Rehab  2/21	LAWANDA tomorrow; Now DNR/DNI  2/22  For Discharge to [facility] in AM.

## 2017-02-21 NOTE — DISCHARGE NOTE ADULT - MEDICATION SUMMARY - MEDICATIONS TO CHANGE
I will SWITCH the dose or number of times a day I take the medications listed below when I get home from the hospital:  None I will SWITCH the dose or number of times a day I take the medications listed below when I get home from the hospital:    Exelon 13.3 mg/24 hr transdermal film, extended release  -- 1 patch by transdermal patch once a day  -- Verified with [facility] [phone] I will SWITCH the dose or number of times a day I take the medications listed below when I get home from the hospital:    Exelon 13.3 mg/24 hr transdermal film, extended release  -- 1 patch by transdermal patch once a day  -- Verified with [facility] [phone]

## 2017-02-22 LAB
ANION GAP SERPL CALC-SCNC: 15 MMOL/L — SIGNIFICANT CHANGE UP (ref 5–17)
BUN SERPL-MCNC: 14 MG/DL — SIGNIFICANT CHANGE UP (ref 7–23)
CALCIUM SERPL-MCNC: 8.5 MG/DL — SIGNIFICANT CHANGE UP (ref 8.4–10.5)
CHLORIDE SERPL-SCNC: 101 MMOL/L — SIGNIFICANT CHANGE UP (ref 96–108)
CO2 SERPL-SCNC: 22 MMOL/L — SIGNIFICANT CHANGE UP (ref 22–31)
CREAT SERPL-MCNC: 1.26 MG/DL — SIGNIFICANT CHANGE UP (ref 0.5–1.3)
GLUCOSE SERPL-MCNC: 96 MG/DL — SIGNIFICANT CHANGE UP (ref 70–99)
HCT VFR BLD CALC: 25.5 % — LOW (ref 39–50)
HGB BLD-MCNC: 8.3 G/DL — LOW (ref 13–17)
MCHC RBC-ENTMCNC: 29 PG — SIGNIFICANT CHANGE UP (ref 27–34)
MCHC RBC-ENTMCNC: 32.5 GM/DL — SIGNIFICANT CHANGE UP (ref 32–36)
MCV RBC AUTO: 89.2 FL — SIGNIFICANT CHANGE UP (ref 80–100)
PLATELET # BLD AUTO: 294 K/UL — SIGNIFICANT CHANGE UP (ref 150–400)
POTASSIUM SERPL-MCNC: 4.1 MMOL/L — SIGNIFICANT CHANGE UP (ref 3.5–5.3)
POTASSIUM SERPL-SCNC: 4.1 MMOL/L — SIGNIFICANT CHANGE UP (ref 3.5–5.3)
RBC # BLD: 2.86 M/UL — LOW (ref 4.2–5.8)
RBC # FLD: 13.5 % — SIGNIFICANT CHANGE UP (ref 10.3–14.5)
SODIUM SERPL-SCNC: 138 MMOL/L — SIGNIFICANT CHANGE UP (ref 135–145)
WBC # BLD: 10.38 K/UL — SIGNIFICANT CHANGE UP (ref 3.8–10.5)
WBC # FLD AUTO: 10.38 K/UL — SIGNIFICANT CHANGE UP (ref 3.8–10.5)

## 2017-02-22 PROCEDURE — 99233 SBSQ HOSP IP/OBS HIGH 50: CPT

## 2017-02-22 PROCEDURE — 99231 SBSQ HOSP IP/OBS SF/LOW 25: CPT

## 2017-02-22 RX ADMIN — Medication 20 MILLIGRAM(S): at 06:08

## 2017-02-22 RX ADMIN — Medication 3 MILLILITER(S): at 17:25

## 2017-02-22 RX ADMIN — TIOTROPIUM BROMIDE 1 CAPSULE(S): 18 CAPSULE ORAL; RESPIRATORY (INHALATION) at 11:54

## 2017-02-22 RX ADMIN — SIMVASTATIN 10 MILLIGRAM(S): 20 TABLET, FILM COATED ORAL at 21:04

## 2017-02-22 RX ADMIN — Medication 100 MILLIGRAM(S): at 16:59

## 2017-02-22 RX ADMIN — Medication 50 MICROGRAM(S): at 05:18

## 2017-02-22 RX ADMIN — Medication 100 MILLIGRAM(S): at 21:04

## 2017-02-22 RX ADMIN — PANTOPRAZOLE SODIUM 40 MILLIGRAM(S): 20 TABLET, DELAYED RELEASE ORAL at 16:59

## 2017-02-22 RX ADMIN — Medication 20 MILLIGRAM(S): at 17:28

## 2017-02-22 RX ADMIN — Medication 3 MILLILITER(S): at 11:54

## 2017-02-22 RX ADMIN — SERTRALINE 50 MILLIGRAM(S): 25 TABLET, FILM COATED ORAL at 21:04

## 2017-02-22 RX ADMIN — Medication 3 MILLILITER(S): at 05:17

## 2017-02-22 RX ADMIN — PANTOPRAZOLE SODIUM 40 MILLIGRAM(S): 20 TABLET, DELAYED RELEASE ORAL at 05:18

## 2017-02-22 RX ADMIN — Medication 100 MILLIGRAM(S): at 01:24

## 2017-02-22 RX ADMIN — Medication 0.5 MILLIGRAM(S): at 17:25

## 2017-02-22 RX ADMIN — Medication 100 MILLIGRAM(S): at 13:56

## 2017-02-22 RX ADMIN — Medication 0.5 MILLIGRAM(S): at 05:17

## 2017-02-22 RX ADMIN — Medication 100 MILLIGRAM(S): at 05:17

## 2017-02-22 NOTE — PROVIDER CONTACT NOTE (OTHER) - ACTION/TREATMENT ORDERED:
Pt given Robitussin 100mg syrup, placed on 2L NC, Repeat VS 02 94%. Pt stable at this time, will continue to monitor.

## 2017-02-23 VITALS
OXYGEN SATURATION: 93 % | TEMPERATURE: 98 F | HEART RATE: 68 BPM | RESPIRATION RATE: 18 BRPM | DIASTOLIC BLOOD PRESSURE: 64 MMHG | SYSTOLIC BLOOD PRESSURE: 115 MMHG

## 2017-02-23 PROCEDURE — 99233 SBSQ HOSP IP/OBS HIGH 50: CPT

## 2017-02-23 PROCEDURE — 96375 TX/PRO/DX INJ NEW DRUG ADDON: CPT | Mod: XU

## 2017-02-23 PROCEDURE — 80053 COMPREHEN METABOLIC PANEL: CPT

## 2017-02-23 PROCEDURE — 83735 ASSAY OF MAGNESIUM: CPT

## 2017-02-23 PROCEDURE — 80048 BASIC METABOLIC PNL TOTAL CA: CPT

## 2017-02-23 PROCEDURE — 74020: CPT

## 2017-02-23 PROCEDURE — 82435 ASSAY OF BLOOD CHLORIDE: CPT

## 2017-02-23 PROCEDURE — 74174 CTA ABD&PLVS W/CONTRAST: CPT

## 2017-02-23 PROCEDURE — 99285 EMERGENCY DEPT VISIT HI MDM: CPT | Mod: 25

## 2017-02-23 PROCEDURE — 85027 COMPLETE CBC AUTOMATED: CPT

## 2017-02-23 PROCEDURE — 85014 HEMATOCRIT: CPT

## 2017-02-23 PROCEDURE — 82272 OCCULT BLD FECES 1-3 TESTS: CPT

## 2017-02-23 PROCEDURE — 84132 ASSAY OF SERUM POTASSIUM: CPT

## 2017-02-23 PROCEDURE — 82553 CREATINE MB FRACTION: CPT

## 2017-02-23 PROCEDURE — 96374 THER/PROPH/DIAG INJ IV PUSH: CPT | Mod: XU

## 2017-02-23 PROCEDURE — 93005 ELECTROCARDIOGRAM TRACING: CPT

## 2017-02-23 PROCEDURE — 83605 ASSAY OF LACTIC ACID: CPT

## 2017-02-23 PROCEDURE — 85610 PROTHROMBIN TIME: CPT

## 2017-02-23 PROCEDURE — 82947 ASSAY GLUCOSE BLOOD QUANT: CPT

## 2017-02-23 PROCEDURE — 87070 CULTURE OTHR SPECIMN AEROBIC: CPT

## 2017-02-23 PROCEDURE — 97162 PT EVAL MOD COMPLEX 30 MIN: CPT

## 2017-02-23 PROCEDURE — 71045 X-RAY EXAM CHEST 1 VIEW: CPT

## 2017-02-23 PROCEDURE — 94640 AIRWAY INHALATION TREATMENT: CPT

## 2017-02-23 PROCEDURE — 97110 THERAPEUTIC EXERCISES: CPT

## 2017-02-23 PROCEDURE — 85730 THROMBOPLASTIN TIME PARTIAL: CPT

## 2017-02-23 PROCEDURE — 84295 ASSAY OF SERUM SODIUM: CPT

## 2017-02-23 PROCEDURE — 97116 GAIT TRAINING THERAPY: CPT

## 2017-02-23 PROCEDURE — 82803 BLOOD GASES ANY COMBINATION: CPT

## 2017-02-23 PROCEDURE — 84484 ASSAY OF TROPONIN QUANT: CPT

## 2017-02-23 PROCEDURE — 82550 ASSAY OF CK (CPK): CPT

## 2017-02-23 PROCEDURE — 82330 ASSAY OF CALCIUM: CPT

## 2017-02-23 PROCEDURE — 96376 TX/PRO/DX INJ SAME DRUG ADON: CPT | Mod: XU

## 2017-02-23 RX ORDER — RIVASTIGMINE 4.6 MG/24H
1 PATCH, EXTENDED RELEASE TRANSDERMAL
Qty: 0 | Refills: 0 | COMMUNITY

## 2017-02-23 RX ORDER — RIVASTIGMINE 4.6 MG/24H
1 PATCH, EXTENDED RELEASE TRANSDERMAL
Qty: 30 | Refills: 0 | OUTPATIENT
Start: 2017-02-23 | End: 2017-03-25

## 2017-02-23 RX ORDER — BUDESONIDE, MICRONIZED 100 %
1 POWDER (GRAM) MISCELLANEOUS
Qty: 1 | Refills: 0 | OUTPATIENT
Start: 2017-02-23 | End: 2017-03-25

## 2017-02-23 RX ADMIN — Medication 3 MILLILITER(S): at 00:01

## 2017-02-23 RX ADMIN — Medication 3 MILLILITER(S): at 11:26

## 2017-02-23 RX ADMIN — Medication 20 MILLIGRAM(S): at 05:19

## 2017-02-23 RX ADMIN — Medication 3 MILLILITER(S): at 05:19

## 2017-02-23 RX ADMIN — Medication 0.5 MILLIGRAM(S): at 05:19

## 2017-02-23 RX ADMIN — Medication 50 MICROGRAM(S): at 05:19

## 2017-02-23 RX ADMIN — Medication 100 MILLIGRAM(S): at 05:19

## 2017-02-23 RX ADMIN — Medication 100 MILLIGRAM(S): at 01:04

## 2017-02-23 RX ADMIN — TIOTROPIUM BROMIDE 1 CAPSULE(S): 18 CAPSULE ORAL; RESPIRATORY (INHALATION) at 11:26

## 2017-02-23 RX ADMIN — PANTOPRAZOLE SODIUM 40 MILLIGRAM(S): 20 TABLET, DELAYED RELEASE ORAL at 08:26

## 2017-02-23 RX ADMIN — ONDANSETRON 4 MILLIGRAM(S): 8 TABLET, FILM COATED ORAL at 01:55

## 2017-03-14 ENCOUNTER — APPOINTMENT (OUTPATIENT)
Dept: PULMONOLOGY | Facility: CLINIC | Age: 82
End: 2017-03-14

## 2017-04-11 ENCOUNTER — APPOINTMENT (OUTPATIENT)
Dept: PULMONOLOGY | Facility: CLINIC | Age: 82
End: 2017-04-11

## 2017-04-11 VITALS
RESPIRATION RATE: 17 BRPM | BODY MASS INDEX: 23.14 KG/M2 | DIASTOLIC BLOOD PRESSURE: 60 MMHG | HEART RATE: 65 BPM | HEIGHT: 66 IN | OXYGEN SATURATION: 99 % | SYSTOLIC BLOOD PRESSURE: 120 MMHG | WEIGHT: 144 LBS

## 2017-04-11 DIAGNOSIS — J41.1 MUCOPURULENT CHRONIC BRONCHITIS: ICD-10-CM

## 2017-04-11 DIAGNOSIS — K21.9 GASTRO-ESOPHAGEAL REFLUX DISEASE W/OUT ESOPHAGITIS: ICD-10-CM

## 2017-04-11 DIAGNOSIS — R06.02 SHORTNESS OF BREATH: ICD-10-CM

## 2017-04-11 DIAGNOSIS — J45.909 UNSPECIFIED ASTHMA, UNCOMPLICATED: ICD-10-CM

## 2017-04-11 DIAGNOSIS — R05 COUGH: ICD-10-CM

## 2017-04-11 DIAGNOSIS — J31.0 CHRONIC RHINITIS: ICD-10-CM

## 2017-06-15 ENCOUNTER — INPATIENT (INPATIENT)
Facility: HOSPITAL | Age: 82
LOS: 8 days | DRG: 393 | End: 2017-06-24
Attending: INTERNAL MEDICINE | Admitting: INTERNAL MEDICINE
Payer: MEDICARE

## 2017-06-15 VITALS
HEIGHT: 67 IN | HEART RATE: 69 BPM | OXYGEN SATURATION: 95 % | DIASTOLIC BLOOD PRESSURE: 84 MMHG | RESPIRATION RATE: 20 BRPM | SYSTOLIC BLOOD PRESSURE: 165 MMHG | TEMPERATURE: 98 F

## 2017-06-15 DIAGNOSIS — R52 PAIN, UNSPECIFIED: ICD-10-CM

## 2017-06-15 DIAGNOSIS — K56.69 OTHER INTESTINAL OBSTRUCTION: ICD-10-CM

## 2017-06-15 DIAGNOSIS — Z51.5 ENCOUNTER FOR PALLIATIVE CARE: ICD-10-CM

## 2017-06-15 DIAGNOSIS — F41.9 ANXIETY DISORDER, UNSPECIFIED: ICD-10-CM

## 2017-06-15 DIAGNOSIS — R53.81 OTHER MALAISE: ICD-10-CM

## 2017-06-15 DIAGNOSIS — E03.9 HYPOTHYROIDISM, UNSPECIFIED: ICD-10-CM

## 2017-06-15 DIAGNOSIS — K21.9 GASTRO-ESOPHAGEAL REFLUX DISEASE WITHOUT ESOPHAGITIS: ICD-10-CM

## 2017-06-15 DIAGNOSIS — K45.0 OTHER SPECIFIED ABDOMINAL HERNIA WITH OBSTRUCTION, WITHOUT GANGRENE: ICD-10-CM

## 2017-06-15 LAB
ALBUMIN SERPL ELPH-MCNC: 4.7 G/DL — SIGNIFICANT CHANGE UP (ref 3.3–5)
ALP SERPL-CCNC: 100 U/L — SIGNIFICANT CHANGE UP (ref 40–120)
ALT FLD-CCNC: 14 U/L RC — SIGNIFICANT CHANGE UP (ref 10–45)
ANION GAP SERPL CALC-SCNC: 19 MMOL/L — HIGH (ref 5–17)
APTT BLD: 28.6 SEC — SIGNIFICANT CHANGE UP (ref 27.5–37.4)
AST SERPL-CCNC: 24 U/L — SIGNIFICANT CHANGE UP (ref 10–40)
BASOPHILS # BLD AUTO: 0.1 K/UL — SIGNIFICANT CHANGE UP (ref 0–0.2)
BASOPHILS NFR BLD AUTO: 0.4 % — SIGNIFICANT CHANGE UP (ref 0–2)
BILIRUB SERPL-MCNC: 0.3 MG/DL — SIGNIFICANT CHANGE UP (ref 0.2–1.2)
BLD GP AB SCN SERPL QL: NEGATIVE — SIGNIFICANT CHANGE UP
BUN SERPL-MCNC: 27 MG/DL — HIGH (ref 7–23)
CALCIUM SERPL-MCNC: 10.7 MG/DL — HIGH (ref 8.4–10.5)
CHLORIDE SERPL-SCNC: 95 MMOL/L — LOW (ref 96–108)
CO2 SERPL-SCNC: 23 MMOL/L — SIGNIFICANT CHANGE UP (ref 22–31)
CREAT SERPL-MCNC: 1.32 MG/DL — HIGH (ref 0.5–1.3)
EOSINOPHIL # BLD AUTO: 0.1 K/UL — SIGNIFICANT CHANGE UP (ref 0–0.5)
EOSINOPHIL NFR BLD AUTO: 0.4 % — SIGNIFICANT CHANGE UP (ref 0–6)
GAS PNL BLDV: SIGNIFICANT CHANGE UP
GLUCOSE SERPL-MCNC: 131 MG/DL — HIGH (ref 70–99)
HCT VFR BLD CALC: 41.2 % — SIGNIFICANT CHANGE UP (ref 39–50)
HGB BLD-MCNC: 12.6 G/DL — LOW (ref 13–17)
INR BLD: 1.02 RATIO — SIGNIFICANT CHANGE UP (ref 0.88–1.16)
LIDOCAIN IGE QN: 17 U/L — SIGNIFICANT CHANGE UP (ref 7–60)
LYMPHOCYTES # BLD AUTO: 1.9 K/UL — SIGNIFICANT CHANGE UP (ref 1–3.3)
LYMPHOCYTES # BLD AUTO: 11.5 % — LOW (ref 13–44)
MCHC RBC-ENTMCNC: 27.5 PG — SIGNIFICANT CHANGE UP (ref 27–34)
MCHC RBC-ENTMCNC: 30.7 GM/DL — LOW (ref 32–36)
MCV RBC AUTO: 89.9 FL — SIGNIFICANT CHANGE UP (ref 80–100)
MONOCYTES # BLD AUTO: 1.1 K/UL — HIGH (ref 0–0.9)
MONOCYTES NFR BLD AUTO: 6.6 % — SIGNIFICANT CHANGE UP (ref 2–14)
NEUTROPHILS # BLD AUTO: 13.4 K/UL — HIGH (ref 1.8–7.4)
NEUTROPHILS NFR BLD AUTO: 81.1 % — HIGH (ref 43–77)
PLATELET # BLD AUTO: 346 K/UL — SIGNIFICANT CHANGE UP (ref 150–400)
POTASSIUM SERPL-MCNC: 4.6 MMOL/L — SIGNIFICANT CHANGE UP (ref 3.5–5.3)
POTASSIUM SERPL-SCNC: 4.6 MMOL/L — SIGNIFICANT CHANGE UP (ref 3.5–5.3)
PROT SERPL-MCNC: 8.3 G/DL — SIGNIFICANT CHANGE UP (ref 6–8.3)
PROTHROM AB SERPL-ACNC: 11.1 SEC — SIGNIFICANT CHANGE UP (ref 9.8–12.7)
RBC # BLD: 4.59 M/UL — SIGNIFICANT CHANGE UP (ref 4.2–5.8)
RBC # FLD: 14.4 % — SIGNIFICANT CHANGE UP (ref 10.3–14.5)
RH IG SCN BLD-IMP: POSITIVE — SIGNIFICANT CHANGE UP
SODIUM SERPL-SCNC: 137 MMOL/L — SIGNIFICANT CHANGE UP (ref 135–145)
WBC # BLD: 16.5 K/UL — HIGH (ref 3.8–10.5)
WBC # FLD AUTO: 16.5 K/UL — HIGH (ref 3.8–10.5)

## 2017-06-15 PROCEDURE — 99497 ADVNCD CARE PLAN 30 MIN: CPT | Mod: 25

## 2017-06-15 PROCEDURE — 99223 1ST HOSP IP/OBS HIGH 75: CPT

## 2017-06-15 PROCEDURE — 74177 CT ABD & PELVIS W/CONTRAST: CPT | Mod: 26

## 2017-06-15 PROCEDURE — 99285 EMERGENCY DEPT VISIT HI MDM: CPT | Mod: GC

## 2017-06-15 PROCEDURE — 99221 1ST HOSP IP/OBS SF/LOW 40: CPT

## 2017-06-15 RX ORDER — IPRATROPIUM/ALBUTEROL SULFATE 18-103MCG
3 AEROSOL WITH ADAPTER (GRAM) INHALATION
Qty: 0 | Refills: 0 | Status: DISCONTINUED | OUTPATIENT
Start: 2017-06-15 | End: 2017-06-18

## 2017-06-15 RX ORDER — ACETAMINOPHEN 500 MG
1000 TABLET ORAL ONCE
Qty: 0 | Refills: 0 | Status: COMPLETED | OUTPATIENT
Start: 2017-06-15 | End: 2017-06-15

## 2017-06-15 RX ORDER — PIPERACILLIN AND TAZOBACTAM 4; .5 G/20ML; G/20ML
3.38 INJECTION, POWDER, LYOPHILIZED, FOR SOLUTION INTRAVENOUS ONCE
Qty: 0 | Refills: 0 | Status: COMPLETED | OUTPATIENT
Start: 2017-06-15 | End: 2017-06-15

## 2017-06-15 RX ORDER — LEVOTHYROXINE SODIUM 125 MCG
25 TABLET ORAL DAILY
Qty: 0 | Refills: 0 | Status: DISCONTINUED | OUTPATIENT
Start: 2017-06-15 | End: 2017-06-19

## 2017-06-15 RX ORDER — PANTOPRAZOLE SODIUM 20 MG/1
40 TABLET, DELAYED RELEASE ORAL EVERY 12 HOURS
Qty: 0 | Refills: 0 | Status: DISCONTINUED | OUTPATIENT
Start: 2017-06-15 | End: 2017-06-19

## 2017-06-15 RX ORDER — IPRATROPIUM BROMIDE 21 MCG
1 AEROSOL, SPRAY (ML) NASAL
Qty: 0 | Refills: 0 | COMMUNITY

## 2017-06-15 RX ORDER — METOCLOPRAMIDE HCL 10 MG
1 TABLET ORAL
Qty: 0 | Refills: 0 | COMMUNITY

## 2017-06-15 RX ORDER — DEXAMETHASONE 0.5 MG/5ML
2 ELIXIR ORAL DAILY
Qty: 0 | Refills: 0 | Status: DISCONTINUED | OUTPATIENT
Start: 2017-06-16 | End: 2017-06-17

## 2017-06-15 RX ORDER — SODIUM CHLORIDE 9 MG/ML
3 INJECTION INTRAMUSCULAR; INTRAVENOUS; SUBCUTANEOUS ONCE
Qty: 0 | Refills: 0 | Status: COMPLETED | OUTPATIENT
Start: 2017-06-15 | End: 2017-06-15

## 2017-06-15 RX ORDER — TRAZODONE HCL 50 MG
0.5 TABLET ORAL
Qty: 0 | Refills: 0 | COMMUNITY

## 2017-06-15 RX ORDER — LANOLIN ALCOHOL/MO/W.PET/CERES
1 CREAM (GRAM) TOPICAL
Qty: 0 | Refills: 0 | COMMUNITY

## 2017-06-15 RX ORDER — RIVASTIGMINE 4.6 MG/24H
1 PATCH, EXTENDED RELEASE TRANSDERMAL
Qty: 0 | Refills: 0 | COMMUNITY

## 2017-06-15 RX ORDER — PYRIDOXINE HCL (VITAMIN B6) 100 MG
1 TABLET ORAL
Qty: 0 | Refills: 0 | COMMUNITY

## 2017-06-15 RX ORDER — DICLOFENAC SODIUM 30 MG/G
1 GEL TOPICAL
Qty: 0 | Refills: 0 | COMMUNITY

## 2017-06-15 RX ORDER — SOD,AMMONIUM,POTASSIUM LACTATE
1 CREAM (GRAM) TOPICAL
Qty: 0 | Refills: 0 | COMMUNITY

## 2017-06-15 RX ORDER — SODIUM CHLORIDE 9 MG/ML
1000 INJECTION INTRAMUSCULAR; INTRAVENOUS; SUBCUTANEOUS
Qty: 0 | Refills: 0 | Status: DISCONTINUED | OUTPATIENT
Start: 2017-06-15 | End: 2017-06-17

## 2017-06-15 RX ORDER — LANOLIN/MINERAL OIL
1 LOTION (ML) TOPICAL
Qty: 0 | Refills: 0 | COMMUNITY

## 2017-06-15 RX ORDER — FERROUS SULFATE 325(65) MG
1 TABLET ORAL
Qty: 0 | Refills: 0 | COMMUNITY

## 2017-06-15 RX ORDER — HALOPERIDOL DECANOATE 100 MG/ML
0.5 INJECTION INTRAMUSCULAR
Qty: 0 | Refills: 0 | Status: DISCONTINUED | OUTPATIENT
Start: 2017-06-15 | End: 2017-06-19

## 2017-06-15 RX ORDER — MORPHINE SULFATE 50 MG/1
2 CAPSULE, EXTENDED RELEASE ORAL
Qty: 0 | Refills: 0 | Status: DISCONTINUED | OUTPATIENT
Start: 2017-06-15 | End: 2017-06-16

## 2017-06-15 RX ORDER — PANTOPRAZOLE SODIUM 20 MG/1
40 TABLET, DELAYED RELEASE ORAL DAILY
Qty: 0 | Refills: 0 | Status: DISCONTINUED | OUTPATIENT
Start: 2017-06-15 | End: 2017-06-15

## 2017-06-15 RX ORDER — MULTIVIT-MIN/FERROUS GLUCONATE 9 MG/15 ML
1 LIQUID (ML) ORAL
Qty: 0 | Refills: 0 | COMMUNITY

## 2017-06-15 RX ORDER — PIPERACILLIN AND TAZOBACTAM 4; .5 G/20ML; G/20ML
3.38 INJECTION, POWDER, LYOPHILIZED, FOR SOLUTION INTRAVENOUS EVERY 8 HOURS
Qty: 0 | Refills: 0 | Status: DISCONTINUED | OUTPATIENT
Start: 2017-06-15 | End: 2017-06-22

## 2017-06-15 RX ORDER — MORPHINE SULFATE 50 MG/1
4 CAPSULE, EXTENDED RELEASE ORAL ONCE
Qty: 0 | Refills: 0 | Status: DISCONTINUED | OUTPATIENT
Start: 2017-06-15 | End: 2017-06-15

## 2017-06-15 RX ORDER — ONDANSETRON 8 MG/1
4 TABLET, FILM COATED ORAL ONCE
Qty: 0 | Refills: 0 | Status: COMPLETED | OUTPATIENT
Start: 2017-06-15 | End: 2017-06-15

## 2017-06-15 RX ORDER — ONDANSETRON 8 MG/1
4 TABLET, FILM COATED ORAL
Qty: 0 | Refills: 0 | Status: DISCONTINUED | OUTPATIENT
Start: 2017-06-15 | End: 2017-06-19

## 2017-06-15 RX ORDER — IPRATROPIUM/ALBUTEROL SULFATE 18-103MCG
3 AEROSOL WITH ADAPTER (GRAM) INHALATION EVERY 6 HOURS
Qty: 0 | Refills: 0 | Status: DISCONTINUED | OUTPATIENT
Start: 2017-06-15 | End: 2017-06-15

## 2017-06-15 RX ORDER — BENZOYL PEROXIDE MICRONIZED 5.8 %
0 TOWELETTE (EA) TOPICAL
Qty: 0 | Refills: 0 | COMMUNITY

## 2017-06-15 RX ORDER — SODIUM CHLORIDE 9 MG/ML
1000 INJECTION INTRAMUSCULAR; INTRAVENOUS; SUBCUTANEOUS ONCE
Qty: 0 | Refills: 0 | Status: COMPLETED | OUTPATIENT
Start: 2017-06-15 | End: 2017-06-15

## 2017-06-15 RX ORDER — HALOPERIDOL DECANOATE 100 MG/ML
0.5 INJECTION INTRAMUSCULAR EVERY 4 HOURS
Qty: 0 | Refills: 0 | Status: DISCONTINUED | OUTPATIENT
Start: 2017-06-15 | End: 2017-06-19

## 2017-06-15 RX ORDER — HALOPERIDOL DECANOATE 100 MG/ML
0.5 INJECTION INTRAMUSCULAR EVERY 4 HOURS
Qty: 0 | Refills: 0 | Status: DISCONTINUED | OUTPATIENT
Start: 2017-06-15 | End: 2017-06-15

## 2017-06-15 RX ADMIN — Medication 400 MILLIGRAM(S): at 05:56

## 2017-06-15 RX ADMIN — ONDANSETRON 4 MILLIGRAM(S): 8 TABLET, FILM COATED ORAL at 06:00

## 2017-06-15 RX ADMIN — SODIUM CHLORIDE 3 MILLILITER(S): 9 INJECTION INTRAMUSCULAR; INTRAVENOUS; SUBCUTANEOUS at 03:16

## 2017-06-15 RX ADMIN — MORPHINE SULFATE 4 MILLIGRAM(S): 50 CAPSULE, EXTENDED RELEASE ORAL at 09:19

## 2017-06-15 RX ADMIN — Medication 3 MILLILITER(S): at 18:16

## 2017-06-15 RX ADMIN — ONDANSETRON 4 MILLIGRAM(S): 8 TABLET, FILM COATED ORAL at 03:23

## 2017-06-15 RX ADMIN — MORPHINE SULFATE 2 MILLIGRAM(S): 50 CAPSULE, EXTENDED RELEASE ORAL at 20:20

## 2017-06-15 RX ADMIN — MORPHINE SULFATE 2 MILLIGRAM(S): 50 CAPSULE, EXTENDED RELEASE ORAL at 20:02

## 2017-06-15 RX ADMIN — SODIUM CHLORIDE 60 MILLILITER(S): 9 INJECTION INTRAMUSCULAR; INTRAVENOUS; SUBCUTANEOUS at 13:54

## 2017-06-15 RX ADMIN — PIPERACILLIN AND TAZOBACTAM 25 GRAM(S): 4; .5 INJECTION, POWDER, LYOPHILIZED, FOR SOLUTION INTRAVENOUS at 22:46

## 2017-06-15 RX ADMIN — PIPERACILLIN AND TAZOBACTAM 200 GRAM(S): 4; .5 INJECTION, POWDER, LYOPHILIZED, FOR SOLUTION INTRAVENOUS at 13:54

## 2017-06-15 RX ADMIN — ONDANSETRON 4 MILLIGRAM(S): 8 TABLET, FILM COATED ORAL at 15:52

## 2017-06-15 RX ADMIN — PANTOPRAZOLE SODIUM 40 MILLIGRAM(S): 20 TABLET, DELAYED RELEASE ORAL at 13:54

## 2017-06-15 RX ADMIN — MORPHINE SULFATE 4 MILLIGRAM(S): 50 CAPSULE, EXTENDED RELEASE ORAL at 07:34

## 2017-06-15 RX ADMIN — SODIUM CHLORIDE 1000 MILLILITER(S): 9 INJECTION INTRAMUSCULAR; INTRAVENOUS; SUBCUTANEOUS at 03:16

## 2017-06-15 NOTE — ED ADULT NURSE REASSESSMENT NOTE - NS ED NURSE REASSESS COMMENT FT1
Comfort and safety rounding in place. Pt resting, family at bedside.
Received patient from primary RN. Pt alert and oriented , speech clear. Son at bedside. Conscious sedation procedure discussed by MD. Protocol initiated, pt placed on CM, all medical supplies at bedside. Vitals stable. Pt tolerated procedure well, no signs of respiratory distress. Pt reports no pain at this time. Comfort and safety rounding in place.

## 2017-06-15 NOTE — CONSULT NOTE ADULT - SUBJECTIVE AND OBJECTIVE BOX
HPI:  89yo Man with PMH and PSH as below, including a February 2017 visit for incarcerated right inguinal hernia which was reduced by the surgical team, is brought in by his sons due to concerns of another incarcerated right sided inguinal hernia.   Pt was seen by surgery and noted that they were unable to reduce hernia and discussed with sons about comfort care and a palliative care consult was called for next steps and PCU evaluation      PERTINENT PMH REVIEWED:  [x ] YES [ ] NO           SOCIAL HISTORY:   Significant other/partner:  [ ] YES  [x ] NO               Children:  [x ] YES  [ ] NO                   Religious/Spirituality:  Substance hx:  [ ] YES   [x ] NO                   Tobacco hx:  [ ] YES  [x ] NO                       Alcohol hx: [ ] YES  [x ] NO         Home Opioid hx:  [x ] YES  [ ] NO   Living Situation: [ x] Home  [ ] Long term care  [ ] Rehab [ ] Other    FAMILY HISTORY:  No pertinent family history in first degree relatives    [ x] Family history non-contributory     BASELINE (I)ADLs (prior to admission):  [place]: [ ] total  [ ] moderate [ ] dependent    ADVANCE DIRECTIVES:    DNR [x ] YES [ ] NO                            [ ] Completed  MOLST  [ ] YES [ x] NO                      [ ] Completed  Health Care Proxy [x ] YES  [ ] NO   [ ] Completed  Living Will  [x ] YES [ ] NO             [ ] Surrogate  [ x] HCP  [ ] Guardian:            see EMR                                                      Phone#:    Allergies    Percocet 5/325 (Unknown)    Intolerances    chocolate (Vomiting)      MEDICATIONS  (STANDING):  ALBUTerol/ipratropium for Nebulization 3milliLiter(s) Nebulizer every 6 hours  pantoprazole  Injectable 40milliGRAM(s) IV Push daily  levothyroxine Injectable 25MICROGram(s) IV Push daily  sodium chloride 0.9%. 1000milliLiter(s) IV Continuous <Continuous>  piperacillin/tazobactam IVPB. 3.375Gram(s) IV Intermittent every 8 hours    MEDICATIONS  (PRN):  LORazepam    IVPB 0.25milliGRAM(s) IV Intermittent four times a day PRN Anxiety  ondansetron Injectable 4milliGRAM(s) IV Push four times a day PRN Nausea and/or Vomiting  morphine  - Injectable 2milliGRAM(s) IV Push every 2 hours PRN Moderate Pain (4 - 6)      PRESENT SYMPTOMS:  Source: [x ] Patient   [x ] Family   [ ] Team     Pain:                        [ x] No [ ] Yes             [ ] Mild [ ] Moderate [ ] Severe    Onset -  Location -  Duration -  Character -  Alleviating/Aggravating -  Radiation -  Timing -      Dyspnea:                [x ] No [ ] Yes             [ ] Mild [ ] Moderate [ ] Severe    Anxiety:                  [x ] No [ ] Yes             [ ] Mild [ ] Moderate [ ] Severe    Fatigue:                  [ ] No [x ] Yes             [ ] Mild [x ] Moderate [ ] Severe    Nausea:                  [x ] No [ ] Yes             [ ] Mild [ ] Moderate [ ] Severe    Loss of appetite:   [ ] No [ x] Yes             [ ] Mild [x ] Moderate [ ] Severe    Constipation:        [ x] No [ ] Yes             [ ] Mild [ ] Moderate [ ] Severe    Other Symptoms:  [ ] All other review of systems negative   [ x] Unable to obtain due to poor mentation     Karnofsky Performance Score/Palliative Performance Status Version 2:      30   %    PHYSICAL EXAM:  Vital Signs Last 24 Hrs  T(C): 36.4, Max: 37.1 (06-15 @ 03:40)  T(F): 97.5, Max: 98.8 (06-15 @ 07:30)  HR: 62 (62 - 74)  BP: 153/82 (140/80 - 167/90)  BP(mean): --  RR: 20 (17 - 20)  SpO2: 97% (95% - 100%) I&O's Summary      General:  [x ] Alert  [x ] Oriented x 2     [ ] Lethargic  [ ] Agitated   [ ] Cachexia   [ ] Unarousable  [ ] Verbal  [ ] Non-Verbal    HEENT:  [x ] Normal   [ ] Dry mouth   [ ] ET Tube    [ ] Trach  [ ] Oral lesions    Lungs:   [x ] Clear [ ] Tachypnea  [ ] Audible excessive secretions   [ ] Rhonchi        [ ] Right [ ] Left [ ] Bilateral  [ ] Crackles        [ ] Right [ ] Left [ ] Bilateral  [ ] Wheezing     [ ] Right [ ] Left [ ] Bilateral    Cardiovascular:  [ x] Regular [ ] Irregular [ ] Tachycardia   [ ] Bradycardia  [ ] Murmur [ ] Other    Abdomen: [x ] Soft  [ x] Distended   [ ] +BS  [x ] Non tender [ ] Tender  [ ]PEG   [ ]OGT/ NGT   Last BM:       Genitourinary: [ ] Normal [x ] Incontinent   [ ] Oliguria/Anuria   [ ] Damico + large rt sided inguinal hernia extending into rt scrotum + erythema + warmth    Musculoskeletal:  [ x] Normal   [ ] Weakness  [ ] Bedbound/Wheelchair bound [ ] Edema    Neurological: [x ] No focal deficits  [ ] Cognitive impairment  [ ] Dysphagia [ ] Dysarthria [ ] Paresis [ ] Other     Skin: [x ] Normal   [ ] Pressure ulcer(s)                  [ ] Rash    LABS:                        12.6   16.5  )-----------( 346      ( 15 Haris 2017 03:23 )             41.2     06-15    137  |  95<L>  |  27<H>  ----------------------------<  131<H>  4.6   |  23  |  1.32<H>    Ca    10.7<H>      15 Haris 2017 03:22    TPro  8.3  /  Alb  4.7  /  TBili  0.3  /  DBili  x   /  AST  24  /  ALT  14  /  AlkPhos  100  06-15    PT/INR - ( 15 Haris 2017 03:22 )   PT: 11.1 sec;   INR: 1.02 ratio         PTT - ( 15 Haris 2017 03:22 )  PTT:28.6 sec      Shock: [ ] Septic [ ] Cardiogenic [ ] Neurologic [ ] Hypovolemic  Vasopressors x   Inotrophs x     Protein Calorie Malnutrition: [ ] Mild [ x] Moderate [ ] Severe    Oral Intake: [ ] Unable/mouth care only [ x] Minimal [ ] Moderate [ ] Full Capability  Diet: [x ] NPO [ ] Tube feeds [ ] TPN [ ] Other     RADIOLOGY & ADDITIONAL STUDIES:  BOWEL/PERITONEUM/ABDOMINAL WALL: Large right inguinal hernia incompletely   imaged. There are dilated loops of small bowel within the abdomen as well   as the hernia sac with 2 points of transition at the neck of the sac,   similar in appearance to prior study. There is fluid in the hernia sac.   Sac also contains unobstructed colon. Diverticulosis coli noted. No   pneumatosis or bowel wall thickening.  VESSELS: Atherosclerotic calcifications.  RETROPERITONEUM: No lymphadenopathy.    BONES: Degenerative changesof the spine.    IMPRESSION: High-grade small bowel obstruction with closed loop   configuration at the level of a large right inguinal hernia. Fluid in the   hernia sac could be sequela of bowel ischemia and lactate level   correlation is recommended.  REFERRALS:   [x ] Chaplaincy  [x ] Hospice  [ ] Child Life  [ x] Social Work  [ ] Case management [ ] Holistic Therapy

## 2017-06-15 NOTE — CONSULT NOTE ADULT - PROBLEM SELECTOR RECOMMENDATION 9
due to incarcerated hernia into pt rt scrotum  agree with conservative approach; not a surgical candidate  start IV abx and IVF; d/w sons possibility of comfort feeds ect  surgery re eval for ? if we can eventually reduce hernia

## 2017-06-15 NOTE — H&P ADULT - HISTORY OF PRESENT ILLNESS
89yo Man with PMH and PSH as below, including a February 2017 visit for incarcerated right inguinal hernia which was reduced by the surgical team, is brought in by his sons due to concerns of another incarcerated right sided inguinal hernia. The patient has dementia and is unable to provide much history - obtained from the Sons. The patient's last BM was 3 days ago, and it's not clear if he's been passing flatus since. He developed abdominal pain. The son notice the that hernia became very large in size and decided to come in for an evaluation. Vital signs in the ED were: T 97.5, HR 69, /84, RR 20, 95% RA. CT Abdomen showed a high grade SBO at the site of the known hernia. General surgery attempted to reduce the hernia at bedside, but were unsuccessful. The patient's children agreed to palliation.

## 2017-06-15 NOTE — ED PROVIDER NOTE - PHYSICAL EXAMINATION
follows simple commands, awake alert, NAD, NCAT, pupils equal and reactive, dry mucosal membranes, trachea midline, hr rate regular rhythm and rate, LLL rhonchi, ABD: Does not display evidenceo f pain with palpation of abdomen. Large umbilical hernia. EXT warm, No Edema, Distal Pulses Intact, No Rash,  MAEx4

## 2017-06-15 NOTE — ED ADULT NURSE NOTE - OBJECTIVE STATEMENT
Received patient awake and alert presents with abdominal pain x 9 hrs, +nausea/vomiting. PMH of large inguinal hernia, Patient verbalized he had a blockage in his intestines which was manually unblocked due to family refusing surgical intervention. Breathing unlabored with no distress noted at this time. All safety precaution maintained, will continue to monitor.

## 2017-06-15 NOTE — CONSULT NOTE ADULT - SUBJECTIVE AND OBJECTIVE BOX
89M with chronic right inguinal hernia, now causing closed-loop SBO.  Patient and family refusing surgical intervention at this time.  Bedside reduction attempted in ED with Attending, Dr. Latham, under conscious sedation with 4mg IV morphine.  Unable to reduce hernia.  Currently discussing possible surgical intervention with patient's sons.  Will Follow patient, currently admitted to medical service.  Full consult to follow.      Sarah Beth Medina MD PGY2  Acute Care Surgery, #[id number] ACUTE CARE SURGERY ([facility] - #[id number]) CONSULT NOTE  ---------------------------------------------------------------------------------------------     HPI: Patient is a 89y old  Male who presents with a chief complaint of Abdominal pain, nausea, emesis.  Patient has a chronic right inguinal hernia which he's had for years. He has had issues with obstruction from the hernia, most recently in February 2017.  Patient was admitted to the hospital on that admission, and his hernia was partially reduced at the bedside with IV morphine for sedation.  His SBO resolved, and was discharged home soon after.  Patient returns with similar symptoms.  Patient last tolerated PO intake yesterday, last BM was 3 days ago, unsure if passing flatus since then.  Patient accompanied by two sons, patient has a h/o dementia and is not completely oriented to situation.  Per patient's sons, they are against surgical intervention due to high risk of complications of surgical intervention.  Patient denies fevers/chills, denies lightheadedness/dizziness, denies SOB/chest pain.     ROS: 10-system review is otherwise negative except HPI above.      PAST MEDICAL & SURGICAL HISTORY:  Hypothyroidism  GERD (gastroesophageal reflux disease)  [place] (hard of hearing)  Dementia  Depression  PNA (pneumonia)  Disorder of rotator cuff: Left shoulder, not operable as per pt&#x27;s son  HTN (hypertension)  Hypercholesteremia  CAD (coronary artery disease)  Gout  S/P TURP  S/P hernia repair  S/P coronary artery by pass    FAMILY HISTORY:  No pertinent family history in first degree relatives    ALLERGIES: chocolate (Vomiting)  Percocet 5/325 (Unknown)    HOME MEDICATIONS: Exelon 4.6 mg/24 hr transdermal, guaiFENesin, sertraline, simvastatin, albuterol-ipratropium, pantoprazole, levothyroxine, Vitamin B-12, Restasis 0.05% ophthalmic, allopurinol, traZODone, metoclopramide, Econazole Nitrate    CURRENT MEDICATIONS None  --------------------------------------------------------------------------------------------    Vitals:   T(C): 37.1, Max: 37.1 (06-15 @ 03:40)  HR: 74 (69 - 74)  BP: 155/64 (148/79 - 167/90)  RR: 18 (18 - 20)  SpO2: 100% (95% - 100%)    Height (cm): 170.2 (06-15 @ 00:06)      ---------------------------------------------------------------------------------------------     PHYSICAL EXAM:   General: NAD, Lying in bed comfortably  Neuro: Alert  HEENT: NC/AT, EOMI  Cardio: RRR, nml S1/S2  Resp: Good effort, CTA b/l  GI/Abd: Soft, Mild diffuse tenderness, non-distended, right groin mass extending into scrotum, slight tenderness at right groin, mass with palpable bowel contents, not easily reducible, no overlying skin changes.   Vascular: All 4 extremities warm.  Skin: Intact, no breakdown  Musculoskeletal: All 4 extremities moving spontaneously, no limitations  --------------------------------------------------------------------------------------------    LABS  CBC (06-15 @ 03:23)                              12.6<L>                         16.5<H>  )----------------(  346        81.1<H>% Neutrophils, 11.5<L>% Lymphocytes, ANC: 13.4<H>                              41.2      BMP (06-15 @ 03:22)             137     |  95<L>   |  27<H> 		Ca++ --      Ca 10.7<H>             ---------------------------------( 131<H>		Mg --                 4.6     |  23      |  1.32<H>			Ph --        LFTs (06-15 @ 03:22)      TPro 8.3 / Alb 4.7 / TBili 0.3 / DBili -- / AST 24 / ALT 14 / AlkPhos 100    Coags (06-15 @ 03:22)  aPTT 28.6 / INR 1.02 / PT 11.1    VBG (06-15 @ 03:22)     7.47<H> / 37 / 17<L> / 27 / 3.6<H> / 22<L>%     Lactate: 2.5<H>    --------------------------------------------------------------------------------------------    IMAGING  CT abdomen/pelvis: High-grade small bowel obstruction with closed loop configuration at the level of a large right inguinal hernia. Fluid in the hernia sac could be sequela of bowel ischemia and lactate level correlation is recommended.    --------------------------------------------------------------------------------------------    ASSESSMENT: Patient is a 89y old m with large incarcerated right inguinal hernia causing a closed loop SBO    PLAN:   - Bedside reduction attempted with conscious sedation monitored by ED provider, 4mg IV morphine given.  Right inguinal hernia unable to be reduced, attempts made by myself as well as attending, Dr. Latham.    - Recommend OR for laparotomy, reduction and repair of inguinal hernia, with possible bowel resection.  Discussed at length by myself and Dr. Latham with the patients sons.  Surgical risks discussed, as well as expected clinical course without surgical intervention since reduction was unsuccessful.  Sons are currently still refusing surgery for the patient, they spoke to the patients PMD as well.  - Would recommend admission to medical service for symptom control  - Recommend palliative consult  - NGT may be placed if patient has continued emesis for comfort, but sons currently refusing NGT placement.  NGT placement would not treat bowel obstruction or incarcerated hernia.    - Patient seen/examined and plan discussed with Attending, Dr. Yeison Medina MD PGY2  Acute Care Surgery, #[id number]

## 2017-06-15 NOTE — H&P ADULT - PROBLEM SELECTOR PLAN 1
I had a 20 minute Face to Face discussion with the sons at bedside.  No surgery. Comfort care only. DNR/DNI. Keep NPO. No oral meds.   They are not against the use of antibiotics - start Zosyn IV to cover for gut cris (no evidence yet of GI perforation).  Zofran IV PRN nausea  Morphine 2mg IV q2h PRN pain  Palliative care aware - to see the patient. Will benefit for inpatient hospice.

## 2017-06-15 NOTE — H&P ADULT - PMH
CAD (coronary artery disease)    Dementia    Depression    Disorder of rotator cuff  Left shoulder, not operable as per pt's son  GERD (gastroesophageal reflux disease)    Gout    [place] (hard of hearing)    HTN (hypertension)    Hypercholesteremia    Hypothyroidism    PNA (pneumonia)

## 2017-06-15 NOTE — CONSULT NOTE ADULT - ASSESSMENT
89yo Man with PMH and PSH as below, including a February 2017 visit for incarcerated right inguinal hernia which was reduced by the surgical team, is brought in by his sons due to concerns of another incarcerated right sided inguinal hernia now unable to be reduced; CT positive for high grade bowel obstruction and family asking for honoring his wishes and comfort care

## 2017-06-15 NOTE — ED ADULT TRIAGE NOTE - CHIEF COMPLAINT QUOTE
abd pain x 9 hrs, vomiting x 3 hrs, hx hernia abd pain x 9 hrs, vomiting x 3 hrs, hx hernia, per son, pt "had blockage in his intestines" during last visit, "manually unblocked", family declined surgical intervention abd pain x 9 hrs, vomiting x 3 hrs, hx large inguinal hernia, per son, pt "had blockage in his intestines" during last visit, "manually unblocked", family declined surgical intervention

## 2017-06-15 NOTE — ED ADULT NURSE NOTE - PMH
CAD (coronary artery disease)    Dementia    Depression    Disorder of rotator cuff  Left shoulder, not operable as per pt's son  GERD (gastroesophageal reflux disease)    Gout    [place] (hard of hearing)    HTN (hypertension)    Hypercholesteremia    Hypothyroidism    PNA (pneumonia)

## 2017-06-15 NOTE — ED PROVIDER NOTE - PMH
CAD (coronary artery disease)    Dementia    Depression    Disorder of rotator cuff  Left shoulder, not operable as per pt's son  GERD (gastroesophageal reflux disease)    Gout    [place] (hard of hearing)    HTN (hypertension)    Hypercholesteremia    Hypothyroidism    PNA (pneumonia)

## 2017-06-15 NOTE — ED PROVIDER NOTE - OBJECTIVE STATEMENT
89M pmhx of closed loop bowel 2/2 hernia treated conservatively. Last BM Monday. Complaining of increasing abd pain tonight. 1 episode of nbnb emesis. 89M pmhx of closed loop bowel 2/2 hernia treated conservatively. Last BM Monday. Complaining of increasing abd pain tonight. 1 episode of nbnb emesis. Pt has had chronic constipation but is otherwise regular with bowel regimen. Last week had no bm for 5 days and had small stool with enema on Monday. no melena, hematochezia. did not take meds for pain. at baseline mental status for pt per sons at bedside. Last SBO treated with hernia reduction.

## 2017-06-15 NOTE — ED ADULT NURSE NOTE - CHIEF COMPLAINT QUOTE
abd pain x 9 hrs, vomiting x 3 hrs, hx hernia abd pain x 9 hrs, vomiting x 3 hrs, hx hernia, pt "had blockage in his intestines" during last visit, "manually unblocked", family declined surgical intervention abd pain x 9 hrs, vomiting x 3 hrs, hx large inguinal  hernia, pt "had blockage in his intestines" during last visit, "manually unblocked", family declined surgical intervention

## 2017-06-15 NOTE — H&P ADULT - NSHPLABSRESULTS_GEN_ALL_CORE
Labs reviewed: leukocytosis (neutrophil predominance) WBC 16.5, lactate mildly elevated on VBG 2.5    CT Abdomen personally reviewed: High-grade small bowel obstruction with closed loop configuration at the level of a large right inguinal hernia.

## 2017-06-15 NOTE — CONSULT NOTE ADULT - ATTENDING COMMENTS
Patient seen and examined with house staff.  89 year old with multiple medical problems (dementia, HTN, CAD, HLD, GERD, GOUT, hypothyroidism) presents with abdominal pain and vomiting. Exam and imaging consistent with incarcerated right inguinal hernia causing a small bowel obstruction.   Attempt at reduction of inguinal hernia done after 4mg morphine IV, this attempt failed to achieve a significant reduction.     Although he is awake and alert, due to dementia, I do not think patient is competent to make medical decisions.    I have discussed the current clinical situation with the patient's two sons at length.  I have quoted an approximately 50% chance of major morbidity or mortality for urgent operative laparotomy, lyses of adhesions, reduction and repair of hernia.  I have quoted an approximately 90% mortality for non-operative treatment of hernia.  At this time, sons do NOT want operative treatment.  They understand that this will likely lead to death.  They are asking for a palliative approach.  I have discussed with the sons that the surgical service remains available if they reverse their decision on operative treatment or want to continue discussions regarding surgery.  All questions answered.  Case discussed with EM team.

## 2017-06-15 NOTE — ED PROVIDER NOTE - ATTENDING CONTRIBUTION TO CARE
[age] yo male PMH closed loop bowel 2nd to hernia presents with generalized abdominal pain, vomiting; last bm 5 days ago; no fevers; on exam generalized abdominal tenderness; concern for obstruction; Plan: ct, labs, lactate, pain control, admission

## 2017-06-15 NOTE — ED PROVIDER NOTE - PROGRESS NOTE DETAILS
surgery consulted. Surgery unable to reduce hernia.  Brothers continue to decline surgery concerned for adverse effects.  Are aware of the consequences of bowel obstruction including pain, perforation and death, however at this time risks outweigh benefits in their opinion.  Would like palliative consult as inpatient.  Will admit to medicine as planned.  Lula Gómez, PGY2

## 2017-06-15 NOTE — CONSULT NOTE ADULT - PROBLEM SELECTOR RECOMMENDATION 5
Pt prognosis is poor and agree that due to pt age debility that a conservative approach is needed and we should focus on comfort care and QOL because pt is dying;  spoke with son that if pt clinically stabilizes we will have further discussions about inpt hospice transfer.

## 2017-06-16 DIAGNOSIS — K21.9 GASTRO-ESOPHAGEAL REFLUX DISEASE WITHOUT ESOPHAGITIS: ICD-10-CM

## 2017-06-16 DIAGNOSIS — R11.2 NAUSEA WITH VOMITING, UNSPECIFIED: ICD-10-CM

## 2017-06-16 DIAGNOSIS — R10.31 RIGHT LOWER QUADRANT PAIN: ICD-10-CM

## 2017-06-16 DIAGNOSIS — F05 DELIRIUM DUE TO KNOWN PHYSIOLOGICAL CONDITION: ICD-10-CM

## 2017-06-16 PROCEDURE — 99233 SBSQ HOSP IP/OBS HIGH 50: CPT | Mod: GC

## 2017-06-16 RX ORDER — MORPHINE SULFATE 50 MG/1
2 CAPSULE, EXTENDED RELEASE ORAL ONCE
Qty: 0 | Refills: 0 | Status: DISCONTINUED | OUTPATIENT
Start: 2017-06-16 | End: 2017-06-16

## 2017-06-16 RX ORDER — MORPHINE SULFATE 50 MG/1
4 CAPSULE, EXTENDED RELEASE ORAL
Qty: 0 | Refills: 0 | Status: DISCONTINUED | OUTPATIENT
Start: 2017-06-16 | End: 2017-06-17

## 2017-06-16 RX ORDER — MORPHINE SULFATE 50 MG/1
4 CAPSULE, EXTENDED RELEASE ORAL EVERY 8 HOURS
Qty: 0 | Refills: 0 | Status: DISCONTINUED | OUTPATIENT
Start: 2017-06-16 | End: 2017-06-17

## 2017-06-16 RX ORDER — FAMOTIDINE 10 MG/ML
20 INJECTION INTRAVENOUS ONCE
Qty: 0 | Refills: 0 | Status: COMPLETED | OUTPATIENT
Start: 2017-06-16 | End: 2017-06-16

## 2017-06-16 RX ADMIN — MORPHINE SULFATE 4 MILLIGRAM(S): 50 CAPSULE, EXTENDED RELEASE ORAL at 11:19

## 2017-06-16 RX ADMIN — PIPERACILLIN AND TAZOBACTAM 25 GRAM(S): 4; .5 INJECTION, POWDER, LYOPHILIZED, FOR SOLUTION INTRAVENOUS at 21:37

## 2017-06-16 RX ADMIN — PIPERACILLIN AND TAZOBACTAM 25 GRAM(S): 4; .5 INJECTION, POWDER, LYOPHILIZED, FOR SOLUTION INTRAVENOUS at 13:47

## 2017-06-16 RX ADMIN — Medication 2 MILLIGRAM(S): at 06:00

## 2017-06-16 RX ADMIN — MORPHINE SULFATE 2 MILLIGRAM(S): 50 CAPSULE, EXTENDED RELEASE ORAL at 03:52

## 2017-06-16 RX ADMIN — Medication 1 DROP(S): at 06:07

## 2017-06-16 RX ADMIN — MORPHINE SULFATE 4 MILLIGRAM(S): 50 CAPSULE, EXTENDED RELEASE ORAL at 23:55

## 2017-06-16 RX ADMIN — FAMOTIDINE 20 MILLIGRAM(S): 10 INJECTION INTRAVENOUS at 05:14

## 2017-06-16 RX ADMIN — MORPHINE SULFATE 4 MILLIGRAM(S): 50 CAPSULE, EXTENDED RELEASE ORAL at 11:04

## 2017-06-16 RX ADMIN — PANTOPRAZOLE SODIUM 40 MILLIGRAM(S): 20 TABLET, DELAYED RELEASE ORAL at 18:02

## 2017-06-16 RX ADMIN — MORPHINE SULFATE 2 MILLIGRAM(S): 50 CAPSULE, EXTENDED RELEASE ORAL at 04:45

## 2017-06-16 RX ADMIN — MORPHINE SULFATE 4 MILLIGRAM(S): 50 CAPSULE, EXTENDED RELEASE ORAL at 23:43

## 2017-06-16 RX ADMIN — PANTOPRAZOLE SODIUM 40 MILLIGRAM(S): 20 TABLET, DELAYED RELEASE ORAL at 06:00

## 2017-06-16 RX ADMIN — HALOPERIDOL DECANOATE 0.5 MILLIGRAM(S): 100 INJECTION INTRAMUSCULAR at 03:29

## 2017-06-16 RX ADMIN — Medication 1 DROP(S): at 13:48

## 2017-06-16 RX ADMIN — MORPHINE SULFATE 2 MILLIGRAM(S): 50 CAPSULE, EXTENDED RELEASE ORAL at 05:49

## 2017-06-16 RX ADMIN — Medication 25 MICROGRAM(S): at 06:07

## 2017-06-16 RX ADMIN — Medication 1 DROP(S): at 21:37

## 2017-06-16 RX ADMIN — Medication 3 MILLILITER(S): at 18:02

## 2017-06-16 RX ADMIN — MORPHINE SULFATE 2 MILLIGRAM(S): 50 CAPSULE, EXTENDED RELEASE ORAL at 03:37

## 2017-06-16 RX ADMIN — PIPERACILLIN AND TAZOBACTAM 25 GRAM(S): 4; .5 INJECTION, POWDER, LYOPHILIZED, FOR SOLUTION INTRAVENOUS at 06:07

## 2017-06-16 RX ADMIN — SODIUM CHLORIDE 60 MILLILITER(S): 9 INJECTION INTRAMUSCULAR; INTRAVENOUS; SUBCUTANEOUS at 19:36

## 2017-06-16 RX ADMIN — MORPHINE SULFATE 2 MILLIGRAM(S): 50 CAPSULE, EXTENDED RELEASE ORAL at 05:14

## 2017-06-16 RX ADMIN — MORPHINE SULFATE 2 MILLIGRAM(S): 50 CAPSULE, EXTENDED RELEASE ORAL at 04:34

## 2017-06-16 NOTE — PROGRESS NOTE ADULT - ASSESSMENT
89 M with gastritis/gastric ulcer, gait instability s/p multiple falls, mild vascular dementia, hypothyroidism, urinary retention s/p TURP, HTN, HLD, CAD s/p CABG, depression, hearing and visual deficits, and prior history of right inguinal hernia with bowel incarceration s/p February 2017 manual reduction presents with recurrence of right bowel obstruction s/p unsuccessful reduction.

## 2017-06-16 NOTE — PROGRESS NOTE ADULT - PROBLEM SELECTOR PLAN 1
-- Secondary to bowel obstruction into right inguinal canal - recurrent and not a surgical candidate.  -- NPO x ice  -- No NGT at this time  -- Continue Zofran 0.4 mg IVP Q8 PRN  -- Continue Haldol 0.5mg IVP Q6 PRN  -- Continue Decadron 2mg IVP daily

## 2017-06-16 NOTE — PROGRESS NOTE ADULT - SUBJECTIVE AND OBJECTIVE BOX
Geriatric and Palliative Care Progress Note [x ]   Hospice Progress Note [ ]     HPI:  89 M with gastritis/gastric ulcer, gait instability s/p multiple falls, mild vascular dementia, hypothyroidism, urinary retention s/p TURP, HTN, HLD, CAD s/p CABG, depression, hearing and visual deficits, and prior history of right inguinal hernia with bowel incarceration s/p February 2017 manual reduction presents with intractable abdominal pain with N/V secondary to recurrence of right inguinal hernia with incarceration. Surgery attempted to manually reduce but was unsuccessful, and felt that surgery was too high risk to undertake. Two sons agreed to supportive care in Palliative Care Suite, with hopes that bowel obstruction would spontaneous reverse.     OVERNIGHT: Patient suffered some delirium and abdominal pain overnight requiring 1 dose of Morphine 2mg and 1 doses of Haldol 0.5mg IVP. Son stayed overnight and remains at bedside. Patient had small amount of emesis (coffee-ground per RN) last evening with nausea that resolved with one dose of zofran 4mg IVP.    ADVANCE DIRECTIVES:    DNR                     [x ] YES [ ] NO                      [ ] Completed  MOLST                 [  ] YES [ x] NO                      [ ] Completed  Health Care Proxy [x ] YES  [ ] NO                      [ ] Completed  Living Will             [x ] YES [ ] NO             [ ] Surrogate  [ x] HCP  [ ] Guardian:            See EMR                                                     Phone#:     Allergies    Percocet 5/325 (delirium)    Intolerances    chocolate (Vomiting)      MEDICATIONS  (STANDING):  levothyroxine Injectable 25MICROGram(s) IV Push daily  sodium chloride 0.9%. 1000milliLiter(s) IV Continuous <Continuous>  piperacillin/tazobactam IVPB. 3.375Gram(s) IV Intermittent every 8 hours  pantoprazole  Injectable 40milliGRAM(s) IV Push every 12 hours  artificial  tears Solution 1Drop(s) Both EYES three times a day  dexamethasone  Injectable 2milliGRAM(s) IV Push daily  ALBUTerol/ipratropium for Nebulization 3milliLiter(s) Nebulizer <User Schedule>  morphine  - Injectable 4milliGRAM(s) IV Push every 8 hours    MEDICATIONS  (PRN):  LORazepam    IVPB 0.25milliGRAM(s) IV Intermittent four times a day PRN Anxiety  ondansetron Injectable 4milliGRAM(s) IV Push four times a day PRN Nausea and/or Vomiting  haloperidol    Injectable 0.5milliGRAM(s) IV Push every 4 hours PRN Nausea  haloperidol    Injectable 0.5milliGRAM(s) IV Push two times a day PRN agitation  morphine  - Injectable 4milliGRAM(s) IV Push every 1 hour PRN Severe Pain (7 - 10)      PRESENT SYMPTOMS:  Source: [x ] Patient   [ x] Family   [x ] Team     Pain:                        [ ] No [x ] Yes             [ ] Mild [ x] Moderate [ ] Severe    Onset -                       Prior to admission  Location -                    epigastric abdomen and right lower quadrant  Duration -                      constant  Character -                  twisting, pulling, achy  Alleviating/Aggravating -  improved with morphine  Radiation -                     to back  Timing -                    random      Dyspnea:                [ x] No [ ] Yes             [ ] Mild [ ] Moderate [ ] Severe    Anxiety:                  [ ] No [x ] Yes             [x ] Mild [ ] Moderate [ ] Severe    Fatigue:                  [ ] No [x ] Yes             [ ] Mild [x ] Moderate [ ] Severe    Nausea:                  [ ] No [x ] Yes             [x ] Mild [ ] Moderate [ ] Severe    Loss of appetite:   [ ] No [x ] Yes             [ ] Mild [ x] Moderate [ ] Severe    Constipation:        [ ] No [x ] Yes             [ ] Mild [x ] Moderate [ ] Severe    Other Symptoms:  [x ] All other review of systems negative   [ ] Unable to obtain due to poor mentation     Karnofsky Performance Score/Palliative Performance Status Version 2: 20        %    PHYSICAL EXAM:    Vital Signs Last 24 Hrs  T(C): 36.4, Max: 36.4 (06-16 @ 08:45)  T(F): 97.5, Max: 97.5 (06-16 @ 08:45)  HR: 65 (65 - 70)  BP: 129/63 (129/63 - 168/93)  BP(mean): --  RR: 18 (18 - 18)  SpO2: 95% (95% - 97%) I&O's Summary      General:  [x ] Alert        [x ] Oriented x 1     [ ] Lethargic  [ x] Somnolent   [ ] Agitated     [ ] Cachexia   [ ] Unarousable  [x ] Verbal       [ ] Non-Verbal    HEENT:  [ ] Normal   [x ] Dry mouth   [ ] ET Tube    [ ] Trach  [ ] Oral lesions    Lungs:   [x ] Clear                         [ ] Tachypnea  [ ] Audible excessive secretions   [ ] Rhonchi                   [ ] Right             [ ] Left                                             [ ] Bilateral  [ ] Crackles                   [ ] Right             [ ] Left                                             [ ] Bilateral  [ ] Wheezing                [ ] Right             [ ] Left                                             [ ] Bilateral  [ ] Respiratory failure [ ] Acute           [ ] Chronic          [ ] Hypoxemic    [ ] Hypercarbic            [ ] Other    Cardiovascular:   [x ] Regular [ ] Irregular [ ] Tachycardia   [ ] Bradycardia  [ x] Murmur [ ] Other  [ ] Shock [ ] Septic [ ] Hypovolemic [ ] Neurogenic [ ] Cardiogenic   [ ] Vasopressors [ ] Inotrophs    Abdomen:   [ x] Soft  [ ] Distended   [x ] +BS Hyperactive    [ ] Non tender [x ] Tender  [ ] Other  [ ] PEG   [ ]OGT/ NGT   [ ] Other          Last BM:   6/12/17    Genitourinary:  [ ] Normal [ ] Incontinent   [ ] Oliguria/Anuria   [ ] Damico  [ ] Other         Musculoskeletal:    [ ] Normal                                         [x ] Generalized Weakness     [ ] Edema  [ ] Bedbound/Wheelchair bound [x ]  Ambulatory [x ] with [ ] without assistance   [ ] Functional quadriplegia    Neurological:   [ ] No focal deficits       [x ] Cognitive impairment  [ ] Dysphagia [ ] Dysarthria [ ] Paresis [ ] Other   [ ] Brain compression  [ ] Cerebral edema            [ ] Encephalopathy    Skin:   [ ] Normal  [ x] Skin tears - healing  [ x] resolving cellulitis right elbow/bursitis s/p aspiration last week   [ ] Rash     [ ] Ulcer(s) type [ ] Diabetic [ ] Pressure [ ] Other   Stage        Present on Admission: [ ]  Yes [ ]  No      LABS:                        12.6   16.5  )-----------( 346      ( 15 Haris 2017 03:23 )             41.2     06-15    137  |  95<L>  |  27<H>  ----------------------------<  131<H>  4.6   |  23  |  1.32<H>    Ca    10.7<H>      15 Haris 2017 03:22    TPro  8.3  /  Alb  4.7  /  TBili  0.3  /  DBili  x   /  AST  24  /  ALT  14  /  AlkPhos  100  06-15    PT/INR - ( 15 Haris 2017 03:22 )   PT: 11.1 sec;   INR: 1.02 ratio         PTT - ( 15 Haris 2017 03:22 )  PTT:28.6 sec      Protein Calorie Malnutrition: [ ] Mild [ ] Moderate [ ] Severe      Oral Intake:   [x ] Unable/mouth care only [ ] Minimal [ ] Moderate [ ] Full Capability    Diet:   [x ] NPO except ice  [ ] Tube feeds   [ ] TPN   [ ] Other     REFERRALS:     [ ] Chaplaincy               [ ] Hospice Care                  [ ] Child Life                    [ ] Social Work    [ ] Case management [ ] Nutrition                         [ ] Holistic Therapy        [ ] Wound Care   [ ]Physical Therapy     [ ] Occupational Therapy  [ ] Speech Language Pathology  [ ] Other   [x ]See goals of care note in [place]    RADIOLOGY & ADDITIONAL STUDIES:    EXAM:  CT ABDOMEN AND PELVIS OC IC       PROCEDURE DATE:  06/15/2017      INTERPRETATION:  CLINICAL INFORMATION: Abdominal pain and vomiting    COMPARISON: CT abdomen and pelvis 2/14/2017    PROCEDURE:   CT of the Abdomenand Pelvis was performed with intravenous contrast.   Intravenous contrast: 90 ml Omnipaque 350. 10 ml discarded.  Oral contrast: positive contrast was administered.  Sagittal and coronal reformats were performed.    FINDINGS:    LOWER CHEST: Sternotomy wires and mediastinal clips from prior CABG   noted. Moderate hiatal hernia. Oral contrast in the distal esophagus;   recommend aspiration precautions.    LIVER: Within normal limits.  BILE DUCTS: Normal caliber.  GALLBLADDER: Cholelithiasis.  SPLEEN: Within normal limits.  PANCREAS: Within normal limits.  ADRENALS: Within normal limits.  KIDNEYS/URETERS: Left upper pole renal cyst.    BLADDER: Within normal limits.  REPRODUCTIVE ORGANS: The prostate is within normal limits.     BOWEL/PERITONEUM/ABDOMINAL WALL: Large right inguinal hernia incompletely   imaged. There are dilated loops of small bowel within the abdomen as well   as the hernia sac with 2 points of transition at the neck of the sac,   similar in appearance to prior study. There is fluid in the hernia sac.   Sac also contains unobstructed colon. Diverticulosis coli noted. No   pneumatosis or bowel wall thickening.  VESSELS: Atherosclerotic calcifications.  RETROPERITONEUM: No lymphadenopathy.    BONES: Degenerative changesof the spine.    IMPRESSION: High-grade small bowel obstruction with closed loop   configuration at the level of a large right inguinal hernia. Fluid in the   hernia sac could be sequela of bowel ischemia and lactate level   correlation is recommended.    Findings discussed with Dr. Robertson by Dr. Chi on 6/15/2017 at   5:00 AM  with read back.      KATYA CHI M.D., ATTENDING RADIOLOGIST  This document has been electronically signed. Haris 15 2017  5:03AM

## 2017-06-16 NOTE — PROGRESS NOTE ADULT - PROBLEM SELECTOR PLAN 5
Emotional support for sons at bedside.  Reassurance that we will provide mindful observation of symptoms in hopes that it will reverse its course. Otherwise intend to provide end of life care in PCU.

## 2017-06-16 NOTE — PROGRESS NOTE ADULT - ASSESSMENT
ASSESSMENT  89y male with chronically incarcerated right inguinal hernia now causing a closed loop SBO, unable to be reduced at bedside with conscious sedation in ED yesterday    PLAN  - Diet: NPO  - Pain control with IV medications.    - c/w care per palliative  - No further surgical intervention, patient and family declining relatively high-risk operation to repair hernia, and no further attempts at bedside reduction to be made as failed with conscious sedation.  Additionally, reduction of hernia at this point may cause compromised bowel to be reduced into the abdomen and cause earlier decompensation from sepsis secondary to peritonitis.  This may also lead to limited respiratory efforts from return of extraabdominal contents into abdominal cavity  - Discussed with attending, Dr. Rivera.      Sarah Beth Medina MD PGY2  Acute Care Surgery, #[id number]

## 2017-06-16 NOTE — PROGRESS NOTE ADULT - PROBLEM SELECTOR PLAN 3
Some baseline secondary to mild-moderate dementia.  Reassurance  Re-establish day/night ritual.  Continue Haldol 0.5mg IVP Q6 PRN

## 2017-06-16 NOTE — PROGRESS NOTE ADULT - SUBJECTIVE AND OBJECTIVE BOX
ACUTE CARE SURGERY ([facility] - #[id number]) PROGRESS NOTE  ---------------------------------------------------------------------------------------------     INTERVAL EVENTS: Patient admitted to the palliative care unit after failed attempt at reduction of hernia under conscious sedation in the ED.  Patient with intermittent complaints of pain, received IV morphine, didn't sleep well through the night but now currently comfortable. Denies nausea/vomiting, denies flatus or BM.    VITALS  T(C): 36.4, Max: 36.9 (06-15 @ 12:05)  HR: 65 (62 - 71)  BP: 129/63 (129/63 - 168/93)  RR: 18 (17 - 20)  SpO2: 95% (95% - 100%)    PHYSICAL EXAM:   General: NAD, Lying in bed comfortably  GI/Abd: Soft, NT/ND, no rebound/guarding, no masses palpated.  Right groin with large mass into right scrotum, currently non-tender, no overlying skin changes.

## 2017-06-17 PROCEDURE — 99233 SBSQ HOSP IP/OBS HIGH 50: CPT | Mod: GC

## 2017-06-17 RX ORDER — DEXAMETHASONE 0.5 MG/5ML
2 ELIXIR ORAL ONCE
Qty: 0 | Refills: 0 | Status: COMPLETED | OUTPATIENT
Start: 2017-06-17 | End: 2017-06-17

## 2017-06-17 RX ORDER — DEXAMETHASONE 0.5 MG/5ML
4 ELIXIR ORAL DAILY
Qty: 0 | Refills: 0 | Status: DISCONTINUED | OUTPATIENT
Start: 2017-06-18 | End: 2017-06-19

## 2017-06-17 RX ORDER — SODIUM CHLORIDE 9 MG/ML
1000 INJECTION, SOLUTION INTRAVENOUS
Qty: 0 | Refills: 0 | Status: DISCONTINUED | OUTPATIENT
Start: 2017-06-17 | End: 2017-06-19

## 2017-06-17 RX ORDER — MORPHINE SULFATE 50 MG/1
4 CAPSULE, EXTENDED RELEASE ORAL
Qty: 0 | Refills: 0 | Status: DISCONTINUED | OUTPATIENT
Start: 2017-06-17 | End: 2017-06-20

## 2017-06-17 RX ADMIN — MORPHINE SULFATE 4 MILLIGRAM(S): 50 CAPSULE, EXTENDED RELEASE ORAL at 15:15

## 2017-06-17 RX ADMIN — MORPHINE SULFATE 4 MILLIGRAM(S): 50 CAPSULE, EXTENDED RELEASE ORAL at 15:30

## 2017-06-17 RX ADMIN — Medication 25 MICROGRAM(S): at 06:05

## 2017-06-17 RX ADMIN — Medication 3 MILLILITER(S): at 08:03

## 2017-06-17 RX ADMIN — MORPHINE SULFATE 4 MILLIGRAM(S): 50 CAPSULE, EXTENDED RELEASE ORAL at 14:25

## 2017-06-17 RX ADMIN — SODIUM CHLORIDE 60 MILLILITER(S): 9 INJECTION INTRAMUSCULAR; INTRAVENOUS; SUBCUTANEOUS at 08:04

## 2017-06-17 RX ADMIN — MORPHINE SULFATE 4 MILLIGRAM(S): 50 CAPSULE, EXTENDED RELEASE ORAL at 14:09

## 2017-06-17 RX ADMIN — PANTOPRAZOLE SODIUM 40 MILLIGRAM(S): 20 TABLET, DELAYED RELEASE ORAL at 06:05

## 2017-06-17 RX ADMIN — Medication 2 MILLIGRAM(S): at 14:01

## 2017-06-17 RX ADMIN — SODIUM CHLORIDE 40 MILLILITER(S): 9 INJECTION, SOLUTION INTRAVENOUS at 14:12

## 2017-06-17 RX ADMIN — Medication 2 MILLIGRAM(S): at 06:05

## 2017-06-17 RX ADMIN — Medication 3 MILLILITER(S): at 18:29

## 2017-06-17 RX ADMIN — Medication 1 DROP(S): at 06:04

## 2017-06-17 RX ADMIN — Medication 1 DROP(S): at 22:24

## 2017-06-17 RX ADMIN — Medication 1 DROP(S): at 14:12

## 2017-06-17 RX ADMIN — PIPERACILLIN AND TAZOBACTAM 25 GRAM(S): 4; .5 INJECTION, POWDER, LYOPHILIZED, FOR SOLUTION INTRAVENOUS at 14:11

## 2017-06-17 RX ADMIN — PIPERACILLIN AND TAZOBACTAM 25 GRAM(S): 4; .5 INJECTION, POWDER, LYOPHILIZED, FOR SOLUTION INTRAVENOUS at 06:04

## 2017-06-17 RX ADMIN — PANTOPRAZOLE SODIUM 40 MILLIGRAM(S): 20 TABLET, DELAYED RELEASE ORAL at 18:27

## 2017-06-17 RX ADMIN — PIPERACILLIN AND TAZOBACTAM 25 GRAM(S): 4; .5 INJECTION, POWDER, LYOPHILIZED, FOR SOLUTION INTRAVENOUS at 22:22

## 2017-06-17 NOTE — PROGRESS NOTE ADULT - PROBLEM SELECTOR PLAN 4
Secondary to bowel obstruction and inguinal hernia.  Zosyn 3.375mg IVPB Q8 for prophylactic treatment for bacterial translocation  Continue Morphine 4mg IVP Q1 PRN. Secondary to bowel obstruction and inguinal hernia.  Zosyn 3.375mg IVPB Q8 for prophylactic treatment for bacterial translocation  D/C scheduled Morphine Q8.  Continue Morphine 4mg IVP Q1 PRN. Secondary to bowel obstruction and inguinal hernia.  Increase decadron to 4mg IVP Daily  Zosyn 3.375mg IVPB Q8 for prophylactic treatment for bacterial translocation  D/C scheduled Morphine Q8.  Continue Morphine 4mg IVP Q1 PRN.

## 2017-06-17 NOTE — PROGRESS NOTE ADULT - SUBJECTIVE AND OBJECTIVE BOX
Geriatric and Palliative Care Progress Note [ ]   Hospice Progress Note [ ]     HPI:  89 M with gastritis/gastric ulcer, gait instability s/p multiple falls, mild vascular dementia, hypothyroidism, urinary retention s/p TURP, HTN, HLD, CAD s/p CABG, depression, hearing and visual deficits, and prior history of right inguinal hernia with bowel incarceration s/p February 2017 manual reduction presents with intractable abdominal pain with N/V secondary to recurrence of right inguinal hernia with incarceration. Surgery attempted to manually reduce but was unsuccessful, and felt that surgery was too high risk to undertake. Two sons agreed to supportive care in Palliative Care Suite, with hopes that bowel obstruction would spontaneous reverse. Patient suffered HS delirium, well-controlled with ativan, and mild nausea, but overall described having pain well-managed with morphine.     OVERNIGHT: Mild delirium around midnight, otherwise patient denies pain, nausea, flatulence, belching or has any questions or concerns. Sons alternate staying overnight, and remain at bedside.    ADVANCE DIRECTIVES:    DNR                     [x ] YES [ ] NO                      [ ] Completed  MOLST                 [  ] YES [ x] NO                      [ ] Completed  Health Care Proxy [x ] YES  [ ] NO                      [ ] Completed  Living Will             [x ] YES [ ] NO             [ ] Surrogate  [ x] HCP  [ ] Guardian:     2  martínez                                                     Phone#:     Allergies    Percocet 5/325 (Unknown)    Intolerances    chocolate (Vomiting)      MEDICATIONS  (STANDING):  levothyroxine Injectable 25MICROGram(s) IV Push daily  sodium chloride 0.9%. 1000milliLiter(s) IV Continuous <Continuous>  piperacillin/tazobactam IVPB. 3.375Gram(s) IV Intermittent every 8 hours  pantoprazole  Injectable 40milliGRAM(s) IV Push every 12 hours  artificial  tears Solution 1Drop(s) Both EYES three times a day  dexamethasone  Injectable 2milliGRAM(s) IV Push daily  ALBUTerol/ipratropium for Nebulization 3milliLiter(s) Nebulizer <User Schedule>  morphine  - Injectable 4milliGRAM(s) IV Push every 8 hours    MEDICATIONS  (PRN):  LORazepam    IVPB 0.25milliGRAM(s) IV Intermittent four times a day PRN Anxiety  ondansetron Injectable 4milliGRAM(s) IV Push four times a day PRN Nausea and/or Vomiting  haloperidol    Injectable 0.5milliGRAM(s) IV Push every 4 hours PRN Nausea  haloperidol    Injectable 0.5milliGRAM(s) IV Push two times a day PRN agitation  morphine  - Injectable 4milliGRAM(s) IV Push every 1 hour PRN Severe Pain (7 - 10)      PRESENT SYMPTOMS:  Source: [ x] Patient   [x ] Family   [x ] Team     Pain:                        [x ] No [ ] Yes             [ ] Mild [ ] Moderate [ ] Severe    Onset -  Location -  Duration -  Character -  Alleviating/Aggravating -  Radiation -  Timing -      Dyspnea:                [ x] No [ ] Yes             [ ] Mild [ ] Moderate [ ] Severe    Anxiety:                  [ ] No [x ] Yes             [x ] Mild [ ] Moderate [ ] Severe    Fatigue:                  [ ] No [x ] Yes             [ ] Mild [ x] Moderate [ ] Severe    Nausea:                  [x ] No [ ] Yes             [ ] Mild [ ] Moderate [ ] Severe    Loss of appetite:   [ ] No [x ] Yes             [ ] Mild [ ] Moderate [x ] Severe    Constipation:        [ ] No [x ] Yes             [ ] Mild [ ] Moderate [x ] Severe    Other Symptoms:  [ ] All other review of systems negative   [ ] Unable to obtain due to poor mentation     Karnofsky Performance Score/Palliative Performance Status Version 2:         %    PHYSICAL EXAM:    Vital Signs Last 24 Hrs  T(C): 36.3, Max: 36.8 (06-16 @ 20:07)  T(F): 97.4, Max: 98.3 (06-16 @ 20:07)  HR: 77 (65 - 77)  BP: 112/85 (112/85 - 128/79)  BP(mean): --  RR: 18 (18 - 18)  SpO2: 93% (93% - 93%) I&O's Summary    I & Os for current day (as of 17 Jun 2017 11:30)  =============================================  IN: 920 ml / OUT: 800 ml / NET: 120 ml      General:  [x ] Alert          [x ] Oriented x  1    [ ] Lethargic  [ ] Somnolent   [ ] Agitated     [ ] Cachexia   [ ] Unarousable  [x ] Verbal       [ ] Non-Verbal    HEENT:  [x ] Normal   [ ] Dry mouth   [ ] ET Tube    [ ] Trach  [ ] Oral lesions    Lungs:   [x ] Clear                         [ ] Tachypnea  [ ] Audible excessive secretions   [ ] Rhonchi                   [ ] Right             [ ] Left                                             [ ] Bilateral  [ ] Crackles                   [ ] Right             [ ] Left                                             [ ] Bilateral  [ ] Wheezing                [ ] Right             [ ] Left                                             [ ] Bilateral  [ ] Respiratory failure [ ] Acute           [ ] Chronic          [ ] Hypoxemic    [ ] Hypercarbic            [ ] Other    Cardiovascular:   [x ] Regular [ ] Irregular [ ] Tachycardia   [ ] Bradycardia  [ ] Murmur [ ] Other  [ ] Shock [ ] Septic [ ] Hypovolemic [ ] Neurogenic [ ] Cardiogenic   [ ] Vasopressors [ ] Inotrophs    Abdomen:   [x ] Soft  [x ] Mildly Distended   [x ] +BS Hyperactive    [ x] Non tender [ ] Tender  [ ] Other  [ ]PEG   [ ]OGT/ NGT   [ ] Other          Last BM:   6/12/17    Genitourinary:  [ ] Normal [ ] Incontinent   [ ] Oliguria/Anuria   [x ] Damico  [ x] Other -right inguinal hernia enlarged with incarcerated bowel with tense skin mildly tender        Musculoskeletal:    [ ] Normal                                         [x ] Generalized Weakness     [ ] Edema  [x ] Bedbound [ ]  Ambulatory [ ] with [ ] without assistance   [ ] Functional quadriplegia    Neurological:   [ ] No focal deficits       [ x] Cognitive impairment  [ ] Dysphagia [ ] Dysarthria [ ] Paresis [ ] Other   [ ] Brain compression  [ ] Cerebral edema            [ ] Encephalopathy    Skin:   [x ] Normal - no skin issue about perineum or scrotum  [ ] Rash     [ ] Ulcer(s) type [ ] Diabetic [ ] Pressure [ ] Other   Stage        Present on Admission: [ ]  Yes [ ]  No      LABS:    None today    Protein Calorie Malnutrition: [ ] Mild [ ] Moderate [ ] Severe      Oral Intake:   [x ] Unable/mouth care only [ ] Minimal [ ] Moderate [ ] Full Capability    Diet:   [x ] NPO   [ ] Tube feeds   [ ] TPN   [ ] Other     REFERRALS:     [ ] Chaplaincy               [ ] Hospice Care                  [ ] Child Life                    [ ] Social Work    [ ] Case management [ ] Nutrition                         [ ] Holistic Therapy        [ ] Wound Care   [ ]Physical Therapy     [ ] Occupational Therapy  [ ]] Speech Language Pathology  [ ] Other   [x ]See goals of care note in [place]    RADIOLOGY & ADDITIONAL STUDIES:    None today Geriatric and Palliative Care Progress Note [ x]   Hospice Progress Note [ ]     HPI:  89 M with gastritis/gastric ulcer, gait instability s/p multiple falls, mild vascular dementia, hypothyroidism, urinary retention s/p TURP, HTN, HLD, CAD s/p CABG, depression, hearing and visual deficits, and prior history of right inguinal hernia with bowel incarceration s/p February 2017 manual reduction presents with intractable abdominal pain with N/V secondary to recurrence of right inguinal hernia with incarceration. Surgery attempted to manually reduce but was unsuccessful, and felt that surgery was too high risk to undertake. Two sons agreed to supportive care in Palliative Care Suite, with hopes that bowel obstruction would spontaneous reverse. Patient suffered HS delirium, well-controlled with ativan, and mild nausea, but overall described having pain well-managed with morphine.     OVERNIGHT: Mild delirium around midnight, otherwise patient denies pain, nausea, flatulence, belching or has any questions or concerns. Requesting NOT to have scheduled morphine. Sons alternate staying overnight, and remain at bedside.    ADVANCE DIRECTIVES:    DNR                     [x ] YES [ ] NO                      [ ] Completed  MOLST                 [  ] YES [ x] NO                      [ ] Completed  Health Care Proxy [x ] YES  [ ] NO                      [ ] Completed  Living Will             [x ] YES [ ] NO             [ ] Surrogate  [ x] HCP  [ ] Guardian:     2  martínez                                                     Phone#:     Allergies    Percocet 5/325 (Unknown)    Intolerances    chocolate (Vomiting)      MEDICATIONS  (STANDING):  levothyroxine Injectable 25MICROGram(s) IV Push daily  sodium chloride 0.9%. 1000milliLiter(s) IV Continuous <Continuous>  piperacillin/tazobactam IVPB. 3.375Gram(s) IV Intermittent every 8 hours  pantoprazole  Injectable 40milliGRAM(s) IV Push every 12 hours  artificial  tears Solution 1Drop(s) Both EYES three times a day  dexamethasone  Injectable 2milliGRAM(s) IV Push daily  ALBUTerol/ipratropium for Nebulization 3milliLiter(s) Nebulizer <User Schedule>  morphine  - Injectable 4milliGRAM(s) IV Push every 8 hours    MEDICATIONS  (PRN):  LORazepam    IVPB 0.25milliGRAM(s) IV Intermittent four times a day PRN Anxiety  ondansetron Injectable 4milliGRAM(s) IV Push four times a day PRN Nausea and/or Vomiting  haloperidol    Injectable 0.5milliGRAM(s) IV Push every 4 hours PRN Nausea  haloperidol    Injectable 0.5milliGRAM(s) IV Push two times a day PRN agitation  morphine  - Injectable 4milliGRAM(s) IV Push every 1 hour PRN Severe Pain (7 - 10)      PRESENT SYMPTOMS:  Source: [ x] Patient   [x ] Family   [x ] Team     Pain:                        [x ] No [ ] Yes             [ ] Mild [ ] Moderate [ ] Severe    Onset -  Location -  Duration -  Character -  Alleviating/Aggravating -  Radiation -  Timing -      Dyspnea:                [ x] No [ ] Yes             [ ] Mild [ ] Moderate [ ] Severe    Anxiety:                  [ ] No [x ] Yes             [x ] Mild [ ] Moderate [ ] Severe    Fatigue:                  [ ] No [x ] Yes             [ ] Mild [ x] Moderate [ ] Severe    Nausea:                  [x ] No [ ] Yes             [ ] Mild [ ] Moderate [ ] Severe    Loss of appetite:   [ ] No [x ] Yes             [ ] Mild [ ] Moderate [x ] Severe    Constipation:        [ ] No [x ] Yes             [ ] Mild [ ] Moderate [x ] Severe    Other Symptoms:  [ ] All other review of systems negative   [ ] Unable to obtain due to poor mentation     Karnofsky Performance Score/Palliative Performance Status Version 2:         %    PHYSICAL EXAM:    Vital Signs Last 24 Hrs  T(C): 36.3, Max: 36.8 (06-16 @ 20:07)  T(F): 97.4, Max: 98.3 (06-16 @ 20:07)  HR: 77 (65 - 77)  BP: 112/85 (112/85 - 128/79)  BP(mean): --  RR: 18 (18 - 18)  SpO2: 93% (93% - 93%) I&O's Summary    I & Os for current day (as of 17 Jun 2017 11:30)  =============================================  IN: 920 ml / OUT: 800 ml / NET: 120 ml      General:  [x ] Alert          [x ] Oriented x  1    [ ] Lethargic  [ ] Somnolent   [ ] Agitated     [ ] Cachexia   [ ] Unarousable  [x ] Verbal       [ ] Non-Verbal    HEENT:  [x ] Normal   [ ] Dry mouth   [ ] ET Tube    [ ] Trach  [ ] Oral lesions    Lungs:   [x ] Clear                         [ ] Tachypnea  [ ] Audible excessive secretions   [ ] Rhonchi                   [ ] Right             [ ] Left                                             [ ] Bilateral  [ ] Crackles                   [ ] Right             [ ] Left                                             [ ] Bilateral  [ ] Wheezing                [ ] Right             [ ] Left                                             [ ] Bilateral  [ ] Respiratory failure [ ] Acute           [ ] Chronic          [ ] Hypoxemic    [ ] Hypercarbic            [ ] Other    Cardiovascular:   [x ] Regular [ ] Irregular [ ] Tachycardia   [ ] Bradycardia  [ ] Murmur [ ] Other  [ ] Shock [ ] Septic [ ] Hypovolemic [ ] Neurogenic [ ] Cardiogenic   [ ] Vasopressors [ ] Inotrophs    Abdomen:   [x ] Soft  [x ] Mildly Distended   [x ] +BS Hyperactive    [ x] Non tender [ ] Tender  [ ] Other  [ ]PEG   [ ]OGT/ NGT   [ ] Other          Last BM:   6/12/17    Genitourinary:  [ ] Normal [ ] Incontinent   [ ] Oliguria/Anuria   [x ] Damico  [ x] Other -right inguinal hernia enlarged with incarcerated bowel with tense skin mildly tender        Musculoskeletal:    [ ] Normal                                         [x ] Generalized Weakness     [ ] Edema  [x ] Bedbound [ ]  Ambulatory [ ] with [ ] without assistance   [ ] Functional quadriplegia    Neurological:   [ ] No focal deficits       [ x] Cognitive impairment  [ ] Dysphagia [ ] Dysarthria [ ] Paresis [ ] Other   [ ] Brain compression  [ ] Cerebral edema            [ ] Encephalopathy    Skin:   [x ] Normal - no skin issue about perineum or scrotum  [ ] Rash     [ ] Ulcer(s) type [ ] Diabetic [ ] Pressure [ ] Other   Stage        Present on Admission: [ ]  Yes [ ]  No      LABS:    None today    Protein Calorie Malnutrition: [ ] Mild [ ] Moderate [ ] Severe      Oral Intake:   [x ] Unable/mouth care only [ ] Minimal [ ] Moderate [ ] Full Capability    Diet:   [x ] NPO   [ ] Tube feeds   [ ] TPN   [ ] Other     REFERRALS:     [ ] Chaplaincy               [ ] Hospice Care                  [ ] Child Life                    [ ] Social Work    [ ] Case management [ ] Nutrition                         [ ] Holistic Therapy        [ ] Wound Care   [ ]Physical Therapy     [ ] Occupational Therapy  [ ]] Speech Language Pathology  [ ] Other   [x ]See goals of care note in [place]    RADIOLOGY & ADDITIONAL STUDIES:    None today Geriatric and Palliative Care Progress Note [ x]   Hospice Progress Note [ ]     HPI:  89 M with gastritis/gastric ulcer, gait instability s/p multiple falls, mild vascular dementia, hypothyroidism, urinary retention s/p TURP, HTN, HLD, CAD s/p CABG, depression, hearing and visual deficits, and prior history of right inguinal hernia with bowel incarceration s/p February 2017 manual reduction presents with intractable abdominal pain with N/V secondary to recurrence of right inguinal hernia with incarceration. Surgery attempted to manually reduce but was unsuccessful, and felt that surgery was too high risk to undertake. Two sons agreed to supportive care in Palliative Care Suite, with hopes that bowel obstruction would spontaneous reverse. Patient suffered HS delirium, well-controlled with ativan, and mild nausea, but overall described having pain well-managed with morphine.     OVERNIGHT: Mild delirium around midnight, otherwise patient denies pain, nausea, flatulence, belching or has any questions or concerns. Requesting NOT to have scheduled morphine. Sons alternate staying overnight, and remain at bedside. While being washed this morning, staffed observed leaking stool from his rectum. BS hyperactive but in all 4 quadrants.    ADVANCE DIRECTIVES:    DNR                     [x ] YES [ ] NO                      [ ] Completed  MOLST                 [  ] YES [ x] NO                      [ ] Completed  Health Care Proxy [x ] YES  [ ] NO                      [ ] Completed  Living Will             [x ] YES [ ] NO             [ ] Surrogate  [ x] HCP  [ ] Guardian:     Joby soliz                                                     Phone#:     Allergies    Percocet 5/325 (Unknown)    Intolerances    chocolate (Vomiting)      MEDICATIONS  (STANDING):  levothyroxine Injectable 25MICROGram(s) IV Push daily  sodium chloride 0.9%. 1000milliLiter(s) IV Continuous <Continuous>  piperacillin/tazobactam IVPB. 3.375Gram(s) IV Intermittent every 8 hours  pantoprazole  Injectable 40milliGRAM(s) IV Push every 12 hours  artificial  tears Solution 1Drop(s) Both EYES three times a day  dexamethasone  Injectable 2milliGRAM(s) IV Push daily  ALBUTerol/ipratropium for Nebulization 3milliLiter(s) Nebulizer <User Schedule>  morphine  - Injectable 4milliGRAM(s) IV Push every 8 hours    MEDICATIONS  (PRN):  LORazepam    IVPB 0.25milliGRAM(s) IV Intermittent four times a day PRN Anxiety  ondansetron Injectable 4milliGRAM(s) IV Push four times a day PRN Nausea and/or Vomiting  haloperidol    Injectable 0.5milliGRAM(s) IV Push every 4 hours PRN Nausea  haloperidol    Injectable 0.5milliGRAM(s) IV Push two times a day PRN agitation  morphine  - Injectable 4milliGRAM(s) IV Push every 1 hour PRN Severe Pain (7 - 10)      PRESENT SYMPTOMS:  Source: [ x] Patient   [x ] Family   [x ] Team     Pain:                        [x ] No [ ] Yes             [ ] Mild [ ] Moderate [ ] Severe    Onset -  Location -  Duration -  Character -  Alleviating/Aggravating -  Radiation -  Timing -      Dyspnea:                [ x] No [ ] Yes             [ ] Mild [ ] Moderate [ ] Severe    Anxiety:                  [ ] No [x ] Yes             [x ] Mild [ ] Moderate [ ] Severe    Fatigue:                  [ ] No [x ] Yes             [ ] Mild [ x] Moderate [ ] Severe    Nausea:                  [x ] No [ ] Yes             [ ] Mild [ ] Moderate [ ] Severe    Loss of appetite:   [ ] No [x ] Yes             [ ] Mild [ ] Moderate [x ] Severe    Constipation:        [x ] No [ ] Yes             [ ] Mild [ ] Moderate [ ] Severe    Other Symptoms:  [x ] All other review of systems negative   [ ] Unable to obtain due to poor mentation     Karnofsky Performance Score/Palliative Performance Status Version 2:  10       %    PHYSICAL EXAM:    Vital Signs Last 24 Hrs  T(C): 36.3, Max: 36.8 (06-16 @ 20:07)  T(F): 97.4, Max: 98.3 (06-16 @ 20:07)  HR: 77 (65 - 77)  BP: 112/85 (112/85 - 128/79)  BP(mean): --  RR: 18 (18 - 18)  SpO2: 93% (93% - 93%) I&O's Summary    I & Os for current day (as of 17 Jun 2017 11:30)  =============================================  IN: 920 ml / OUT: 800 ml / NET: 120 ml      General:  [x ] Alert          [x ] Oriented x  1    [ ] Lethargic  [ ] Somnolent   [ ] Agitated     [ ] Cachexia   [ ] Unarousable  [x ] Verbal       [ ] Non-Verbal    HEENT:  [x ] Normal   [ ] Dry mouth   [ ] ET Tube    [ ] Trach  [ ] Oral lesions    Lungs:   [x ] Clear                         [ ] Tachypnea  [ ] Audible excessive secretions   [ ] Rhonchi                   [ ] Right             [ ] Left                                             [ ] Bilateral  [ ] Crackles                   [ ] Right             [ ] Left                                             [ ] Bilateral  [ ] Wheezing                [ ] Right             [ ] Left                                             [ ] Bilateral  [ ] Respiratory failure [ ] Acute           [ ] Chronic          [ ] Hypoxemic    [ ] Hypercarbic            [ ] Other    Cardiovascular:   [x ] Regular [ ] Irregular [ ] Tachycardia   [ ] Bradycardia  [ ] Murmur [ ] Other  [ ] Shock [ ] Septic [ ] Hypovolemic [ ] Neurogenic [ ] Cardiogenic   [ ] Vasopressors [ ] Inotrophs    Abdomen:   [x ] Soft  [x ] Mildly Distended   [x ] +BS Hyperactive    [ x] Non tender [ ] Tender  [ ] Other  [ ]PEG   [ ]OGT/ NGT   [ ] Other          Last BM:   6/17/17    Genitourinary:  [ ] Normal [ ] Incontinent   [ ] Oliguria/Anuria   [x ] Damico  [ x] Other -right inguinal hernia enlarged with incarcerated bowel with tense skin mildly tender        Musculoskeletal:    [ ] Normal                                         [x ] Generalized Weakness     [ ] Edema  [x ] Bedbound [ ]  Ambulatory [ ] with [ ] without assistance   [ ] Functional quadriplegia    Neurological:   [ ] No focal deficits       [ x] Cognitive impairment  [ ] Dysphagia [ ] Dysarthria [ ] Paresis [ ] Other   [ ] Brain compression  [ ] Cerebral edema            [ ] Encephalopathy    Skin:   [x ] Normal - no skin issue about perineum or scrotum  [ ] Rash     [ ] Ulcer(s) type [ ] Diabetic [ ] Pressure [ ] Other   Stage        Present on Admission: [ ]  Yes [ ]  No      LABS:    None today    Protein Calorie Malnutrition: [ ] Mild [ ] Moderate [ ] Severe      Oral Intake:   [x ] Unable/mouth care only [ ] Minimal [ ] Moderate [ ] Full Capability    Diet:   [x ] NPO   [ ] Tube feeds   [ ] TPN   [ ] Other     REFERRALS:     [ ] Chaplaincy               [ ] Hospice Care                  [ ] Child Life                    [ ] Social Work    [ ] Case management [ ] Nutrition                         [ ] Holistic Therapy        [ ] Wound Care   [ ]Physical Therapy     [ ] Occupational Therapy  [ ] Speech Language Pathology  [ ] Other   [x ]See goals of care note in [place]    RADIOLOGY & ADDITIONAL STUDIES:    None today

## 2017-06-17 NOTE — PROGRESS NOTE ADULT - PROBLEM SELECTOR PLAN 1
-- Secondary to bowel obstruction into right inguinal hernia - recurrent and not a surgical candidate.  -- NPO x ice  -- No NGT at this time  -- Continue Zofran 0.4 mg IVP Q8 PRN  -- Continue Haldol 0.5mg IVP Q6 PRN  -- Continue Decadron 2mg IVP daily

## 2017-06-17 NOTE — PROGRESS NOTE ADULT - PROBLEM SELECTOR PLAN 5
Emotional support for sons at bedside.  Reassurance and observation of GI symptoms in hopes that it will reverse its course.   Intend to provide end of life care in PCU.

## 2017-06-17 NOTE — PROGRESS NOTE ADULT - PROBLEM SELECTOR PLAN 3
Some baseline secondary to mild-moderate dementia.  Reassurance  Maintain day/night ritual.  Continue Haldol 0.5mg IVP Q6 PRN

## 2017-06-18 PROCEDURE — 99233 SBSQ HOSP IP/OBS HIGH 50: CPT | Mod: GC

## 2017-06-18 RX ORDER — IPRATROPIUM/ALBUTEROL SULFATE 18-103MCG
3 AEROSOL WITH ADAPTER (GRAM) INHALATION EVERY 6 HOURS
Qty: 0 | Refills: 0 | Status: DISCONTINUED | OUTPATIENT
Start: 2017-06-18 | End: 2017-06-19

## 2017-06-18 RX ORDER — MORPHINE SULFATE 50 MG/1
4 CAPSULE, EXTENDED RELEASE ORAL EVERY 6 HOURS
Qty: 0 | Refills: 0 | Status: DISCONTINUED | OUTPATIENT
Start: 2017-06-18 | End: 2017-06-20

## 2017-06-18 RX ADMIN — Medication 1 DROP(S): at 22:13

## 2017-06-18 RX ADMIN — Medication 25 MICROGRAM(S): at 06:03

## 2017-06-18 RX ADMIN — Medication 1 DROP(S): at 15:06

## 2017-06-18 RX ADMIN — PANTOPRAZOLE SODIUM 40 MILLIGRAM(S): 20 TABLET, DELAYED RELEASE ORAL at 17:47

## 2017-06-18 RX ADMIN — PIPERACILLIN AND TAZOBACTAM 25 GRAM(S): 4; .5 INJECTION, POWDER, LYOPHILIZED, FOR SOLUTION INTRAVENOUS at 22:13

## 2017-06-18 RX ADMIN — PIPERACILLIN AND TAZOBACTAM 25 GRAM(S): 4; .5 INJECTION, POWDER, LYOPHILIZED, FOR SOLUTION INTRAVENOUS at 15:06

## 2017-06-18 RX ADMIN — Medication 3 MILLILITER(S): at 07:43

## 2017-06-18 RX ADMIN — MORPHINE SULFATE 4 MILLIGRAM(S): 50 CAPSULE, EXTENDED RELEASE ORAL at 08:15

## 2017-06-18 RX ADMIN — MORPHINE SULFATE 4 MILLIGRAM(S): 50 CAPSULE, EXTENDED RELEASE ORAL at 07:54

## 2017-06-18 RX ADMIN — Medication 1 DROP(S): at 06:03

## 2017-06-18 RX ADMIN — SODIUM CHLORIDE 40 MILLILITER(S): 9 INJECTION, SOLUTION INTRAVENOUS at 06:05

## 2017-06-18 RX ADMIN — MORPHINE SULFATE 4 MILLIGRAM(S): 50 CAPSULE, EXTENDED RELEASE ORAL at 12:46

## 2017-06-18 RX ADMIN — HALOPERIDOL DECANOATE 0.5 MILLIGRAM(S): 100 INJECTION INTRAMUSCULAR at 22:14

## 2017-06-18 RX ADMIN — Medication 4 MILLIGRAM(S): at 06:03

## 2017-06-18 RX ADMIN — MORPHINE SULFATE 4 MILLIGRAM(S): 50 CAPSULE, EXTENDED RELEASE ORAL at 17:48

## 2017-06-18 RX ADMIN — PANTOPRAZOLE SODIUM 40 MILLIGRAM(S): 20 TABLET, DELAYED RELEASE ORAL at 06:03

## 2017-06-18 RX ADMIN — PIPERACILLIN AND TAZOBACTAM 25 GRAM(S): 4; .5 INJECTION, POWDER, LYOPHILIZED, FOR SOLUTION INTRAVENOUS at 06:02

## 2017-06-18 NOTE — PROGRESS NOTE ADULT - PROBLEM SELECTOR PLAN 1
Secondary to bowel obstruction and inguinal hernia.  Continue decadron 4mg IVP Daily  Zosyn 3.375mg IVPB Q8 for prophylactic treatment for bacterial translocation  Reschedule Morphine 4mg IVP Q6.  Continue Morphine 4mg IVP Q1 PRN.

## 2017-06-18 NOTE — PROGRESS NOTE ADULT - PROBLEM SELECTOR PLAN 2
-- Secondary to bowel obstruction into right inguinal hernia - recurrent, NOT surgical candidate.  -- NPO x ice  -- No NGT at this time  -- Continue Zofran 0.4 mg IVP Q8 PRN  -- Continue Haldol 0.5mg IVP Q6 PRN  -- Continue Decadron 4mg IVP daily

## 2017-06-18 NOTE — PROGRESS NOTE ADULT - SUBJECTIVE AND OBJECTIVE BOX
Geriatric and Palliative Care Progress Note [x ]   Hospice Progress Note [ ]     HPI:  89 M with gastritis/gastric ulcer, gait instability s/p multiple falls, mild vascular dementia, hypothyroidism, urinary retention s/p TURP, HTN, HLD, CAD s/p CABG, depression, hearing and visual deficits, and prior history of right inguinal hernia with bowel incarceration s/p February 2017 manual reduction presents with intractable abdominal pain with N/V secondary to recurrence of right inguinal hernia with incarceration. Surgery attempted to manually reduce but was unsuccessful, and felt that surgery was too high risk to undertake. Two sons agreed to supportive care in Palliative Care Suite, with hopes that bowel obstruction would spontaneous reverse. Patient suffered HS delirium, well-controlled with ativan, and mild nausea, but overall described having pain well-managed with morphine. Patient had one episode of leaking stool from rectum on 6/17/17 without flatulence, but since that time has had increased inguinal and abdominal pain requiring additional dosing of morphine.    OVERNIGHT: Patient's pain continues to worsen, but required few doses of breakthrough morphine (family had requested to stop the scheduled Q8 morphine yesterday). Today he took 2 breakthrough doses for a total of 8mg IVP and became very confused and lethargic.    ADVANCE DIRECTIVES:    DNR                     [x ] YES [ ] NO                      [ ] Completed  MOLST                 [  ] YES [ x] NO                      [ ] Completed  Health Care Proxy [x ] YES  [ ] NO                      [ ] Completed  Living Will             [x ] YES [ ] NO             [ ] Surrogate  [ x] HCP  [ ] Guardian:     2  martínez                                                     Phone#:     Allergies    Percocet 5/325 (Unknown)    Intolerances    chocolate (Vomiting)      MEDICATIONS  (STANDING):  levothyroxine Injectable 25MICROGram(s) IV Push daily  piperacillin/tazobactam IVPB. 3.375Gram(s) IV Intermittent every 8 hours  pantoprazole  Injectable 40milliGRAM(s) IV Push every 12 hours  artificial  tears Solution 1Drop(s) Both EYES three times a day  dexamethasone  Injectable 4milliGRAM(s) IV Push daily  dextrose 5% + sodium chloride 0.9%. 1000milliLiter(s) IV Continuous <Continuous>  morphine  - Injectable 4milliGRAM(s) IV Push every 6 hours    MEDICATIONS  (PRN):  ondansetron Injectable 4milliGRAM(s) IV Push four times a day PRN Nausea and/or Vomiting  haloperidol    Injectable 0.5milliGRAM(s) IV Push every 4 hours PRN Nausea  haloperidol    Injectable 0.5milliGRAM(s) IV Push two times a day PRN agitation  morphine  - Injectable 4milliGRAM(s) IV Push every 1 hour PRN pain  LORazepam   Injectable 0.5milliGRAM(s) IV Push every 1 hour PRN Agitation  ALBUTerol/ipratropium for Nebulization 3milliLiter(s) Nebulizer every 6 hours PRN Shortness of Breath and/or Wheezing      PRESENT SYMPTOMS:  Source: [x ] Patient   [x ] Family   [x ] Team     Pain:                        [ ] No [x ] Yes             [ ] Mild [ ] Moderate [x ] Severe    Onset -                           Prior to admission  Location -                                right inguinal/ epigastric  Duration -                                       constant  Character -                                              deep aching  Alleviating/Aggravating -           improved with morphine, unable to determine what exacerbates  Radiation -                    denies radiation  Timing -               no specific temporal association      Dyspnea:                [ x] No [ ] Yes             [ ] Mild [ ] Moderate [ ] Severe    Anxiety:                  [ ] No [ x] Yes             [x ] Mild [ ] Moderate [ ] Severe    Fatigue:                  [ ] No [ x] Yes             [ ] Mild [ x] Moderate [ ] Severe    Nausea:                  [x ] No [ ] Yes             [ ] Mild [ ] Moderate [ ] Severe    Loss of appetite:   [ ] No [x ] Yes             [ ] Mild [ ] Moderate [x ] Severe    Constipation:        [x ] No [ ] Yes             [ ] Mild [ ] Moderate [ ] Severe    Other Symptoms:  [x ] All other review of systems negative   [ ] Unable to obtain due to poor mentation     Karnofsky Performance Score/Palliative Performance Status Version 2:    10-20     %    PHYSICAL EXAM:    Vital Signs Last 24 Hrs  T(C): 36.3, Max: 36.3 (06-18 @ 08:48)  T(F): 97.4, Max: 97.4 (06-18 @ 08:48)  HR: 68 (68 - 68)  BP: 149/79 (149/79 - 149/79)  BP(mean): --  RR: 18 (18 - 18)  SpO2: 94% (94% - 94%) I&O's Summary    I & Os for current day (as of 18 Jun 2017 14:10)  =============================================  IN: 0 ml / OUT: 975 ml / NET: -975 ml      General:  [x ] Alert          [x ] Oriented x2     [ ] Lethargic  [ ] Somnolent   [ ] Agitated     [ ] Cachexia   [ ] Unarousable  [x ] Verbal       [ ] Non-Verbal    HEENT:  [ ] Normal   [x ] Dry mouth   [ ] ET Tube    [ ] Trach  [ ] Oral lesions    Lungs:   [x ] Clear                         [ ] Tachypnea  [ ] Audible excessive secretions   [ ] Rhonchi                   [ ] Right             [ ] Left                                             [ ] Bilateral  [ ] Crackles                   [ ] Right             [ ] Left                                             [ ] Bilateral  [ ] Wheezing                [ ] Right             [ ] Left                                             [ ] Bilateral  [ ] Respiratory failure [ ] Acute           [ ] Chronic          [ ] Hypoxemic    [ ] Hypercarbic            [ ] Other    Cardiovascular:   [x ] Regular [ ] Irregular [ ] Tachycardia   [ ] Bradycardia  [ ] Murmur [ ] Other  [ ] Shock [ ] Septic [ ] Hypovolemic [ ] Neurogenic [ ] Cardiogenic   [ ] Vasopressors [ ] Inotrophs    Abdomen:   [x ] Soft  [x ] Distended   [x ] +BS Hyperactive    [ ] Non tender [x ] Tender epigastric  [ ] Other  [ ]PEG   [ ]OGT/ NGT   [x ] Other -tender right lower quadrant       Last BM:       Genitourinary:  [ ] Normal [ ] Incontinent   [ ] Oliguria/Anuria   [x ] Damico  [ x] Other - enlarged tense dark pink right scrotum and inguinal hernia area        Musculoskeletal:    [ ] Normal                                         [x ] Weakness     [ ] Edema  [x ] Bedbound [ ]  Ambulatory [ ] with [ ] without assistance   [ ] Functional quadriplegia    Neurological:   [ ] No focal deficits       [ x] Cognitive impairment  [ ] Dysphagia [ ] Dysarthria [ ] Paresis [ ] Other   [ ] Brain compression  [ ] Cerebral edema            [ ] Encephalopathy    Skin:   [ x] Normal  [ ] Rash     [ ] Ulcer(s) type [ ] Diabetic [ ] Pressure [ ] Other   Stage        Present on Admission: [ ]  Yes [ ]  No      LABS:    None today    Protein Calorie Malnutrition: [ ] Mild [ ] Moderate [ ] Severe      Oral Intake:   [x ] Unable/mouth care only [ ] Minimal [ ] Moderate [ ] Full Capability    Diet:   [x ] NPO   [ ] Tube feeds   [ ] TPN   [ ] Other     REFERRALS:     [ ] Chaplaincy               [ ] Hospice Care                  [ ] Child Life                    [ ] Social Work    [ ] Case management [ ] Nutrition                         [ ] Holistic Therapy        [ ] Wound Care   [ ]Physical Therapy     [ ] Occupational Therapy  [ ] Speech Language Pathology  [ ] Other   [ ]See goals of care note in [facility]    RADIOLOGY & ADDITIONAL STUDIES:    None today

## 2017-06-19 RX ORDER — ROBINUL 0.2 MG/ML
0.4 INJECTION INTRAMUSCULAR; INTRAVENOUS EVERY 6 HOURS
Qty: 0 | Refills: 0 | Status: DISCONTINUED | OUTPATIENT
Start: 2017-06-19 | End: 2017-06-24

## 2017-06-19 RX ORDER — SODIUM CHLORIDE 9 MG/ML
1000 INJECTION, SOLUTION INTRAVENOUS
Qty: 0 | Refills: 0 | Status: DISCONTINUED | OUTPATIENT
Start: 2017-06-19 | End: 2017-06-24

## 2017-06-19 RX ORDER — MORPHINE SULFATE 50 MG/1
4 CAPSULE, EXTENDED RELEASE ORAL
Qty: 0 | Refills: 0 | Status: DISCONTINUED | OUTPATIENT
Start: 2017-06-19 | End: 2017-06-20

## 2017-06-19 RX ADMIN — Medication 1 DROP(S): at 21:23

## 2017-06-19 RX ADMIN — PIPERACILLIN AND TAZOBACTAM 25 GRAM(S): 4; .5 INJECTION, POWDER, LYOPHILIZED, FOR SOLUTION INTRAVENOUS at 21:23

## 2017-06-19 RX ADMIN — SODIUM CHLORIDE 20 MILLILITER(S): 9 INJECTION, SOLUTION INTRAVENOUS at 11:41

## 2017-06-19 RX ADMIN — Medication 1 DROP(S): at 14:43

## 2017-06-19 RX ADMIN — Medication 25 MICROGRAM(S): at 06:15

## 2017-06-19 RX ADMIN — MORPHINE SULFATE 4 MILLIGRAM(S): 50 CAPSULE, EXTENDED RELEASE ORAL at 00:15

## 2017-06-19 RX ADMIN — SODIUM CHLORIDE 20 MILLILITER(S): 9 INJECTION, SOLUTION INTRAVENOUS at 20:09

## 2017-06-19 RX ADMIN — MORPHINE SULFATE 4 MILLIGRAM(S): 50 CAPSULE, EXTENDED RELEASE ORAL at 06:12

## 2017-06-19 RX ADMIN — MORPHINE SULFATE 4 MILLIGRAM(S): 50 CAPSULE, EXTENDED RELEASE ORAL at 17:14

## 2017-06-19 RX ADMIN — Medication 0.5 MILLIGRAM(S): at 22:04

## 2017-06-19 RX ADMIN — PIPERACILLIN AND TAZOBACTAM 25 GRAM(S): 4; .5 INJECTION, POWDER, LYOPHILIZED, FOR SOLUTION INTRAVENOUS at 06:12

## 2017-06-19 RX ADMIN — PANTOPRAZOLE SODIUM 40 MILLIGRAM(S): 20 TABLET, DELAYED RELEASE ORAL at 06:12

## 2017-06-19 RX ADMIN — MORPHINE SULFATE 4 MILLIGRAM(S): 50 CAPSULE, EXTENDED RELEASE ORAL at 11:40

## 2017-06-19 RX ADMIN — PIPERACILLIN AND TAZOBACTAM 25 GRAM(S): 4; .5 INJECTION, POWDER, LYOPHILIZED, FOR SOLUTION INTRAVENOUS at 14:38

## 2017-06-19 RX ADMIN — Medication 1 DROP(S): at 06:12

## 2017-06-19 RX ADMIN — Medication 4 MILLIGRAM(S): at 06:12

## 2017-06-19 RX ADMIN — Medication 0.5 MILLIGRAM(S): at 00:15

## 2017-06-19 NOTE — PROGRESS NOTE ADULT - PROBLEM SELECTOR PLAN 3
Family meeting today for GOC; see GOC later today Family meeting today. Met with pt and 2 sons at bedside w nurse mgr. Pt is awake but not participatory in meeting. Discussed diagnosis, prognosis of days to weeks, symptom mgmt, medication changes and possible dispo planning of hospice at home vs facility dependent upon pt clinical status and needs. Plan is to continue IV abx x7 days and will re-evaluate dispo plan at that time.

## 2017-06-19 NOTE — PROGRESS NOTE ADULT - SUBJECTIVE AND OBJECTIVE BOX
Geriatric and Palliative Care Unit Progress Note [ ] Hospice Progress Note [ ]     HPI:  89yo Man with PMH and PSH as below, including a February 2017 visit for incarcerated right inguinal hernia which was reduced by the surgical team, is brought in by his sons due to concerns of another incarcerated right sided inguinal hernia. Hernia unable to be reduced and plan for comfort care in PCU        ADVANCE DIRECTIVES:    DNR [x ] YES [ ] NO                            [ ] Completed  MOLST  [ ] YES [x ] NO                      [ ] Completed  Health Care Proxy [ x] YES  [ ] NO   [ ] Completed  Living Will  [x ] YES [ ] NO            Allergies    Percocet 5/325 (Unknown)    Intolerances    chocolate (Vomiting)      MEDICATIONS  (STANDING):  piperacillin/tazobactam IVPB. 3.375Gram(s) IV Intermittent every 8 hours  artificial  tears Solution 1Drop(s) Both EYES three times a day  morphine  - Injectable 4milliGRAM(s) IV Push every 6 hours  dextrose 5% + sodium chloride 0.9%. 1000milliLiter(s) IV Continuous <Continuous>  LORazepam   Injectable 0.5milliGRAM(s) IV Push at bedtime    MEDICATIONS  (PRN):  morphine  - Injectable 4milliGRAM(s) IV Push every 1 hour PRN pain  LORazepam   Injectable 0.5milliGRAM(s) IV Push every 1 hour PRN Agitation  morphine  - Injectable 4milliGRAM(s) IV Push every 1 hour PRN dyspnea  glycopyrrolate Injectable 0.4milliGRAM(s) IV Push every 6 hours PRN resp secretions      PRESENT SYMPTOMS:  Source: [ ] Patient   [ ] Family   [x ] Team     Pain:                        [ ] No [ x] Yes             [ ] Mild [ ] Moderate [ ] Severe    Onset - unable to assess  Location -unable to assess  Duration -unable to assess  Character -unable to assess  Alleviating/Aggravating -unable to assess  Radiation -unable to assess  Timing -unable to assess      Dyspnea:                [x ] No [ ] Yes             [ ] Mild [ ] Moderate [ ] Severe    Anxiety:                  [x ] No [ ] Yes             [ ] Mild [ ] Moderate [ ] Severe    Fatigue:                  [ ] No [x ] Yes             [ ] Mild [ ] Moderate [x ] Severe    Nausea:                  [ x] No [ ] Yes             [ ] Mild [ ] Moderate [ ] Severe    Loss of appetite:   [ ] No [x ] Yes             [ ] Mild [ ] Moderate [ x] Severe    Constipation:        [ ] No [ x] Yes             [ ] Mild [ ] Moderate [x ] Severe due to bowel obstruction    Other Symptoms:  [ ] All other review of systems negative   [x ] Unable to obtain due to poor mentation     Karnofsky Performance Score/Palliative Performance Status Version 2:   30      %    PHYSICAL EXAM:  Vital Signs Last 24 Hrs  T(C): 36.3, Max: 36.3 (06-19 @ 08:01)  T(F): 97.3, Max: 97.3 (06-19 @ 08:01)  HR: 59 (59 - 59)  BP: 130/73 (130/73 - 130/73)  BP(mean): --  RR: 18 (18 - 18)  SpO2: 94% (94% - 94%) I&O's Summary  I & Os for 24h ending 19 Jun 2017 07:00  =============================================  IN: 0 ml / OUT: 800 ml / NET: -800 ml    I & Os for current day (as of 19 Jun 2017 15:59)  =============================================  IN: 0 ml / OUT: 600 ml / NET: -600 ml      General:  [ ] Alert  [ ] Oriented x      [ ] Lethargic  [ ] Agitated   [ x] Cachexia   [x ] Unarousable  [ ] Verbal  [x] Non-Verbal    HEENT:  [ ] Normal   [x ] Dry mouth   [ ] ET Tube    [ ] Trach  [ ] Oral lesions    Lungs:   [x ] Clear [ ] Tachypnea  [ ] Audible excessive secretions   [ ] Rhonchi        [ ] Right [ ] Left [ ] Bilateral  [ ] Crackles        [ ] Right [ ] Left [ ] Bilateral  [ ] Wheezing     [ ] Right [ ] Left [ ] Bilateral  [ ] Respiratory failure [ ] Acute [ ] Chronic [ ] Hypoxemic [ ] Hypercarbic [ ] Other    Cardiovascular:   [x ] Regular [ ] Irregular [ ] Tachycardia   [ ] Bradycardia  [ ] Murmur [ ] Other  [ ] Shock [ ] Septic [ ] Hypovolemic [ ] Neurogenic [ ] Cardiogenic   [ ] Vasopressors [ ] Inotrophs    Abdomen:   [ ] Soft  [x ] Distended   [ ] +BS  [x ] Non tender [ ] Tender  [ ]PEG   [ ]OGT/ NGT   Last BM:     [ ] Other    Genitourinary:  [ ] Normal [ ] Incontinent   [ ] Oliguria/Anuria   [ ] Damico  [x ] Other   + large rt scrotal ingunal hernia    Musculoskeletal:    [ ] Normal   [ ] Weakness  [ ] Edema  [ x] Bedbound/Wheelchair bound [ ]  Ambulatory [ ] with [ ] without assistance [ ] Functional quadriplegia    Neurological: [ ] No focal deficits  [x ] Cognitive impairment  [x ] Dysphagia [ ] Dysarthria [ ] Paresis [ ] Other   [ ] Brain compression  [ ] Cerebral edema [ ] Encephalopathy    Skin: [x ] Normal   [ ] Ulcer(s) type [ ] Diabetic [ ] Pressure [ ] Other   Stage        POA [ ]  Yes [ ]  No       [ ] Rash    LABS: none       Protein Calorie Malnutrition: [ ] Mild [ ] Moderate [ x] Severe    Oral Intake: [x ] Unable/mouth care only [ ] Minimal [ ] Moderate [ ] Full Capability  Diet: [x ] NPO [ ] Tube feeds [ ] TPN [ ] Other     RADIOLOGY & ADDITIONAL STUDIES:    REFERRALS:   [ x] Chaplaincy  [ x] Hospice Care  [ ] Child Life  [x ] Social Work  [ ] Case management [ ] Nutrition [x ] Holistic Therapy [ ] Wound Care [ ]Physical Therapy [ ] Other [ ]See goals of care note in [facility]

## 2017-06-19 NOTE — DIETITIAN INITIAL EVALUATION ADULT. - OTHER INFO
Spoke c RN, pt inappropriate for nutrition assessment at this time. RD to remain available as needed.

## 2017-06-19 NOTE — PROGRESS NOTE ADULT - PROBLEM SELECTOR PLAN 1
Secondary to bowel obstruction and inguinal hernia.  Zosyn 3.375mg IVPB Q8 till 6/21  Reschedule Morphine 4mg IVP Q6.  Continue Morphine 4mg IVP Q1 PRN.

## 2017-06-20 DIAGNOSIS — F01.51 VASCULAR DEMENTIA, UNSPECIFIED SEVERITY, WITH BEHAVIORAL DISTURBANCE: ICD-10-CM

## 2017-06-20 RX ORDER — MORPHINE SULFATE 50 MG/1
2 CAPSULE, EXTENDED RELEASE ORAL
Qty: 0 | Refills: 0 | Status: DISCONTINUED | OUTPATIENT
Start: 2017-06-20 | End: 2017-06-22

## 2017-06-20 RX ORDER — MORPHINE SULFATE 50 MG/1
1 CAPSULE, EXTENDED RELEASE ORAL
Qty: 100 | Refills: 0 | Status: DISCONTINUED | OUTPATIENT
Start: 2017-06-20 | End: 2017-06-22

## 2017-06-20 RX ADMIN — MORPHINE SULFATE 1 MG/HR: 50 CAPSULE, EXTENDED RELEASE ORAL at 14:03

## 2017-06-20 RX ADMIN — PIPERACILLIN AND TAZOBACTAM 25 GRAM(S): 4; .5 INJECTION, POWDER, LYOPHILIZED, FOR SOLUTION INTRAVENOUS at 06:01

## 2017-06-20 RX ADMIN — Medication 0.5 MILLIGRAM(S): at 02:28

## 2017-06-20 RX ADMIN — SODIUM CHLORIDE 20 MILLILITER(S): 9 INJECTION, SOLUTION INTRAVENOUS at 19:23

## 2017-06-20 RX ADMIN — PIPERACILLIN AND TAZOBACTAM 25 GRAM(S): 4; .5 INJECTION, POWDER, LYOPHILIZED, FOR SOLUTION INTRAVENOUS at 14:02

## 2017-06-20 RX ADMIN — MORPHINE SULFATE 4 MILLIGRAM(S): 50 CAPSULE, EXTENDED RELEASE ORAL at 00:11

## 2017-06-20 RX ADMIN — MORPHINE SULFATE 2 MILLIGRAM(S): 50 CAPSULE, EXTENDED RELEASE ORAL at 13:50

## 2017-06-20 RX ADMIN — MORPHINE SULFATE 1 MG/HR: 50 CAPSULE, EXTENDED RELEASE ORAL at 19:23

## 2017-06-20 RX ADMIN — MORPHINE SULFATE 2 MILLIGRAM(S): 50 CAPSULE, EXTENDED RELEASE ORAL at 13:33

## 2017-06-20 RX ADMIN — MORPHINE SULFATE 1 MG/HR: 50 CAPSULE, EXTENDED RELEASE ORAL at 19:33

## 2017-06-20 RX ADMIN — Medication 1 DROP(S): at 21:26

## 2017-06-20 RX ADMIN — MORPHINE SULFATE 4 MILLIGRAM(S): 50 CAPSULE, EXTENDED RELEASE ORAL at 00:21

## 2017-06-20 RX ADMIN — Medication 1 DROP(S): at 06:01

## 2017-06-20 RX ADMIN — Medication 1 DROP(S): at 14:02

## 2017-06-20 RX ADMIN — MORPHINE SULFATE 4 MILLIGRAM(S): 50 CAPSULE, EXTENDED RELEASE ORAL at 07:31

## 2017-06-20 RX ADMIN — PIPERACILLIN AND TAZOBACTAM 25 GRAM(S): 4; .5 INJECTION, POWDER, LYOPHILIZED, FOR SOLUTION INTRAVENOUS at 21:26

## 2017-06-20 NOTE — PROGRESS NOTE ADULT - PROBLEM SELECTOR PLAN 5
Emotional support for sons at bedside.  Reassurance and observation of GI symptoms in hopes that it will reverse its course.   Intend to provide end of life care in PCU. Supp Care

## 2017-06-20 NOTE — PROGRESS NOTE ADULT - PROBLEM SELECTOR PLAN 3
-- Acute on chronic due to current SBO  -- Continue Protonix 40mg IVP Q12 Ativan ATC  Change morphine to drip as delirium may be due to uncontrolled pain

## 2017-06-20 NOTE — PROGRESS NOTE ADULT - SUBJECTIVE AND OBJECTIVE BOX
Geriatric and Palliative Care Progress Note [x ]   Hospice Progress Note [ ]     HPI:  89 M with gastritis/gastric ulcer, gait instability s/p multiple falls, mild vascular dementia, hypothyroidism, urinary retention s/p TURP, HTN, HLD, CAD s/p CABG, depression, hearing and visual deficits, and prior history of right inguinal hernia with bowel incarceration s/p February 2017 manual reduction presents with intractable abdominal pain with N/V secondary to recurrence of right inguinal hernia with incarceration. Surgery attempted to manually reduce but was unsuccessful, and felt that surgery was too high risk to undertake. Two sons agreed to supportive care in Palliative Care Suite, with hopes that bowel obstruction would spontaneous reverse. Patient suffered HS delirium, well-controlled with ativan, and mild nausea, but overall described having pain well-managed with morphine. Patient had one episode of leaking stool from rectum on 6/17/17 without flatulence, but since that time has had increased inguinal and abdominal pain requiring additional dosing of morphine.    OVERNIGHT: Pt with agitation overnight, not very responsive to ativan. Presumed pain could be related to pain 2/2 incarcerated hernia and bowel obstruction.     ADVANCE DIRECTIVES:    DNR                     [x ] YES [ ] NO                      [ ] Completed  MOLST                 [  ] YES [ x] NO                      [ ] Completed  Health Care Proxy [x ] YES  [ ] NO                      [ ] Completed  Living Will             [x ] YES [ ] NO             [ ] Surrogate  [ x] HCP  [ ] Guardian:     Joby soliz                                                     Phone#:     Allergies    Percocet 5/325 (Unknown)    Intolerances    chocolate (Vomiting)      MEDICATIONS  (STANDING):  levothyroxine Injectable 25MICROGram(s) IV Push daily  piperacillin/tazobactam IVPB. 3.375Gram(s) IV Intermittent every 8 hours  pantoprazole  Injectable 40milliGRAM(s) IV Push every 12 hours  artificial  tears Solution 1Drop(s) Both EYES three times a day  dexamethasone  Injectable 4milliGRAM(s) IV Push daily  dextrose 5% + sodium chloride 0.9%. 1000milliLiter(s) IV Continuous <Continuous>  morphine  - Injectable 4milliGRAM(s) IV Push every 6 hours    MEDICATIONS  (PRN):  ondansetron Injectable 4milliGRAM(s) IV Push four times a day PRN Nausea and/or Vomiting  haloperidol    Injectable 0.5milliGRAM(s) IV Push every 4 hours PRN Nausea  haloperidol    Injectable 0.5milliGRAM(s) IV Push two times a day PRN agitation  morphine  - Injectable 4milliGRAM(s) IV Push every 1 hour PRN pain  LORazepam   Injectable 0.5milliGRAM(s) IV Push every 1 hour PRN Agitation  ALBUTerol/ipratropium for Nebulization 3milliLiter(s) Nebulizer every 6 hours PRN Shortness of Breath and/or Wheezing      PRESENT SYMPTOMS:  Source: [x ] Patient   [x ] Family   [x ] Team     Pain:                        [ ] No [x ] Yes             [ ] Mild [ ] Moderate [x ] Severe    Onset -                           Prior to admission  Location -                                right inguinal/ epigastric  Duration -                                       constant  Character -                                              deep aching  Alleviating/Aggravating -           improved with morphine, unable to determine what exacerbates  Radiation -                    denies radiation  Timing -               no specific temporal association      Dyspnea:                [ x] No [ ] Yes             [ ] Mild [ ] Moderate [ ] Severe    Anxiety:                  [ ] No [ x] Yes             [x ] Mild [ ] Moderate [ ] Severe    Fatigue:                  [ ] No [ x] Yes             [ ] Mild [ x] Moderate [ ] Severe    Nausea:                  [x ] No [ ] Yes             [ ] Mild [ ] Moderate [ ] Severe    Loss of appetite:   [ ] No [x ] Yes             [ ] Mild [ ] Moderate [x ] Severe    Constipation:        [x ] No [ ] Yes             [ ] Mild [ ] Moderate [ ] Severe    Other Symptoms:  [x ] All other review of systems negative   [ ] Unable to obtain due to poor mentation     Karnofsky Performance Score/Palliative Performance Status Version 2:    10-20     %    PHYSICAL EXAM:    Vital Signs Last 24 Hrs  T(C): 36.3, Max: 36.3 (06-18 @ 08:48)  T(F): 97.4, Max: 97.4 (06-18 @ 08:48)  HR: 68 (68 - 68)  BP: 149/79 (149/79 - 149/79)  BP(mean): --  RR: 18 (18 - 18)  SpO2: 94% (94% - 94%) I&O's Summary    I & Os for current day (as of 18 Jun 2017 14:10)  =============================================  IN: 0 ml / OUT: 975 ml / NET: -975 ml      General:  [x ] Alert          [x ] Oriented x2     [ ] Lethargic  [ ] Somnolent   [ ] Agitated     [ ] Cachexia   [ ] Unarousable  [x ] Verbal       [ ] Non-Verbal    HEENT:  [ ] Normal   [x ] Dry mouth   [ ] ET Tube    [ ] Trach  [ ] Oral lesions    Lungs:   [x ] Clear                         [ ] Tachypnea  [ ] Audible excessive secretions   [ ] Rhonchi                   [ ] Right             [ ] Left                                             [ ] Bilateral  [ ] Crackles                   [ ] Right             [ ] Left                                             [ ] Bilateral  [ ] Wheezing                [ ] Right             [ ] Left                                             [ ] Bilateral  [ ] Respiratory failure [ ] Acute           [ ] Chronic          [ ] Hypoxemic    [ ] Hypercarbic            [ ] Other    Cardiovascular:   [x ] Regular [ ] Irregular [ ] Tachycardia   [ ] Bradycardia  [ ] Murmur [ ] Other  [ ] Shock [ ] Septic [ ] Hypovolemic [ ] Neurogenic [ ] Cardiogenic   [ ] Vasopressors [ ] Inotrophs    Abdomen:   [x ] Soft  [x ] Distended   [x ] +BS Hyperactive    [ ] Non tender [x ] Tender epigastric  [ ] Other  [ ]PEG   [ ]OGT/ NGT   [x ] Other -tender right lower quadrant       Last BM:       Genitourinary:  [ ] Normal [ ] Incontinent   [ ] Oliguria/Anuria   [x ] Damico  [ x] Other - enlarged tense dark pink right scrotum and inguinal hernia area        Musculoskeletal:    [ ] Normal                                         [x ] Weakness     [ ] Edema  [x ] Bedbound [ ]  Ambulatory [ ] with [ ] without assistance   [ ] Functional quadriplegia    Neurological:   [ ] No focal deficits       [ x] Cognitive impairment  [ ] Dysphagia [ ] Dysarthria [ ] Paresis [ ] Other   [ ] Brain compression  [ ] Cerebral edema            [ ] Encephalopathy    Skin:   [ x] Normal  [ ] Rash     [ ] Ulcer(s) type [ ] Diabetic [ ] Pressure [ ] Other   Stage        Present on Admission: [ ]  Yes [ ]  No      LABS:    None today    Protein Calorie Malnutrition: [ ] Mild [ ] Moderate [ ] Severe      Oral Intake:   [x ] Unable/mouth care only [ ] Minimal [ ] Moderate [ ] Full Capability    Diet:   [x ] NPO   [ ] Tube feeds   [ ] TPN   [ ] Other     REFERRALS:     [ ] Chaplaincy               [ ] Hospice Care                  [ ] Child Life                    [ ] Social Work    [ ] Case management [ ] Nutrition                         [ ] Holistic Therapy        [ ] Wound Care   [ ]Physical Therapy     [ ] Occupational Therapy  [ ] Speech Language Pathology  [ ] Other   [ ]See goals of care note in [place]    RADIOLOGY & ADDITIONAL STUDIES:    None today Geriatric and Palliative Care Progress Note [x ]   Hospice Progress Note [ ]     HPI:  89 M with gastritis/gastric ulcer, gait instability s/p multiple falls, mild vascular dementia, hypothyroidism, urinary retention s/p TURP, HTN, HLD, CAD s/p CABG, depression, hearing and visual deficits, and prior history of right inguinal hernia with bowel incarceration s/p February 2017 manual reduction presents with intractable abdominal pain with N/V secondary to recurrence of right inguinal hernia with incarceration. Surgery attempted to manually reduce but was unsuccessful, and felt that surgery was too high risk to undertake. Two sons agreed to supportive care in Palliative Care Suite, with hopes that bowel obstruction would spontaneous reverse. Patient suffered HS delirium, well-controlled with ativan, and mild nausea, but overall described having pain well-managed with morphine. Patient had one episode of leaking stool from rectum on 6/17/17 without flatulence, but since that time has had increased inguinal and abdominal pain requiring additional dosing of morphine.    OVERNIGHT: Pt with agitation overnight, not very responsive to ativan. Presumed pain could be related to pain 2/2 incarcerated hernia and bowel obstruction.     ADVANCE DIRECTIVES:    DNR                     [x ] YES [ ] NO                      [ ] Completed  MOLST                 [  ] YES [ x] NO                      [ ] Completed  Health Care Proxy [x ] YES  [ ] NO                      [ ] Completed  Living Will             [x ] YES [ ] NO             [ ] Surrogate  [ x] HCP  [ ] Guardian:     Joby soliz                                                     Phone#:     Allergies    Percocet 5/325 (Unknown)    Intolerances    chocolate (Vomiting)      MEDICATIONS  (STANDING):  levothyroxine Injectable 25MICROGram(s) IV Push daily  piperacillin/tazobactam IVPB. 3.375Gram(s) IV Intermittent every 8 hours  pantoprazole  Injectable 40milliGRAM(s) IV Push every 12 hours  artificial  tears Solution 1Drop(s) Both EYES three times a day  dexamethasone  Injectable 4milliGRAM(s) IV Push daily  dextrose 5% + sodium chloride 0.9%. 1000milliLiter(s) IV Continuous <Continuous>  morphine  - Injectable 4milliGRAM(s) IV Push every 6 hours    MEDICATIONS  (PRN):  ondansetron Injectable 4milliGRAM(s) IV Push four times a day PRN Nausea and/or Vomiting  haloperidol    Injectable 0.5milliGRAM(s) IV Push every 4 hours PRN Nausea  haloperidol    Injectable 0.5milliGRAM(s) IV Push two times a day PRN agitation  morphine  - Injectable 4milliGRAM(s) IV Push every 1 hour PRN pain  LORazepam   Injectable 0.5milliGRAM(s) IV Push every 1 hour PRN Agitation  ALBUTerol/ipratropium for Nebulization 3milliLiter(s) Nebulizer every 6 hours PRN Shortness of Breath and/or Wheezing      PRESENT SYMPTOMS:  Source: [ ] Patient   [x ] Family   [x ] Team     Pain:                        [ ] No [x ] Yes             [ ] Mild [ x] Moderate [] Severe    Onset -                           Prior to admission  Location -                                right inguinal/ epigastric  Duration -                                       constant  Character -                                              deep aching  Alleviating/Aggravating -           improved with morphine, unable to determine what exacerbates  Radiation -                    denies radiation  Timing -               no specific temporal association      Dyspnea:                [ x] No [ ] Yes             [ ] Mild [ ] Moderate [ ] Severe    Anxiety:                  [ ] No [ x] Yes             [ ] Mild [x ] Moderate [ ] Severe    Fatigue:                  [ ] No [ x] Yes             [ ] Mild [ x] Moderate [ ] Severe    Nausea:                  [x ] No [ ] Yes             [ ] Mild [ ] Moderate [ ] Severe    Loss of appetite:   [ ] No [x ] Yes             [ ] Mild [ ] Moderate [x ] Severe    Constipation:        [ ] No [ x] Yes             [ ] Mild [x ] Moderate [ ] Severe    Other Symptoms:  [] All other review of systems negative   [ x] Unable to obtain due to poor mentation     Karnofsky Performance Score/Palliative Performance Status Version 2:    20     %    PHYSICAL EXAM:    Vital Signs Last 24 Hrs  T(C): 36.4, Max: 36.4 (06-20 @ 10:23)  T(F): 97.6, Max: 97.6 (06-20 @ 10:23)  HR: 57 (57 - 57)  BP: 123/61 (123/61 - 123/61)  BP(mean): --  RR: 18 (18 - 18)  SpO2: 95% (95% - 95%)      General:  [ ] Alert          [ ] Oriented x2     [ ] Lethargic  [x ] Somnolent   [ ] Agitated     [ ] Cachexia   [ ] Unarousable  [ ] Verbal       [ ] Non-Verbal    HEENT:  [ ] Normal   [x ] Dry mouth   [ ] ET Tube    [ ] Trach  [ ] Oral lesions    Lungs:   [x ] Clear                         [ ] Tachypnea  [ ] Audible excessive secretions   [ ] Rhonchi                   [ ] Right             [ ] Left                                             [ ] Bilateral  [ ] Crackles                   [ ] Right             [ ] Left                                             [ ] Bilateral  [ ] Wheezing                [ ] Right             [ ] Left                                             [ ] Bilateral  [ ] Respiratory failure [ ] Acute           [ ] Chronic          [ ] Hypoxemic    [ ] Hypercarbic            [ ] Other    Cardiovascular:   [ ] Regular [x ] Irregular [ ] Tachycardia   [ ] Bradycardia  [ ] Murmur [ ] Other  [ ] Shock [ ] Septic [ ] Hypovolemic [ ] Neurogenic [ ] Cardiogenic   [ ] Vasopressors [ ] Inotropes    Abdomen:   [x ] Soft  [x ] Distended   [x ] +BS Hyperactive    [ ] Non tender [x ] Tender epigastric  [ ] Other  [ ]PEG   [ ]OGT/ NGT   [x ] Other -tender right lower quadrant       Last BM:       Genitourinary:  [ ] Normal [ ] Incontinent   [ ] Oliguria/Anuria   [x ] Damico  [ x] Other - enlarged tense dark pink right scrotum and inguinal hernia area        Musculoskeletal:    [ ] Normal                                         [x ] Weakness     [ ] Edema  [x ] Bedbound [ ]  Ambulatory [ ] with [ ] without assistance   [ ] Functional quadriplegia    Neurological:   [ ] No focal deficits       [ x] Cognitive impairment  [ ] Dysphagia [ ] Dysarthria [ ] Paresis [ ] Other   [ ] Brain compression  [ ] Cerebral edema            [ ] Encephalopathy    Skin:   [ x] Normal  [ ] Rash     [ ] Ulcer(s) type [ ] Diabetic [ ] Pressure [ ] Other   Stage        Present on Admission: [ ]  Yes [ ]  No      LABS:    None today    Protein Calorie Malnutrition: [ ] Mild [ ] Moderate [ ] Severe      Oral Intake:   [x ] Unable/mouth care only [ ] Minimal [ ] Moderate [ ] Full Capability    Diet:   [x ] NPO   [ ] Tube feeds   [ ] TPN   [ ] Other     REFERRALS:     [ ] Chaplaincy               [ ] Hospice Care                  [ ] Child Life                    [ ] Social Work    [ ] Case management [ ] Nutrition                         [ ] Holistic Therapy        [ ] Wound Care   [ ]Physical Therapy     [ ] Occupational Therapy  [ ] Speech Language Pathology  [ ] Other   [ ]See goals of care note in [place]    RADIOLOGY & ADDITIONAL STUDIES:    None today Geriatric and Palliative Care Progress Note [x ]   Hospice Progress Note [ ]     HPI:  89 M with gastritis/gastric ulcer, gait instability s/p multiple falls, mild vascular dementia, hypothyroidism, urinary retention s/p TURP, HTN, HLD, CAD s/p CABG, depression, hearing and visual deficits, and prior history of right inguinal hernia with bowel incarceration s/p February 2017 manual reduction presents with intractable abdominal pain with N/V secondary to recurrence of right inguinal hernia with incarceration. Surgery attempted to manually reduce but was unsuccessful, and felt that surgery was too high risk to undertake. Two sons agreed to supportive care in Palliative Care Suite, with hopes that bowel obstruction would spontaneous reverse. Patient suffered HS delirium, well-controlled with ativan, and mild nausea, but overall described having pain well-managed with morphine. Patient had one episode of leaking stool from rectum on 6/17/17 without flatulence, but since that time has had increased inguinal and abdominal pain requiring additional dosing of morphine.    OVERNIGHT: Pt with agitation overnight, not very responsive to ativan. Presumed pain could be related to pain 2/2 incarcerated hernia and bowel obstruction.     ADVANCE DIRECTIVES:    DNR                     [x ] YES [ ] NO                      [ ] Completed  MOLST                 [  ] YES [ x] NO                      [ ] Completed  Health Care Proxy [x ] YES  [ ] NO                      [ ] Completed  Living Will             [x ] YES [ ] NO             [ ] Surrogate  [ x] HCP  [ ] Guardian:     Joby soliz                                                     Phone#:     Allergies    Percocet 5/325 (Unknown)    Intolerances    chocolate (Vomiting)      MEDICATIONS  (STANDING):  levothyroxine Injectable 25MICROGram(s) IV Push daily  piperacillin/tazobactam IVPB. 3.375Gram(s) IV Intermittent every 8 hours  pantoprazole  Injectable 40milliGRAM(s) IV Push every 12 hours  artificial  tears Solution 1Drop(s) Both EYES three times a day  dexamethasone  Injectable 4milliGRAM(s) IV Push daily  dextrose 5% + sodium chloride 0.9%. 1000milliLiter(s) IV Continuous <Continuous>  morphine  - Injectable 4milliGRAM(s) IV Push every 6 hours    MEDICATIONS  (PRN):  ondansetron Injectable 4milliGRAM(s) IV Push four times a day PRN Nausea and/or Vomiting  haloperidol    Injectable 0.5milliGRAM(s) IV Push every 4 hours PRN Nausea  haloperidol    Injectable 0.5milliGRAM(s) IV Push two times a day PRN agitation  morphine  - Injectable 4milliGRAM(s) IV Push every 1 hour PRN pain  LORazepam   Injectable 0.5milliGRAM(s) IV Push every 1 hour PRN Agitation  ALBUTerol/ipratropium for Nebulization 3milliLiter(s) Nebulizer every 6 hours PRN Shortness of Breath and/or Wheezing      PRESENT SYMPTOMS:  Source: [ ] Patient   [x ] Family   [x ] Team     Pain:                        [ ] No [x ] Yes             [ ] Mild [ x] Moderate [] Severe    Onset -                           Prior to admission  Location -                                right inguinal/ epigastric  Duration -                                       constant  Character -                                              deep aching  Alleviating/Aggravating -           improved with morphine, unable to determine what exacerbates  Radiation -                    denies radiation  Timing -               no specific temporal association      Dyspnea:                [ x] No [ ] Yes             [ ] Mild [ ] Moderate [ ] Severe    Anxiety:                  [ ] No [ x] Yes             [ ] Mild [x ] Moderate [ ] Severe    Fatigue:                  [ ] No [ x] Yes             [ ] Mild [ x] Moderate [ ] Severe    Nausea:                  [x ] No [ ] Yes             [ ] Mild [ ] Moderate [ ] Severe    Loss of appetite:   [ ] No [x ] Yes             [ ] Mild [ ] Moderate [x ] Severe    Constipation:        [ ] No [ x] Yes             [ ] Mild [x ] Moderate [ ] Severe    Other Symptoms:  [] All other review of systems negative   [ x] Unable to obtain due to poor mentation     Karnofsky Performance Score/Palliative Performance Status Version 2:    20     %    PHYSICAL EXAM:    Vital Signs Last 24 Hrs  T(C): 36.4, Max: 36.4 (06-20 @ 10:23)  T(F): 97.6, Max: 97.6 (06-20 @ 10:23)  HR: 57 (57 - 57)  BP: 123/61 (123/61 - 123/61)  BP(mean): --  RR: 18 (18 - 18)  SpO2: 95% (95% - 95%)      General:  [ ] Alert          [ ] Oriented x2     [ ] Lethargic  [x ] Somnolent   [ ] Agitated     [ ] Cachexia   [ ] Unarousable  [ ] Verbal       [ ] Non-Verbal    HEENT:  [ ] Normal   [x ] Dry mouth   [ ] ET Tube    [ ] Trach  [ ] Oral lesions    Lungs:   [x ] Clear                         [ ] Tachypnea  [ ] Audible excessive secretions   [ ] Rhonchi                   [ ] Right             [ ] Left                                             [ ] Bilateral  [ ] Crackles                   [ ] Right             [ ] Left                                             [ ] Bilateral  [ ] Wheezing                [ ] Right             [ ] Left                                             [ ] Bilateral  [ ] Respiratory failure [ ] Acute           [ ] Chronic          [ ] Hypoxemic    [ ] Hypercarbic            [ ] Other    Cardiovascular:   [ ] Regular [x ] Irregular [ ] Tachycardia   [ ] Bradycardia  [ ] Murmur [ ] Other  [ ] Shock [ ] Septic [ ] Hypovolemic [ ] Neurogenic [ ] Cardiogenic   [ ] Vasopressors [ ] Inotropes    Abdomen:   [x ] Soft  [] Distended   [x ] +BS   [x] Non tender [ ] Tender epigastric  [ ] Other  [ ]PEG   [ ]OGT/ NGT      Last BM:       Genitourinary:  [ ] Normal [ ] Incontinent   [ ] Oliguria/Anuria   [x ] Damico  [ x] Other - enlarged tense dark pink right scrotum and inguinal hernia area        Musculoskeletal:    [ ] Normal                                         [x ] Weakness     [ ] Edema  [x ] Bedbound [ ]  Ambulatory [ ] with [ ] without assistance   [ ] Functional quadriplegia    Neurological:   [ ] No focal deficits       [ x] Cognitive impairment  [ ] Dysphagia [ ] Dysarthria [ ] Paresis [ ] Other   [ ] Brain compression  [ ] Cerebral edema            [ ] Encephalopathy    Skin:   [ x] Normal  [ ] Rash     [ ] Ulcer(s) type [ ] Diabetic [ ] Pressure [ ] Other   Stage        Present on Admission: [ ]  Yes [ ]  No      LABS:    None today    Protein Calorie Malnutrition: [x ] Mild [ ] Moderate [ ] Severe      Oral Intake:   [x ] Unable/mouth care only [ ] Minimal [ ] Moderate [ ] Full Capability    Diet:   [x ] NPO   [ ] Tube feeds   [ ] TPN   [ ] Other     REFERRALS:     [ ] Chaplaincy               [ ] Hospice Care                  [ ] Child Life                    [ ] Social Work    [ ] Case management [ ] Nutrition                         [ ] Holistic Therapy        [ ] Wound Care   [ ]Physical Therapy     [ ] Occupational Therapy  [ ] Speech Language Pathology  [ ] Other   [ ]See goals of care note in [place]    RADIOLOGY & ADDITIONAL STUDIES:    None today

## 2017-06-20 NOTE — PROGRESS NOTE ADULT - PROBLEM SELECTOR PLAN 2
-- Secondary to bowel obstruction into right inguinal hernia - recurrent, NOT surgical candidate.  -- NPO x ice  -- No NGT at this time  -- Continue Zofran 0.4 mg IVP Q8 PRN  -- Continue Haldol 0.5mg IVP Q6 PRN  -- Continue Decadron 4mg IVP daily Secondary to bowel obstruction into right inguinal hernia - recurrent, NOT surgical candidate.  Resolved.

## 2017-06-20 NOTE — PROGRESS NOTE ADULT - PROBLEM SELECTOR PLAN 1
Secondary to bowel obstruction and inguinal hernia.  Continue decadron 4mg IVP Daily  Zosyn 3.375mg IVPB Q8 for prophylactic treatment for bacterial translocation  Reschedule Morphine 4mg IVP Q6.  Continue Morphine 4mg IVP Q1 PRN. Secondary to bowel obstruction and inguinal hernia.  Zosyn day 6/7 prophylactic treatment for bacterial translocation  Started morphine drip today

## 2017-06-20 NOTE — PROGRESS NOTE ADULT - PROBLEM SELECTOR PLAN 4
Some baseline secondary to mild-moderate dementia.  Reassurance  Maintain day/night ritual.  Continue Haldol 0.5mg IVP Q6 PRN Support all ADLs, turn and position

## 2017-06-21 PROCEDURE — 99233 SBSQ HOSP IP/OBS HIGH 50: CPT | Mod: GC

## 2017-06-21 PROCEDURE — 71010: CPT | Mod: 26

## 2017-06-21 RX ADMIN — Medication 1 DROP(S): at 21:24

## 2017-06-21 RX ADMIN — Medication 1 DROP(S): at 13:53

## 2017-06-21 RX ADMIN — PIPERACILLIN AND TAZOBACTAM 25 GRAM(S): 4; .5 INJECTION, POWDER, LYOPHILIZED, FOR SOLUTION INTRAVENOUS at 13:53

## 2017-06-21 RX ADMIN — MORPHINE SULFATE 2 MILLIGRAM(S): 50 CAPSULE, EXTENDED RELEASE ORAL at 15:44

## 2017-06-21 RX ADMIN — MORPHINE SULFATE 2 MILLIGRAM(S): 50 CAPSULE, EXTENDED RELEASE ORAL at 22:32

## 2017-06-21 RX ADMIN — MORPHINE SULFATE 1 MG/HR: 50 CAPSULE, EXTENDED RELEASE ORAL at 18:17

## 2017-06-21 RX ADMIN — PIPERACILLIN AND TAZOBACTAM 25 GRAM(S): 4; .5 INJECTION, POWDER, LYOPHILIZED, FOR SOLUTION INTRAVENOUS at 06:04

## 2017-06-21 RX ADMIN — Medication 1 DROP(S): at 06:05

## 2017-06-21 RX ADMIN — PIPERACILLIN AND TAZOBACTAM 25 GRAM(S): 4; .5 INJECTION, POWDER, LYOPHILIZED, FOR SOLUTION INTRAVENOUS at 21:23

## 2017-06-21 RX ADMIN — SODIUM CHLORIDE 20 MILLILITER(S): 9 INJECTION, SOLUTION INTRAVENOUS at 08:18

## 2017-06-21 RX ADMIN — MORPHINE SULFATE 1 MG/HR: 50 CAPSULE, EXTENDED RELEASE ORAL at 08:18

## 2017-06-21 RX ADMIN — MORPHINE SULFATE 2 MILLIGRAM(S): 50 CAPSULE, EXTENDED RELEASE ORAL at 22:47

## 2017-06-21 RX ADMIN — MORPHINE SULFATE 2 MILLIGRAM(S): 50 CAPSULE, EXTENDED RELEASE ORAL at 16:00

## 2017-06-21 RX ADMIN — MORPHINE SULFATE 1 MG/HR: 50 CAPSULE, EXTENDED RELEASE ORAL at 19:30

## 2017-06-21 NOTE — PROGRESS NOTE ADULT - PROBLEM SELECTOR PLAN 2
Secondary to bowel obstruction into right inguinal hernia - recurrent, NOT surgical candidate.  Resolved.

## 2017-06-21 NOTE — PROGRESS NOTE ADULT - SUBJECTIVE AND OBJECTIVE BOX
Geriatric and Palliative Care Progress Note [x ]   Hospice Progress Note [ ]     HPI:  89 M with gastritis/gastric ulcer, gait instability s/p multiple falls, mild vascular dementia, hypothyroidism, urinary retention s/p TURP, HTN, HLD, CAD s/p CABG, depression, hearing and visual deficits, and prior history of right inguinal hernia with bowel incarceration s/p February 2017 manual reduction presents with intractable abdominal pain with N/V secondary to recurrence of right inguinal hernia with incarceration. Surgery attempted to manually reduce but was unsuccessful, and felt that surgery was too high risk to undertake. Two sons agreed to supportive care in Palliative Care Suite, with hopes that bowel obstruction would spontaneous reverse. Patient suffered HS delirium, well-controlled with ativan, and mild nausea, but overall described having pain well-managed with morphine. Patient had one episode of leaking stool from rectum on 6/17/17 without flatulence, but since that time has had increased inguinal and abdominal pain requiring additional dosing of morphine.    OVERNIGHT: Pt much more comfortable on morphine gtt with Ativan ATC. No PRNs needed. Last day of Zosyn is today. Hospice referral made to [facility].    ADVANCE DIRECTIVES:    DNR                     [x ] YES [ ] NO                      [ ] Completed  MOLST                 [  ] YES [ x] NO                      [ ] Completed  Health Care Proxy [x ] YES  [ ] NO                      [ ] Completed  Living Will             [x ] YES [ ] NO             [ ] Surrogate  [ x] HCP  [ ] Guardian:     Joby soliz                                                     Phone#:     Allergies    Percocet 5/325 (Unknown)    Intolerances    chocolate (Vomiting)      MEDICATIONS  (STANDING):  levothyroxine Injectable 25MICROGram(s) IV Push daily  piperacillin/tazobactam IVPB. 3.375Gram(s) IV Intermittent every 8 hours  pantoprazole  Injectable 40milliGRAM(s) IV Push every 12 hours  artificial  tears Solution 1Drop(s) Both EYES three times a day  dexamethasone  Injectable 4milliGRAM(s) IV Push daily  dextrose 5% + sodium chloride 0.9%. 1000milliLiter(s) IV Continuous <Continuous>  morphine  - Injectable 4milliGRAM(s) IV Push every 6 hours    MEDICATIONS  (PRN):  ondansetron Injectable 4milliGRAM(s) IV Push four times a day PRN Nausea and/or Vomiting  haloperidol    Injectable 0.5milliGRAM(s) IV Push every 4 hours PRN Nausea  haloperidol    Injectable 0.5milliGRAM(s) IV Push two times a day PRN agitation  morphine  - Injectable 4milliGRAM(s) IV Push every 1 hour PRN pain  LORazepam   Injectable 0.5milliGRAM(s) IV Push every 1 hour PRN Agitation  ALBUTerol/ipratropium for Nebulization 3milliLiter(s) Nebulizer every 6 hours PRN Shortness of Breath and/or Wheezing      PRESENT SYMPTOMS:  Source: [ ] Patient   [x ] Family   [x ] Team     Pain:                        [ ] No [x ] Yes             [x ] Mild [ ] Moderate [] Severe    Onset -                           Prior to admission  Location -                                right inguinal/ epigastric  Duration -                                       constant  Character -                                              deep aching  Alleviating/Aggravating -           improved with morphine, unable to determine what exacerbates  Radiation -                    denies radiation  Timing -               no specific temporal association      Dyspnea:                [ x] No [ ] Yes             [ ] Mild [ ] Moderate [ ] Severe    Anxiety:                  [x ] No [ ] Yes             [ ] Mild [] Moderate [ ] Severe    Fatigue:                  [ ] No [ x] Yes             [ ] Mild [ ] Moderate [x ] Severe    Nausea:                  [x ] No [ ] Yes             [ ] Mild [ ] Moderate [ ] Severe    Loss of appetite:   [ ] No [x ] Yes             [ ] Mild [ ] Moderate [x ] Severe    Constipation:        [ ] No [ x] Yes             [ ] Mild [x ] Moderate [ ] Severe    Other Symptoms:  [] All other review of systems negative   [ x] Unable to obtain due to poor mentation     Karnofsky Performance Score/Palliative Performance Status Version 2:    10     %    PHYSICAL EXAM:    Vital Signs Last 24 Hrs  T(C): 36.8, Max: 36.8 (06-21 @ 08:04)  T(F): 98.2, Max: 98.2 (06-21 @ 08:04)  HR: 67 (67 - 67)  BP: 142/71 (142/71 - 142/71)  BP(mean): --  RR: 20 (20 - 20)  SpO2: 86% (86% - 86%)    General:  [ ] Alert          [ ] Oriented x2     [ ] Lethargic  [x ] Somnolent   [ ] Agitated     [ ] Cachexia   [ ] Unarousable  [ ] Verbal       [x ] Non-Verbal    HEENT:  [ ] Normal   [x ] Dry mouth   [ ] ET Tube    [ ] Trach  [ ] Oral lesions    Lungs:   [x ] Clear                         [ ] Tachypnea  [ ] Audible excessive secretions   [ ] Rhonchi                   [ ] Right             [ ] Left                                             [ ] Bilateral  [ ] Crackles                   [ ] Right             [ ] Left                                             [ ] Bilateral  [ ] Wheezing                [ ] Right             [ ] Left                                             [ ] Bilateral  [ ] Respiratory failure [ ] Acute           [ ] Chronic          [ ] Hypoxemic    [ ] Hypercarbic            [ ] Other    Cardiovascular:   [ ] Regular [x ] Irregular [ ] Tachycardia   [ ] Bradycardia  [ ] Murmur [ ] Other  [ ] Shock [ ] Septic [ ] Hypovolemic [ ] Neurogenic [ ] Cardiogenic   [ ] Vasopressors [ ] Inotropes    Abdomen:   [x ] Soft  [] Distended   [x ] +BS   [x] Non tender [ ] Tender epigastric  [ ] Other  [ ]PEG   [ ]OGT/ NGT      Last BM:   6/17    Genitourinary:  [ ] Normal [ ] Incontinent   [ ] Oliguria/Anuria   [x ] Damico  [ x] Other - enlarged tense dark pink right scrotum and inguinal hernia area        Musculoskeletal:    [ ] Normal                                         [x ] Weakness     [ ] Edema  [x ] Bedbound [ ]  Ambulatory [ ] with [ ] without assistance   [x ] Functional quadriplegia    Neurological:   [ ] No focal deficits       [ x] Cognitive impairment  [ ] Dysphagia [ ] Dysarthria [ ] Paresis [ ] Other   [ ] Brain compression  [ ] Cerebral edema            [ ] Encephalopathy    Skin:   [ x] Normal  [ ] Rash     [ ] Ulcer(s) type [ ] Diabetic [ ] Pressure [ ] Other   Stage        Present on Admission: [ ]  Yes [ ]  No      LABS:    None today    Protein Calorie Malnutrition: [x ] Mild [ ] Moderate [ ] Severe      Oral Intake:   [x ] Unable/mouth care only [ ] Minimal [ ] Moderate [ ] Full Capability    Diet:   [x ] NPO   [ ] Tube feeds   [ ] TPN   [ ] Other     REFERRALS:     [ ] Chaplaincy               [ ] Hospice Care                  [ ] Child Life                    [ ] Social Work    [ ] Case management [ ] Nutrition                         [ ] Holistic Therapy        [ ] Wound Care   [ ]Physical Therapy     [ ] Occupational Therapy  [ ] Speech Language Pathology  [ ] Other   [ ]See goals of care note in [place]    RADIOLOGY & ADDITIONAL STUDIES:    None today

## 2017-06-21 NOTE — PROGRESS NOTE ADULT - PROBLEM SELECTOR PLAN 1
Secondary to bowel obstruction and inguinal hernia.  Zosyn day 7/7 prophylactic treatment for bacterial translocation  comfortable on morphine gtt

## 2017-06-21 NOTE — PROGRESS NOTE ADULT - PROBLEM SELECTOR PLAN 6
Emotional support for sons at bedside.  referral made for IP hospice.
Emotional support for sons at bedside.  If sx stabilize will make referral for IP hospice.

## 2017-06-22 PROCEDURE — 99233 SBSQ HOSP IP/OBS HIGH 50: CPT | Mod: GC

## 2017-06-22 RX ORDER — MORPHINE SULFATE 50 MG/1
1 CAPSULE, EXTENDED RELEASE ORAL
Qty: 100 | Refills: 0 | Status: DISCONTINUED | OUTPATIENT
Start: 2017-06-22 | End: 2017-06-22

## 2017-06-22 RX ORDER — MORPHINE SULFATE 50 MG/1
4 CAPSULE, EXTENDED RELEASE ORAL
Qty: 0 | Refills: 0 | Status: DISCONTINUED | OUTPATIENT
Start: 2017-06-22 | End: 2017-06-24

## 2017-06-22 RX ORDER — MORPHINE SULFATE 50 MG/1
2 CAPSULE, EXTENDED RELEASE ORAL
Qty: 100 | Refills: 0 | Status: DISCONTINUED | OUTPATIENT
Start: 2017-06-22 | End: 2017-06-24

## 2017-06-22 RX ADMIN — Medication 1 DROP(S): at 21:21

## 2017-06-22 RX ADMIN — MORPHINE SULFATE 2 MILLIGRAM(S): 50 CAPSULE, EXTENDED RELEASE ORAL at 00:55

## 2017-06-22 RX ADMIN — Medication 0.5 MILLIGRAM(S): at 14:44

## 2017-06-22 RX ADMIN — PIPERACILLIN AND TAZOBACTAM 25 GRAM(S): 4; .5 INJECTION, POWDER, LYOPHILIZED, FOR SOLUTION INTRAVENOUS at 06:03

## 2017-06-22 RX ADMIN — MORPHINE SULFATE 2 MG/HR: 50 CAPSULE, EXTENDED RELEASE ORAL at 15:32

## 2017-06-22 RX ADMIN — MORPHINE SULFATE 2 MILLIGRAM(S): 50 CAPSULE, EXTENDED RELEASE ORAL at 01:10

## 2017-06-22 RX ADMIN — MORPHINE SULFATE 2 MG/HR: 50 CAPSULE, EXTENDED RELEASE ORAL at 11:45

## 2017-06-22 RX ADMIN — Medication 1 DROP(S): at 14:45

## 2017-06-22 RX ADMIN — Medication 1 DROP(S): at 06:04

## 2017-06-22 RX ADMIN — MORPHINE SULFATE 1 MG/HR: 50 CAPSULE, EXTENDED RELEASE ORAL at 08:06

## 2017-06-22 RX ADMIN — MORPHINE SULFATE 2 MG/HR: 50 CAPSULE, EXTENDED RELEASE ORAL at 19:15

## 2017-06-22 RX ADMIN — SODIUM CHLORIDE 20 MILLILITER(S): 9 INJECTION, SOLUTION INTRAVENOUS at 19:15

## 2017-06-22 RX ADMIN — Medication 0.5 MILLIGRAM(S): at 21:23

## 2017-06-22 NOTE — PROGRESS NOTE ADULT - PROBLEM SELECTOR PLAN 5
Emotional support for sons at bedside.  Fu IP hospice referral. If pt continues to have uncontrolled sx and continues to decline may hold off on transfer.

## 2017-06-22 NOTE — PROGRESS NOTE ADULT - SUBJECTIVE AND OBJECTIVE BOX
Geriatric and Palliative Care Progress Note [x ]   Hospice Progress Note [ ]     HPI:  89 M with gastritis/gastric ulcer, gait instability s/p multiple falls, mild vascular dementia, hypothyroidism, urinary retention s/p TURP, HTN, HLD, CAD s/p CABG, depression, hearing and visual deficits, and prior history of right inguinal hernia with bowel incarceration s/p February 2017 manual reduction presents with intractable abdominal pain with N/V secondary to recurrence of right inguinal hernia with incarceration. Surgery attempted to manually reduce but was unsuccessful, and felt that surgery was too high risk to undertake. Two sons agreed to supportive care in Palliative Care Suite, with hopes that bowel obstruction would spontaneous reverse. Patient suffered HS delirium, well-controlled with ativan, and mild nausea, but overall described having pain well-managed with morphine. Patient had one episode of leaking stool from rectum on 6/17/17 without flatulence, but since that time has had increased inguinal and abdominal pain requiring additional dosing of morphine.    OVERNIGHT: Pt on morphine gtt required 3 PRNs last night. Ativan ATC DC'd yesterday. Will increase drip and re-start Ativan ATC. Hospice referral made yesterday. Will FU today.     ADVANCE DIRECTIVES:    DNR                     [x ] YES [ ] NO                      [ ] Completed  MOLST                 [  ] YES [ x] NO                      [ ] Completed  Health Care Proxy [x ] YES  [ ] NO                      [ ] Completed  Living Will             [x ] YES [ ] NO             [ ] Surrogate  [ x] HCP  [ ] Guardian:     Joby soliz                                                     Phone#:     Allergies    Percocet 5/325 (Unknown)    Intolerances    chocolate (Vomiting)      MEDICATIONS  (STANDING):  levothyroxine Injectable 25MICROGram(s) IV Push daily  piperacillin/tazobactam IVPB. 3.375Gram(s) IV Intermittent every 8 hours  pantoprazole  Injectable 40milliGRAM(s) IV Push every 12 hours  artificial  tears Solution 1Drop(s) Both EYES three times a day  dexamethasone  Injectable 4milliGRAM(s) IV Push daily  dextrose 5% + sodium chloride 0.9%. 1000milliLiter(s) IV Continuous <Continuous>  morphine  - Injectable 4milliGRAM(s) IV Push every 6 hours    MEDICATIONS  (PRN):  ondansetron Injectable 4milliGRAM(s) IV Push four times a day PRN Nausea and/or Vomiting  haloperidol    Injectable 0.5milliGRAM(s) IV Push every 4 hours PRN Nausea  haloperidol    Injectable 0.5milliGRAM(s) IV Push two times a day PRN agitation  morphine  - Injectable 4milliGRAM(s) IV Push every 1 hour PRN pain  LORazepam   Injectable 0.5milliGRAM(s) IV Push every 1 hour PRN Agitation  ALBUTerol/ipratropium for Nebulization 3milliLiter(s) Nebulizer every 6 hours PRN Shortness of Breath and/or Wheezing      PRESENT SYMPTOMS:  Source: [ ] Patient   [x ] Family   [x ] Team     Pain:                        [ ] No [x ] Yes             [ ] Mild [x] Moderate [] Severe    Onset -                           Prior to admission  Location -                                right inguinal/ epigastric  Duration -                                       constant  Character -                                              deep aching  Alleviating/Aggravating -           improved with morphine, unable to determine what exacerbates  Radiation -                    denies radiation  Timing -               no specific temporal association      Dyspnea:                [ x] No [ ] Yes             [ ] Mild [ ] Moderate [ ] Severe    Anxiety:                  [ ] No [x ] Yes             [ ] Mild [x] Moderate [ ] Severe    Fatigue:                  [ ] No [ x] Yes             [ ] Mild [ ] Moderate [x ] Severe    Nausea:                  [x ] No [ ] Yes             [ ] Mild [ ] Moderate [ ] Severe    Loss of appetite:   [ ] No [x ] Yes             [ ] Mild [ ] Moderate [x ] Severe    Constipation:        [ ] No [ x] Yes             [ ] Mild [x ] Moderate [ ] Severe    Other Symptoms:  [] All other review of systems negative   [ x] Unable to obtain due to poor mentation     Karnofsky Performance Score/Palliative Performance Status Version 2:    10     %    PHYSICAL EXAM:    Vital Signs Last 24 Hrs  T(C): 36.8, Max: 36.8 (06-22 @ 07:44)  T(F): 98.3, Max: 98.3 (06-22 @ 07:44)  HR: 76 (76 - 76)  BP: 101/63 (101/63 - 101/63)  BP(mean): --  RR: 16 (16 - 16)  SpO2: 94% (94% - 94%)    General:  [ ] Alert          [ ] Oriented x2     [ ] Lethargic  [x ] Somnolent   [ ] Agitated     [ ] Cachexia   [ ] Unarousable  [ ] Verbal       [x ] Non-Verbal    HEENT:  [ ] Normal   [x ] Dry mouth   [ ] ET Tube    [ ] Trach  [ ] Oral lesions    Lungs:   [x ] Clear                         [ ] Tachypnea  [ ] Audible excessive secretions   [ ] Rhonchi                   [ ] Right             [ ] Left                                             [ ] Bilateral  [ ] Crackles                   [ ] Right             [ ] Left                                             [ ] Bilateral  [ ] Wheezing                [ ] Right             [ ] Left                                             [ ] Bilateral  [ ] Respiratory failure [ ] Acute           [ ] Chronic          [ ] Hypoxemic    [ ] Hypercarbic            [ ] Other    Cardiovascular:   [ ] Regular [x ] Irregular [ ] Tachycardia   [ ] Bradycardia  [ ] Murmur [ ] Other  [ ] Shock [ ] Septic [ ] Hypovolemic [ ] Neurogenic [ ] Cardiogenic   [ ] Vasopressors [ ] Inotropes    Abdomen:   [x ] Soft  [] Distended   [x ] +BS   [x] Non tender [ ] Tender epigastric  [ ] Other  [ ]PEG   [ ]OGT/ NGT      Last BM:   6/17    Genitourinary:  [ ] Normal [ ] Incontinent   [ ] Oliguria/Anuria   [x ] Damico  [ x] Other - enlarged tense dark pink right scrotum and inguinal hernia area        Musculoskeletal:    [ ] Normal                                         [x ] Weakness     [ ] Edema  [x ] Bedbound [ ]  Ambulatory [ ] with [ ] without assistance   [x ] Functional quadriplegia    Neurological:   [ ] No focal deficits       [ x] Cognitive impairment  [ ] Dysphagia [ ] Dysarthria [ ] Paresis [ ] Other   [ ] Brain compression  [ ] Cerebral edema            [x ] Encephalopathy    Skin:   [ x] Normal  [ ] Rash     [ ] Ulcer(s) type [ ] Diabetic [ ] Pressure [ ] Other   Stage        Present on Admission: [ ]  Yes [ ]  No      LABS:    None today    Protein Calorie Malnutrition: [x ] Mild [ ] Moderate [ ] Severe      Oral Intake:   [x ] Unable/mouth care only [ ] Minimal [ ] Moderate [ ] Full Capability    Diet:   [x ] NPO   [ ] Tube feeds   [ ] TPN   [ ] Other     REFERRALS:     [ ] Chaplaincy               [ ] Hospice Care                  [ ] Child Life                    [ ] Social Work    [ ] Case management [ ] Nutrition                         [ ] Holistic Therapy        [ ] Wound Care   [ ]Physical Therapy     [ ] Occupational Therapy  [ ] Speech Language Pathology  [ ] Other   [ ]See goals of care note in [place]

## 2017-06-23 PROCEDURE — 99233 SBSQ HOSP IP/OBS HIGH 50: CPT | Mod: GC

## 2017-06-23 RX ADMIN — SODIUM CHLORIDE 20 MILLILITER(S): 9 INJECTION, SOLUTION INTRAVENOUS at 06:13

## 2017-06-23 RX ADMIN — Medication 0.5 MILLIGRAM(S): at 21:30

## 2017-06-23 RX ADMIN — MORPHINE SULFATE 2 MG/HR: 50 CAPSULE, EXTENDED RELEASE ORAL at 08:01

## 2017-06-23 RX ADMIN — MORPHINE SULFATE 2 MG/HR: 50 CAPSULE, EXTENDED RELEASE ORAL at 19:14

## 2017-06-23 RX ADMIN — Medication 1 DROP(S): at 21:31

## 2017-06-23 RX ADMIN — MORPHINE SULFATE 2 MG/HR: 50 CAPSULE, EXTENDED RELEASE ORAL at 16:38

## 2017-06-23 RX ADMIN — Medication 0.5 MILLIGRAM(S): at 06:12

## 2017-06-23 RX ADMIN — SODIUM CHLORIDE 20 MILLILITER(S): 9 INJECTION, SOLUTION INTRAVENOUS at 19:15

## 2017-06-23 RX ADMIN — Medication 1 DROP(S): at 14:49

## 2017-06-23 RX ADMIN — Medication 1 DROP(S): at 06:12

## 2017-06-23 RX ADMIN — Medication 0.5 MILLIGRAM(S): at 14:49

## 2017-06-23 NOTE — PROGRESS NOTE ADULT - SUBJECTIVE AND OBJECTIVE BOX
Geriatric and Palliative Care Progress Note [x ]   Hospice Progress Note [ ]     HPI:  89 M with gastritis/gastric ulcer, gait instability s/p multiple falls, mild vascular dementia, hypothyroidism, urinary retention s/p TURP, HTN, HLD, CAD s/p CABG, depression, hearing and visual deficits, and prior history of right inguinal hernia with bowel incarceration s/p February 2017 manual reduction presents with intractable abdominal pain with N/V secondary to recurrence of right inguinal hernia with incarceration. Surgery attempted to manually reduce but was unsuccessful, and felt that surgery was too high risk to undertake. Two sons agreed to supportive care in Palliative Care Suite, with hopes that bowel obstruction would spontaneous reverse. Patient suffered HS delirium, well-controlled with ativan, and mild nausea, but overall described having pain well-managed with morphine. Patient had one episode of leaking stool from rectum on 6/17/17 without flatulence, but since that time has had increased inguinal and abdominal pain requiring additional dosing of morphine.    OVERNIGHT: Pt now much more comfortable with morphine gtt at current rate and ativan atc. No PRNs required. Will hold of on transfer and re-evaluate on Monday.      ADVANCE DIRECTIVES:    DNR                     [x ] YES [ ] NO                      [ ] Completed  MOLST                 [  ] YES [ x] NO                      [ ] Completed  Health Care Proxy [x ] YES  [ ] NO                      [ ] Completed  Living Will             [x ] YES [ ] NO             [ ] Surrogate  [ x] HCP  [ ] Guardian:     Joby soliz                                                     Phone#:     Allergies    Percocet 5/325 (Unknown)    Intolerances    chocolate (Vomiting)      MEDICATIONS  (STANDING):  levothyroxine Injectable 25MICROGram(s) IV Push daily  piperacillin/tazobactam IVPB. 3.375Gram(s) IV Intermittent every 8 hours  pantoprazole  Injectable 40milliGRAM(s) IV Push every 12 hours  artificial  tears Solution 1Drop(s) Both EYES three times a day  dexamethasone  Injectable 4milliGRAM(s) IV Push daily  dextrose 5% + sodium chloride 0.9%. 1000milliLiter(s) IV Continuous <Continuous>  morphine  - Injectable 4milliGRAM(s) IV Push every 6 hours    MEDICATIONS  (PRN):  ondansetron Injectable 4milliGRAM(s) IV Push four times a day PRN Nausea and/or Vomiting  haloperidol    Injectable 0.5milliGRAM(s) IV Push every 4 hours PRN Nausea  haloperidol    Injectable 0.5milliGRAM(s) IV Push two times a day PRN agitation  morphine  - Injectable 4milliGRAM(s) IV Push every 1 hour PRN pain  LORazepam   Injectable 0.5milliGRAM(s) IV Push every 1 hour PRN Agitation  ALBUTerol/ipratropium for Nebulization 3milliLiter(s) Nebulizer every 6 hours PRN Shortness of Breath and/or Wheezing      PRESENT SYMPTOMS:  Source: [ ] Patient   [x ] Family   [x ] Team     Pain:                        [ x] No [] Yes             [ ] Mild [] Moderate [] Severe    Onset -                             Location -                                  Duration -                                         Character -                                                Alleviating/Aggravating -             Radiation -                      Timing -                    Dyspnea:                [ x] No [ ] Yes             [ ] Mild [ ] Moderate [ ] Severe    Anxiety:                  [x ] No [ ] Yes             [ ] Mild [] Moderate [ ] Severe    Fatigue:                  [ ] No [ x] Yes             [ ] Mild [ ] Moderate [x ] Severe    Nausea:                  [x ] No [ ] Yes             [ ] Mild [ ] Moderate [ ] Severe    Loss of appetite:   [ ] No [x ] Yes             [ ] Mild [ ] Moderate [x ] Severe    Constipation:        [ ] No [ x] Yes             [ ] Mild [ ] Moderate [ x] Severe    Other Symptoms:  [] All other review of systems negative   [ x] Unable to obtain due to poor mentation     Karnofsky Performance Score/Palliative Performance Status Version 2:    10     %    PHYSICAL EXAM:  Vital Signs Last 24 Hrs  T(C): 36.9, Max: 36.9 (06-23 @ 07:58)  T(F): 98.4, Max: 98.4 (06-23 @ 07:58)  HR: 70 (70 - 70)  BP: 107/58 (107/58 - 107/58)  BP(mean): --  RR: 10 (10 - 10)  SpO2: 86% (86% - 86%)    General:  [ ] Alert          [ ] Oriented x2     [ x] Lethargic  [ ] Somnolent   [ ] Agitated     [ ] Cachexia   [ ] Unarousable  [ ] Verbal       [x ] Non-Verbal    HEENT:  [ ] Normal   [x ] Dry mouth   [ ] ET Tube    [ ] Trach  [ ] Oral lesions    Lungs:   [x ] Clear                         [ ] Tachypnea  [ ] Audible excessive secretions   [ ] Rhonchi                   [ ] Right             [ ] Left                                             [ ] Bilateral  [ ] Crackles                   [ ] Right             [ ] Left                                             [ ] Bilateral  [ ] Wheezing                [ ] Right             [ ] Left                                             [ ] Bilateral  [ ] Respiratory failure [ ] Acute           [ ] Chronic          [ ] Hypoxemic    [ ] Hypercarbic            [ ] Other    Cardiovascular:   [ ] Regular [x ] Irregular [ ] Tachycardia   [ ] Bradycardia  [ ] Murmur [ ] Other  [ ] Shock [ ] Septic [ ] Hypovolemic [ ] Neurogenic [ ] Cardiogenic   [ ] Vasopressors [ ] Inotropes    Abdomen:   [x ] Soft  [] Distended   [x ] +BS   [x] Non tender [ ] Tender epigastric  [ ] Other  [ ]PEG   [ ]OGT/ NGT      Last BM:   6/17    Genitourinary:  [ ] Normal [ ] Incontinent   [ ] Oliguria/Anuria   [x ] Damico  [ x] Other - enlarged tense dark pink right scrotum and inguinal hernia area        Musculoskeletal:    [ ] Normal                                         [x ] Weakness     [ ] Edema  [x ] Bedbound [ ]  Ambulatory [ ] with [ ] without assistance   [x ] Functional quadriplegia    Neurological:   [ ] No focal deficits       [ x] Cognitive impairment  [ ] Dysphagia [ ] Dysarthria [ ] Paresis [ ] Other   [ ] Brain compression  [ ] Cerebral edema            [x ] Encephalopathy    Skin:   [ x] Normal  [ ] Rash     [ ] Ulcer(s) type [ ] Diabetic [ ] Pressure [ ] Other   Stage        Present on Admission: [ ]  Yes [ ]  No      LABS:    None today    Protein Calorie Malnutrition: [ ] Mild [ x] Moderate [ ] Severe      Oral Intake:   [x ] Unable/mouth care only [ ] Minimal [ ] Moderate [ ] Full Capability    Diet:   [x ] NPO   [ ] Tube feeds   [ ] TPN   [ ] Other     REFERRALS:     [ ] Chaplaincy               [ ] Hospice Care                  [ ] Child Life                    [ ] Social Work    [ ] Case management [ ] Nutrition                         [ ] Holistic Therapy        [ ] Wound Care   [ ]Physical Therapy     [ ] Occupational Therapy  [ ] Speech Language Pathology  [ ] Other   [ ]See goals of care note in [place]

## 2017-06-23 NOTE — PROGRESS NOTE ADULT - PROBLEM SELECTOR PROBLEM 1
Abdominal pain, acute, right lower quadrant
Nausea and vomiting, intractability of vomiting not specified, unspecified vomiting type
Abdominal pain, acute, right lower quadrant
Nausea and vomiting, intractability of vomiting not specified, unspecified vomiting type

## 2017-06-23 NOTE — PROGRESS NOTE ADULT - ATTENDING COMMENTS
Patient seen with fellow.  Agree with above. Optimize comfort.  Hold transfer as pt is declining.
Patient seen with fellow.  Agree with above. Continue current regimen.  IP hospice referral made.
Patient seen with fellow agree with above.  Continue medical management
Patient seen with fellow.  Agree with above. Managing symptoms with possibility of transfer to inpatient hospital.

## 2017-06-23 NOTE — PROGRESS NOTE ADULT - PROBLEM SELECTOR PLAN 5
Emotional support for sons at bedside.  Will hold off on IP hospice transfer at this time and re-evaluate on Monday.

## 2017-06-24 VITALS
OXYGEN SATURATION: 53 % | RESPIRATION RATE: 12 BRPM | SYSTOLIC BLOOD PRESSURE: 70 MMHG | DIASTOLIC BLOOD PRESSURE: 41 MMHG | HEART RATE: 69 BPM | TEMPERATURE: 100 F

## 2017-06-24 PROCEDURE — 80053 COMPREHEN METABOLIC PANEL: CPT

## 2017-06-24 PROCEDURE — 82803 BLOOD GASES ANY COMBINATION: CPT

## 2017-06-24 PROCEDURE — 84295 ASSAY OF SERUM SODIUM: CPT

## 2017-06-24 PROCEDURE — 86850 RBC ANTIBODY SCREEN: CPT

## 2017-06-24 PROCEDURE — 83605 ASSAY OF LACTIC ACID: CPT

## 2017-06-24 PROCEDURE — 82330 ASSAY OF CALCIUM: CPT

## 2017-06-24 PROCEDURE — 82435 ASSAY OF BLOOD CHLORIDE: CPT

## 2017-06-24 PROCEDURE — 85027 COMPLETE CBC AUTOMATED: CPT

## 2017-06-24 PROCEDURE — 86901 BLOOD TYPING SEROLOGIC RH(D): CPT

## 2017-06-24 PROCEDURE — 84132 ASSAY OF SERUM POTASSIUM: CPT

## 2017-06-24 PROCEDURE — 96375 TX/PRO/DX INJ NEW DRUG ADDON: CPT

## 2017-06-24 PROCEDURE — 94640 AIRWAY INHALATION TREATMENT: CPT

## 2017-06-24 PROCEDURE — 71045 X-RAY EXAM CHEST 1 VIEW: CPT

## 2017-06-24 PROCEDURE — 83690 ASSAY OF LIPASE: CPT

## 2017-06-24 PROCEDURE — 85730 THROMBOPLASTIN TIME PARTIAL: CPT

## 2017-06-24 PROCEDURE — 82947 ASSAY GLUCOSE BLOOD QUANT: CPT

## 2017-06-24 PROCEDURE — 85014 HEMATOCRIT: CPT

## 2017-06-24 PROCEDURE — 74177 CT ABD & PELVIS W/CONTRAST: CPT

## 2017-06-24 PROCEDURE — 96374 THER/PROPH/DIAG INJ IV PUSH: CPT

## 2017-06-24 PROCEDURE — 86900 BLOOD TYPING SEROLOGIC ABO: CPT

## 2017-06-24 PROCEDURE — 96376 TX/PRO/DX INJ SAME DRUG ADON: CPT

## 2017-06-24 PROCEDURE — 85610 PROTHROMBIN TIME: CPT

## 2017-06-24 PROCEDURE — 99285 EMERGENCY DEPT VISIT HI MDM: CPT | Mod: 25

## 2017-06-24 RX ADMIN — MORPHINE SULFATE 4 MILLIGRAM(S): 50 CAPSULE, EXTENDED RELEASE ORAL at 01:12

## 2017-06-24 RX ADMIN — SODIUM CHLORIDE 20 MILLILITER(S): 9 INJECTION, SOLUTION INTRAVENOUS at 07:26

## 2017-06-24 RX ADMIN — SODIUM CHLORIDE 20 MILLILITER(S): 9 INJECTION, SOLUTION INTRAVENOUS at 06:42

## 2017-06-24 RX ADMIN — MORPHINE SULFATE 4 MILLIGRAM(S): 50 CAPSULE, EXTENDED RELEASE ORAL at 02:58

## 2017-06-24 RX ADMIN — Medication 1 DROP(S): at 06:42

## 2017-06-24 RX ADMIN — Medication 0.5 MILLIGRAM(S): at 06:42

## 2017-06-24 RX ADMIN — MORPHINE SULFATE 2 MG/HR: 50 CAPSULE, EXTENDED RELEASE ORAL at 07:25

## 2017-06-24 NOTE — PROVIDER CONTACT NOTE (OTHER) - ASSESSMENT
No response to external stimuli.   No spontaneous respirations.   NO apical heart rate.   Negative pupillary response to light.

## 2017-06-24 NOTE — DISCHARGE NOTE FOR THE EXPIRED PATIENT - HOSPITAL COURSE
89 M with gastritis/gastric ulcer, gait instability s/p multiple falls, mild vascular dementia, hypothyroidism, urinary retention s/p TURP, HTN, HLD, CAD s/p CABG, depression, hearing and visual deficits, and prior history of right inguinal hernia with bowel incarceration s/p February 2017 manual reduction presents with intractable abdominal pain with N/V secondary to recurrence of right inguinal hernia with incarceration. Surgery attempted to manually reduce but was unsuccessful, and felt that surgery was too high risk to undertake. Two sons agreed to supportive care in Palliative Care Suite, with hopes that bowel obstruction would spontaneous reverse. Patient suffered HS delirium, well-controlled with ativan, and mild nausea, but overall described having pain well-managed with morphine. Patient had one episode of leaking stool from rectum on 6/17/17 without flatulence, but since that time has had increased inguinal and abdominal pain requiring additional dosing of morphine. Pt continued to deteriorate, sons asked for maximal comfort. Pt passed at 9am on 6/24/17 with his son by his side.

## 2017-08-15 ENCOUNTER — APPOINTMENT (OUTPATIENT)
Dept: PULMONOLOGY | Facility: CLINIC | Age: 82
End: 2017-08-15

## 2017-09-06 NOTE — PATIENT PROFILE ADULT. - NS PRO OT REFERRAL QUES 2 YN
Pt to ER from home for dizziness and chest pain x 4 days. She was seen and released from [facility] earlier today. They did labs and a UA. Pt had EKG upon arrival here.   no

## 2018-02-27 NOTE — H&P ADULT. - VENOUS THROMBOEMBOLISM
Please  Golytely and Dulcolax at the [facility] pharmacy in [place].   Any questions please contact our office at [phone].     Colonoscopy Golytely Prep    One Week Prior to the Procedure:    · Stop Iron Supplements  · Avoid eating popcorn, seeds and nuts    The Day Prior to the Procedure:  Tuesday, March 13,    · Breakfast: You may have 2 slices of toast and put butter or margarine on the toast - nothing else.   · No solid foods following the toast however, you may then have things such as white milk, plain vanilla ice cream, plain vanilla pudding, and plain vanilla yogurt UNTIL 6:00 p.m.   · You may also have coffee, tea, broth, Jell-O, clear fruit juices, such as white grape or apple (do not drink pulpy juices such as orange juice), 7-Up, popsicles, and Omar-Aid all day UNTIL MIDNIGHT.   · DO NOT EAT ANYTHING WITH FRUIT, VEGETABLES OR SEEDS. AVOID RED/PURPLE COLORS.  · Take 2 Dulcolax tablets at 12:00 noon.  · Drinks 1/2 gallon of GoLYTELY between 5:00 pm and 7:00 pm (you can mix with sugar-free Crystal Light or Gatorade,  if desired. No red or purple color).  · Nothing to eat or drink after midnight except for the remaining GoLYTELY in the morning as directed.    THE MORNING OF THE PROCEDURE:   Wednesday, March 14    · Drink the remaining half gallon of GoLYTELY five hours prior to the examination. 6:30 - 8:30 am.  NOTHING more to eat or drink.  · All prescribed medication should be taken as usual (these will not affect the prep).  · The patient should arrive at the hospital one hour prior to the scheduled procedure.  · Please arrange transportation back to your home following the procedure.   · Do not take oral diabetic medications the morning of your procedure.   · If unable to finish prep in the morning, or if there is formed stool present call Same Day Surgery for further instructions [phone]  ·   Reminder please check with your insurance company to see if this procedure will be  covered.  (screening colonoscopy - screening for colon cancer)    Hospital: [facility] - Same Day Surgery 2nd Floor  Date:  Wednesday, March 14, 2018 at 11:30 am.  Please arrive at 10:30 am.            Patient Education     Colonoscopy  Colonoscopy is used to view the inside of your lower digestive tract (colon and rectum). It can help screen for colon cancer and can help find the source of abdominal pain, bleeding, and changes in bowel habits. The test is usually done in the hospital on an outpatient basis. During the exam, the doctor can remove a small tissue sample ( a biopsy) for testing. Small growths, such as polyps, may also be removed during colonoscopy.     A camera attached to a flexible tube with a viewing lens is used to take video pictures.   Getting ready   · Be sure to tell your doctor about any medications you take. Also tell your doctor about any health conditions you may have.  · Discuss the risks of the test with your doctor. These include bleeding and bowel puncture.  · Your rectum and colon must be empty for the test. So be sure to follow the diet and bowel prep instructions exactly. If you don’t, the test may need to be rescheduled.  · Ask your doctor whether you need to have a friend or family member prepared to drive you home after the test.     Colonoscopy provides an inside view of the entire colon.   During the test   · You are given sedating (relaxing) medication through an IV line. You may be drowsy or completely asleep.  · The procedure takes 30 minutes or longer.  · The doctor performs a digital rectal exam to check for anal and rectal problems. The rectum is lubricated and the scope inserted.  · If you are awake, you may have a feeling similar to needing to have a bowel movement. You may also feel pressure as air is pumped into the colon. It’s OK to pass gas during the procedure.  After the test   · You may discuss the results with your doctor right away or  at a future visit.  · Try to pass all the gas right after the test to help prevent bloating and cramping.  · After the test, you can go back to your normal eating and other activities.  Risks and possible complications include:  · Bleeding · A puncture or tear in the colon · Risks of anesthesia       © 2614-9214 [employer]. [address] [place] [address], [place] [address]. All rights reserved. This information is not intended as a substitute for professional medical care. Always follow your healthcare professional's instructions.                      no

## 2018-06-05 NOTE — ED ADULT TRIAGE NOTE - ARRIVAL FROM
72 yr old male to ed via ems from PMD . C/o chest pain rad l arm this am resolved. Took 4 baby asa at 2pm this afternoon. Awake alert and orientedx3. Resp even and nonlab. Denies sob or chest pain at this time . Has h/o type2 diabetes , HTN, and HLD. Denies n/v Denies diaphoresis. Denies dizziness or lightheadedness. denies pedal edema. Afebrile. denies burn or diff urinating. Cap refill wnl Conjunctiva pink Skin turgor normal daughter at bedside EKG done On CM
Home

## 2020-03-17 NOTE — PATIENT PROFILE ADULT. - PAIN SCALE PREFERRED, PROFILE
Pt had surgery with Dr. Hooper on Friday and was told to have a 2 wk f/u appt. Where would you like me to put her? There are no open slots available.   
Spoke with pt, doing well post surgery  Discussed benign path  AE and mammo due in summer, August early would work  Can call for problems otherwise no routine post op needed.   
numerical 0-10

## 2020-05-23 NOTE — PATIENT PROFILE ADULT. - PRO SERVICES AT [facility]
Previous hx GIB and DU when on Protonix daily dosing. Has been on longterm BID dosing without recurrence. Will RF med   none

## 2020-10-12 NOTE — PATIENT PROFILE ADULT. - AS SC BRADEN FRICTION
INTERVAL HPI:  43M with ETOH Abuse and withdrawal, Alcohol Pancreatitis who presents to the ED with severe abdominal pain.  Found to have Necrotizing Pancreatitis, Septic  Shock, Pneumonia/ARDS with Hypoxic Respiratory failure. Admitted to ICU, had prolonged hospital course with Vent support. Eventually required Tracheostomy and Peg placement.  Also with acute metabolic Encephalopathy.  09/20/20:  Transferred to .    Not able to provide details due to encephalopathy.  09/22/20: 1500 ml paracentesis.    OVERNIGHT EVENTS:  More calm and responsive today.    Vital Signs Last 24 Hrs  T(C): 36.9 (12 Oct 2020 16:24), Max: 36.9 (12 Oct 2020 10:30)  T(F): 98.5 (12 Oct 2020 16:24), Max: 98.5 (12 Oct 2020 10:30)  HR: 102 (12 Oct 2020 16:24) (91 - 122)  BP: 104/74 (12 Oct 2020 16:24) (104/74 - 121/82)  BP(mean): 88 (12 Oct 2020 10:30) (88 - 88)  RR: 20 (12 Oct 2020 16:24) (20 - 22)  SpO2: 98% (12 Oct 2020 16:24) (96% - 99%)    PHYSICAL EXAM:  GEN:         Awake, responsive and comfortable.  HEENT:     Trach   RESP:        no distress  CVS:           Regular rate and rhythm.   ABD:         Soft, non-tender, non-distended;     MEDICATIONS  (STANDING):  albuterol/ipratropium for Nebulization 3 milliLiter(s) Nebulizer every 6 hours  BACItracin   Ointment 1 Application(s) Topical two times a day  cefepime   IVPB 2000 milliGRAM(s) IV Intermittent every 8 hours  chlorhexidine 4% Liquid 1 Application(s) Topical <User Schedule>  clonazePAM  Tablet 1.5 milliGRAM(s) Oral two times a day  collagenase Ointment 1 Application(s) Topical daily  dextrose 5%. 1000 milliLiter(s) (50 mL/Hr) IV Continuous <Continuous>  dextrose 50% Injectable 12.5 Gram(s) IV Push once  dextrose 50% Injectable 25 Gram(s) IV Push once  dextrose 50% Injectable 25 Gram(s) IV Push once  folic acid 1 milliGRAM(s) Oral daily  insulin lispro (HumaLOG) corrective regimen sliding scale   SubCutaneous every 6 hours  metoprolol tartrate 25 milliGRAM(s) Enteral Tube every 8 hours  multivitamin 1 Tablet(s) Oral daily  pantoprazole   Suspension 40 milliGRAM(s) Oral daily  QUEtiapine 150 milliGRAM(s) Oral daily  QUEtiapine 250 milliGRAM(s) Oral <User Schedule>  sodium chloride 3%  Inhalation 4 milliLiter(s) Inhalation every 6 hours  thiamine 100 milliGRAM(s) Oral daily    MEDICATIONS  (PRN):  acetaminophen   Tablet .. 650 milliGRAM(s) Oral every 6 hours PRN Temp greater or equal to 38C (100.4F), Mild Pain (1 - 3)  dextrose 40% Gel 15 Gram(s) Oral once PRN Blood Glucose LESS THAN 70 milliGRAM(s)/deciliter  glucagon  Injectable 1 milliGRAM(s) IntraMuscular once PRN Glucose LESS THAN 70 milligrams/deciliter  LORazepam   Injectable 2 milliGRAM(s) IV Push every 4 hours PRN breakthrough agitation  metoprolol tartrate Injectable 5 milliGRAM(s) IV Push every 6 hours PRN if sbp>160 or HR >110 hold if SBP<100 or HR<60    LABS:    10-11    136  |  105  |  18  ----------------------------<  106<H>  4.3   |  24  |  0.49<L>    Ca    9.2      11 Oct 2020 07:13    TPro  8.5<H>  /  Alb  x   /  TBili  x   /  DBili  x   /  AST  x   /  ALT  x   /  AlkPhos  x   10-12    ASSESSMENT AND PLAN:  ·	Hypoxic Respiratory failure.  ·	S/P tracheostomy.  ·	Bilateral pneumonia.  ·	Worsening right pleural effusion.  ·	S/P Septic Shock.  ·	Necrotizing Pancreatis.  ·	Acute metabolic Encephalopathy.  ·	Alcohol abuse.  ·	Klebsiella Bacteremia.  ·	Anemia    Will continue trach collar.  For chest CT and possible thoracentesis.   (2) potential problem

## 2023-02-07 NOTE — PROVIDER CONTACT NOTE (OTHER) - NAME OF MD/NP/PA/DO NOTIFIED:
MD Kvng Del [place]le High Dose Vitamin A Counseling: Side effects reviewed, pt to contact office should one occur.

## 2023-08-21 NOTE — PROGRESS NOTE ADULT - PROBLEM SELECTOR PROBLEM 2
Delirium due to multiple etiologies
Gastroesophageal reflux disease without esophagitis
Nausea and vomiting, intractability of vomiting not specified, unspecified vomiting type
Gastroesophageal reflux disease without esophagitis
Wound Care: Petrolatum

## 2024-04-28 NOTE — DISCHARGE NOTE ADULT - PRINCIPAL DIAGNOSIS
Medication Scribe Admission Medication History    Admission medication history is complete. The information provided in this note is only as accurate as the sources available at the time of the update.    Information Source(s): Patient via in-person    Pertinent Information: None    Changes made to PTA medication list:  Added: None  Deleted: None  Changed: None    Allergies reviewed with patient and updates made in EHR: yes    Medication History Completed By: Charlotte Pizarro 4/27/2024 8:56 PM    PTA Med List   Medication Sig Last Dose    acetaminophen (TYLENOL) 325 MG tablet Take 3 tablets (975 mg) by mouth every 8 hours Unknown at prn    amLODIPine (NORVASC) 2.5 MG tablet Take 1 tablet (2.5 mg) by mouth 2 times daily 4/27/2024 at am    aspirin 325 MG EC tablet Take 1 tablet (325 mg) by mouth daily 4/27/2024 at am    calcium carbonate (OS-POLO 600 MG [place]. [place]) 600 MG tablet Take 1 tablet (600 mg) by mouth 2 times daily (with meals) 4/27/2024 at am    Cholecalciferol (VITAMIN D-3) 25 MCG (1000 UT) CAPS Take 1,000 Units by mouth daily 4/27/2024 at am    Docusate Sodium (COLACE PO) Take 50 mg by mouth as needed for constipation Past Week at prn    furosemide (LASIX) 20 MG tablet Take 1 tablet (20 mg) by mouth daily as needed (for legs swelling) Unknown at prn    LENalidomide (REVLIMID) 10 MG CAPS capsule Take 1 capsule (10 mg) by mouth daily Auth number is [id number] 4/27/2024 at am    losartan (COZAAR) 50 MG tablet Take 1 tablet (50 mg) by mouth 2 times daily 4/27/2024 at am    potassium chloride ninfa ER (KLOR-CON M20) 20 MEQ CR tablet Take 1 tablet (20 mEq) by mouth daily 4/26/2024 at 1200    SF 5000 PLUS 1.1 % CREA  4/26/2024 at pm      Syncope and collapse

## 2024-07-19 NOTE — PATIENT PROFILE ADULT. - FUNCTIONAL SCREEN CURRENT LEVEL: AMBULATION, MLM
Consent was obtained from the patient. The risks, benefits and alternatives to therapy were discussed in detail. Specifically, the risks of infection, scarring, bleeding, prolonged wound healing, nerve injury, incomplete removal, allergy to anesthesia and recurrence were addressed. Prior to the procedure, the treatment site was clearly identified and confirmed by the patient. All components of Universal Protocol/PAUSE Rule completed. No (2) assistive person
